# Patient Record
Sex: FEMALE | Race: WHITE | Employment: OTHER | URBAN - METROPOLITAN AREA
[De-identification: names, ages, dates, MRNs, and addresses within clinical notes are randomized per-mention and may not be internally consistent; named-entity substitution may affect disease eponyms.]

---

## 2019-07-14 ENCOUNTER — APPOINTMENT (OUTPATIENT)
Dept: GENERAL RADIOLOGY | Age: 84
DRG: 247 | End: 2019-07-14
Attending: EMERGENCY MEDICINE
Payer: MEDICARE

## 2019-07-14 ENCOUNTER — HOSPITAL ENCOUNTER (INPATIENT)
Age: 84
LOS: 1 days | Discharge: HOME HEALTH | DRG: 247 | End: 2019-07-16
Attending: EMERGENCY MEDICINE | Admitting: INTERNAL MEDICINE
Payer: MEDICARE

## 2019-07-14 DIAGNOSIS — I21.4 NSTEMI (NON-ST ELEVATED MYOCARDIAL INFARCTION) (HCC): ICD-10-CM

## 2019-07-14 DIAGNOSIS — R06.02 SOB (SHORTNESS OF BREATH): ICD-10-CM

## 2019-07-14 DIAGNOSIS — R07.9 CHEST PAIN, UNSPECIFIED TYPE: Primary | ICD-10-CM

## 2019-07-14 LAB
BASOPHILS # BLD: 0 K/UL (ref 0–0.1)
BASOPHILS NFR BLD: 0 % (ref 0–1)
COMMENT, HOLDF: NORMAL
DIFFERENTIAL METHOD BLD: ABNORMAL
EOSINOPHIL # BLD: 0.3 K/UL (ref 0–0.4)
EOSINOPHIL NFR BLD: 3 % (ref 0–7)
ERYTHROCYTE [DISTWIDTH] IN BLOOD BY AUTOMATED COUNT: 15.9 % (ref 11.5–14.5)
HCT VFR BLD AUTO: 33.8 % (ref 35–47)
HGB BLD-MCNC: 10.6 G/DL (ref 11.5–16)
IMM GRANULOCYTES # BLD AUTO: 0.1 K/UL (ref 0–0.04)
IMM GRANULOCYTES NFR BLD AUTO: 1 % (ref 0–0.5)
LYMPHOCYTES # BLD: 1.5 K/UL (ref 0.8–3.5)
LYMPHOCYTES NFR BLD: 16 % (ref 12–49)
MCH RBC QN AUTO: 24.3 PG (ref 26–34)
MCHC RBC AUTO-ENTMCNC: 31.4 G/DL (ref 30–36.5)
MCV RBC AUTO: 77.3 FL (ref 80–99)
MONOCYTES # BLD: 0.9 K/UL (ref 0–1)
MONOCYTES NFR BLD: 9 % (ref 5–13)
NEUTS SEG # BLD: 6.9 K/UL (ref 1.8–8)
NEUTS SEG NFR BLD: 71 % (ref 32–75)
NRBC # BLD: 0 K/UL (ref 0–0.01)
NRBC BLD-RTO: 0 PER 100 WBC
PLATELET # BLD AUTO: 256 K/UL (ref 150–400)
PMV BLD AUTO: 11.5 FL (ref 8.9–12.9)
RBC # BLD AUTO: 4.37 M/UL (ref 3.8–5.2)
SAMPLES BEING HELD,HOLD: NORMAL
TROPONIN I BLD-MCNC: 0.17 NG/ML (ref 0–0.08)
WBC # BLD AUTO: 9.6 K/UL (ref 3.6–11)

## 2019-07-14 PROCEDURE — 36415 COLL VENOUS BLD VENIPUNCTURE: CPT

## 2019-07-14 PROCEDURE — 94762 N-INVAS EAR/PLS OXIMTRY CONT: CPT

## 2019-07-14 PROCEDURE — 85025 COMPLETE CBC W/AUTO DIFF WBC: CPT

## 2019-07-14 PROCEDURE — 84484 ASSAY OF TROPONIN QUANT: CPT

## 2019-07-14 PROCEDURE — 85379 FIBRIN DEGRADATION QUANT: CPT

## 2019-07-14 PROCEDURE — 99285 EMERGENCY DEPT VISIT HI MDM: CPT

## 2019-07-14 PROCEDURE — 83690 ASSAY OF LIPASE: CPT

## 2019-07-14 PROCEDURE — 82550 ASSAY OF CK (CPK): CPT

## 2019-07-14 PROCEDURE — 83880 ASSAY OF NATRIURETIC PEPTIDE: CPT

## 2019-07-14 PROCEDURE — 80053 COMPREHEN METABOLIC PANEL: CPT

## 2019-07-14 PROCEDURE — 74011250637 HC RX REV CODE- 250/637: Performed by: EMERGENCY MEDICINE

## 2019-07-14 PROCEDURE — 85730 THROMBOPLASTIN TIME PARTIAL: CPT

## 2019-07-14 PROCEDURE — 71045 X-RAY EXAM CHEST 1 VIEW: CPT

## 2019-07-14 PROCEDURE — 84443 ASSAY THYROID STIM HORMONE: CPT

## 2019-07-14 PROCEDURE — 93005 ELECTROCARDIOGRAM TRACING: CPT

## 2019-07-14 RX ORDER — CHROMIUM PICOLINATE 200 MCG
1 TABLET ORAL DAILY
COMMUNITY

## 2019-07-14 RX ORDER — ALBUTEROL SULFATE 90 UG/1
2 AEROSOL, METERED RESPIRATORY (INHALATION)
COMMUNITY

## 2019-07-14 RX ORDER — ALENDRONATE SODIUM 70 MG/1
70 TABLET ORAL
COMMUNITY

## 2019-07-14 RX ORDER — SODIUM CHLORIDE 0.9 % (FLUSH) 0.9 %
5-40 SYRINGE (ML) INJECTION EVERY 8 HOURS
Status: DISCONTINUED | OUTPATIENT
Start: 2019-07-14 | End: 2019-07-16 | Stop reason: HOSPADM

## 2019-07-14 RX ORDER — METOPROLOL TARTRATE 50 MG/1
50 TABLET ORAL DAILY
COMMUNITY
End: 2019-07-15

## 2019-07-14 RX ORDER — NITROGLYCERIN 0.4 MG/1
0.4 TABLET SUBLINGUAL
Status: DISPENSED | OUTPATIENT
Start: 2019-07-14 | End: 2019-07-14

## 2019-07-14 RX ORDER — SODIUM CHLORIDE 0.9 % (FLUSH) 0.9 %
5-40 SYRINGE (ML) INJECTION AS NEEDED
Status: DISCONTINUED | OUTPATIENT
Start: 2019-07-14 | End: 2019-07-16 | Stop reason: HOSPADM

## 2019-07-14 RX ORDER — GUAIFENESIN 100 MG/5ML
162 LIQUID (ML) ORAL
Status: COMPLETED | OUTPATIENT
Start: 2019-07-14 | End: 2019-07-14

## 2019-07-14 RX ADMIN — ASPIRIN 81 MG 162 MG: 81 TABLET ORAL at 23:27

## 2019-07-14 RX ADMIN — NITROGLYCERIN 0.4 MG: 0.4 TABLET, ORALLY DISINTEGRATING SUBLINGUAL at 23:27

## 2019-07-14 RX ADMIN — NITROGLYCERIN 0.5 INCH: 20 OINTMENT TOPICAL at 23:42

## 2019-07-14 NOTE — Clinical Note
Difficulty engaging left coronaries. Attempted using several catheters.   Aborting radial approach and switching to femoral.

## 2019-07-14 NOTE — Clinical Note
Lesion located in the Proximal RCA. Balloon inflated using single inflation technique. Pressure = 14 merary; Duration = 30 sec.

## 2019-07-14 NOTE — Clinical Note
TRANSFER - OUT REPORT:  
 
Verbal report given to: Paresh Schimtz   . Report consisted of patient's Situation, Background, Assessment and  
Recommendations(SBAR). Opportunity for questions and clarification was provided. Patient transported with a Registered Nurse. Patient [facility] to: [facility]. [facility] Chi Lee.  Bedside report to be given

## 2019-07-14 NOTE — Clinical Note
Lesion: Located in the Proximal RCA. Stent deployed. First inflation pressure = 18 merary; inflation time: 30 sec.

## 2019-07-14 NOTE — Clinical Note
Sheath #2: Sheath: inserted. Sheath inserted/placed in the right femoral  artery. Hemostasis achieved.  [employer] 6FR sheath

## 2019-07-14 NOTE — Clinical Note
Balloon inflated using multiple inflations inflation technique. Pressure = 14 merary; Duration = 23 sec. Inflation 2: Pressure: 10 merary; Duration: 22 sec.

## 2019-07-15 ENCOUNTER — APPOINTMENT (OUTPATIENT)
Dept: NON INVASIVE DIAGNOSTICS | Age: 84
DRG: 247 | End: 2019-07-15
Attending: INTERNAL MEDICINE
Payer: MEDICARE

## 2019-07-15 ENCOUNTER — APPOINTMENT (OUTPATIENT)
Dept: CT IMAGING | Age: 84
DRG: 247 | End: 2019-07-15
Attending: INTERNAL MEDICINE
Payer: MEDICARE

## 2019-07-15 PROBLEM — M32.9 LUPUS (HCC): Chronic | Status: ACTIVE | Noted: 2019-07-15

## 2019-07-15 PROBLEM — J45.909 ASTHMA: Chronic | Status: ACTIVE | Noted: 2019-07-15

## 2019-07-15 PROBLEM — R07.9 CHEST PAIN AT REST: Status: ACTIVE | Noted: 2019-07-15

## 2019-07-15 PROBLEM — I21.4 NSTEMI (NON-ST ELEVATED MYOCARDIAL INFARCTION) (HCC): Status: ACTIVE | Noted: 2019-07-15

## 2019-07-15 PROBLEM — E03.9 HYPOTHYROID: Chronic | Status: ACTIVE | Noted: 2019-07-15

## 2019-07-15 PROBLEM — R07.9 ACUTE CHEST PAIN: Status: ACTIVE | Noted: 2019-07-15

## 2019-07-15 LAB
ACT BLD: 169 SECS (ref 79–138)
ACT BLD: 202 SECS (ref 79–138)
ACT BLD: 307 SECS (ref 79–138)
ALBUMIN SERPL-MCNC: 2.9 G/DL (ref 3.5–5)
ALBUMIN/GLOB SERPL: 0.7 {RATIO} (ref 1.1–2.2)
ALP SERPL-CCNC: 83 U/L (ref 45–117)
ALT SERPL-CCNC: 21 U/L (ref 12–78)
ANION GAP SERPL CALC-SCNC: 6 MMOL/L (ref 5–15)
APTT PPP: 25.7 SEC (ref 22.1–32)
APTT PPP: 93.1 SEC (ref 22.1–32)
AST SERPL-CCNC: 19 U/L (ref 15–37)
ATRIAL RATE: 76 BPM
ATRIAL RATE: 86 BPM
BASOPHILS # BLD: 0 K/UL (ref 0–0.1)
BASOPHILS NFR BLD: 0 % (ref 0–1)
BILIRUB SERPL-MCNC: 0.4 MG/DL (ref 0.2–1)
BNP SERPL-MCNC: 2723 PG/ML
BUN SERPL-MCNC: 14 MG/DL (ref 6–20)
BUN/CREAT SERPL: 15 (ref 12–20)
CALCIUM SERPL-MCNC: 8.5 MG/DL (ref 8.5–10.1)
CALCULATED P AXIS, ECG09: 60 DEGREES
CALCULATED P AXIS, ECG09: 87 DEGREES
CALCULATED R AXIS, ECG10: -22 DEGREES
CALCULATED R AXIS, ECG10: -29 DEGREES
CALCULATED T AXIS, ECG11: 106 DEGREES
CALCULATED T AXIS, ECG11: 125 DEGREES
CHLORIDE SERPL-SCNC: 107 MMOL/L (ref 97–108)
CK MB CFR SERPL CALC: 4.7 % (ref 0–2.5)
CK MB SERPL-MCNC: 2.4 NG/ML (ref 5–25)
CK SERPL-CCNC: 51 U/L (ref 26–192)
CK SERPL-CCNC: 75 U/L (ref 26–192)
CO2 SERPL-SCNC: 26 MMOL/L (ref 21–32)
COMMENT, HOLDF: NORMAL
CREAT SERPL-MCNC: 0.91 MG/DL (ref 0.55–1.02)
D DIMER PPP FEU-MCNC: 1.02 MG/L FEU (ref 0–0.65)
DIAGNOSIS, 93000: NORMAL
DIAGNOSIS, 93000: NORMAL
DIFFERENTIAL METHOD BLD: ABNORMAL
EOSINOPHIL # BLD: 0.3 K/UL (ref 0–0.4)
EOSINOPHIL NFR BLD: 4 % (ref 0–7)
ERYTHROCYTE [DISTWIDTH] IN BLOOD BY AUTOMATED COUNT: 16.1 % (ref 11.5–14.5)
GLOBULIN SER CALC-MCNC: 3.9 G/DL (ref 2–4)
GLUCOSE SERPL-MCNC: 108 MG/DL (ref 65–100)
HCT VFR BLD AUTO: 32 % (ref 35–47)
HGB BLD-MCNC: 10.1 G/DL (ref 11.5–16)
IMM GRANULOCYTES # BLD AUTO: 0 K/UL (ref 0–0.04)
IMM GRANULOCYTES NFR BLD AUTO: 1 % (ref 0–0.5)
LIPASE SERPL-CCNC: 72 U/L (ref 73–393)
LYMPHOCYTES # BLD: 1.8 K/UL (ref 0.8–3.5)
LYMPHOCYTES NFR BLD: 24 % (ref 12–49)
MCH RBC QN AUTO: 24.3 PG (ref 26–34)
MCHC RBC AUTO-ENTMCNC: 31.6 G/DL (ref 30–36.5)
MCV RBC AUTO: 77.1 FL (ref 80–99)
MONOCYTES # BLD: 0.8 K/UL (ref 0–1)
MONOCYTES NFR BLD: 10 % (ref 5–13)
NEUTS SEG # BLD: 4.8 K/UL (ref 1.8–8)
NEUTS SEG NFR BLD: 61 % (ref 32–75)
NRBC # BLD: 0 K/UL (ref 0–0.01)
NRBC BLD-RTO: 0 PER 100 WBC
P-R INTERVAL, ECG05: 140 MS
P-R INTERVAL, ECG05: 144 MS
PLATELET # BLD AUTO: 249 K/UL (ref 150–400)
PMV BLD AUTO: 11.1 FL (ref 8.9–12.9)
POTASSIUM SERPL-SCNC: 3.6 MMOL/L (ref 3.5–5.1)
PROT SERPL-MCNC: 6.8 G/DL (ref 6.4–8.2)
Q-T INTERVAL, ECG07: 368 MS
Q-T INTERVAL, ECG07: 410 MS
QRS DURATION, ECG06: 106 MS
QRS DURATION, ECG06: 106 MS
QTC CALCULATION (BEZET), ECG08: 440 MS
QTC CALCULATION (BEZET), ECG08: 461 MS
RBC # BLD AUTO: 4.15 M/UL (ref 3.8–5.2)
SAMPLES BEING HELD,HOLD: NORMAL
SODIUM SERPL-SCNC: 139 MMOL/L (ref 136–145)
THERAPEUTIC RANGE,PTTT: ABNORMAL SECS (ref 58–77)
THERAPEUTIC RANGE,PTTT: NORMAL SECS (ref 58–77)
TROPONIN I SERPL-MCNC: 0.23 NG/ML
TROPONIN I SERPL-MCNC: 0.33 NG/ML
TSH SERPL DL<=0.05 MIU/L-ACNC: 0.39 UIU/ML (ref 0.36–3.74)
VENTRICULAR RATE, ECG03: 76 BPM
VENTRICULAR RATE, ECG03: 86 BPM
WBC # BLD AUTO: 7.8 K/UL (ref 3.6–11)

## 2019-07-15 PROCEDURE — 77030008543 HC TBNG MON PRSS MRTM -A: Performed by: INTERNAL MEDICINE

## 2019-07-15 PROCEDURE — 94640 AIRWAY INHALATION TREATMENT: CPT

## 2019-07-15 PROCEDURE — 84484 ASSAY OF TROPONIN QUANT: CPT

## 2019-07-15 PROCEDURE — 77030029065 HC DRSG HEMO QCLOT ZMED -B

## 2019-07-15 PROCEDURE — 85025 COMPLETE CBC W/AUTO DIFF WBC: CPT

## 2019-07-15 PROCEDURE — 74011250636 HC RX REV CODE- 250/636: Performed by: INTERNAL MEDICINE

## 2019-07-15 PROCEDURE — 77030013715 HC INFL SYS MRTM -B: Performed by: INTERNAL MEDICINE

## 2019-07-15 PROCEDURE — 93306 TTE W/DOPPLER COMPLETE: CPT

## 2019-07-15 PROCEDURE — 93005 ELECTROCARDIOGRAM TRACING: CPT

## 2019-07-15 PROCEDURE — 74011250637 HC RX REV CODE- 250/637: Performed by: INTERNAL MEDICINE

## 2019-07-15 PROCEDURE — 74011000250 HC RX REV CODE- 250: Performed by: INTERNAL MEDICINE

## 2019-07-15 PROCEDURE — 36415 COLL VENOUS BLD VENIPUNCTURE: CPT

## 2019-07-15 PROCEDURE — 85730 THROMBOPLASTIN TIME PARTIAL: CPT

## 2019-07-15 PROCEDURE — C1760 CLOSURE DEV, VASC: HCPCS | Performed by: INTERNAL MEDICINE

## 2019-07-15 PROCEDURE — 77030003560 HC NDL HUBR BARD -A

## 2019-07-15 PROCEDURE — 99153 MOD SED SAME PHYS/QHP EA: CPT | Performed by: INTERNAL MEDICINE

## 2019-07-15 PROCEDURE — C1769 GUIDE WIRE: HCPCS | Performed by: INTERNAL MEDICINE

## 2019-07-15 PROCEDURE — 75710 ARTERY X-RAYS ARM/LEG: CPT | Performed by: INTERNAL MEDICINE

## 2019-07-15 PROCEDURE — 027034Z DILATION OF CORONARY ARTERY, ONE ARTERY WITH DRUG-ELUTING INTRALUMINAL DEVICE, PERCUTANEOUS APPROACH: ICD-10-PCS | Performed by: INTERNAL MEDICINE

## 2019-07-15 PROCEDURE — 74011636320 HC RX REV CODE- 636/320: Performed by: RADIOLOGY

## 2019-07-15 PROCEDURE — 85347 COAGULATION TIME ACTIVATED: CPT

## 2019-07-15 PROCEDURE — 71275 CT ANGIOGRAPHY CHEST: CPT

## 2019-07-15 PROCEDURE — C1725 CATH, TRANSLUMIN NON-LASER: HCPCS | Performed by: INTERNAL MEDICINE

## 2019-07-15 PROCEDURE — C1887 CATHETER, GUIDING: HCPCS | Performed by: INTERNAL MEDICINE

## 2019-07-15 PROCEDURE — C9113 INJ PANTOPRAZOLE SODIUM, VIA: HCPCS | Performed by: INTERNAL MEDICINE

## 2019-07-15 PROCEDURE — 92928 PRQ TCAT PLMT NTRAC ST 1 LES: CPT | Performed by: INTERNAL MEDICINE

## 2019-07-15 PROCEDURE — 82550 ASSAY OF CK (CPK): CPT

## 2019-07-15 PROCEDURE — C1874 STENT, COATED/COV W/DEL SYS: HCPCS | Performed by: INTERNAL MEDICINE

## 2019-07-15 PROCEDURE — 65660000000 HC RM CCU STEPDOWN

## 2019-07-15 PROCEDURE — 74011250636 HC RX REV CODE- 250/636

## 2019-07-15 PROCEDURE — B2111ZZ FLUOROSCOPY OF MULTIPLE CORONARY ARTERIES USING LOW OSMOLAR CONTRAST: ICD-10-PCS | Performed by: INTERNAL MEDICINE

## 2019-07-15 PROCEDURE — 77030004532 HC CATH ANGI DX IMP BSC -A: Performed by: INTERNAL MEDICINE

## 2019-07-15 PROCEDURE — 4A023N7 MEASUREMENT OF CARDIAC SAMPLING AND PRESSURE, LEFT HEART, PERCUTANEOUS APPROACH: ICD-10-PCS | Performed by: INTERNAL MEDICINE

## 2019-07-15 PROCEDURE — C1894 INTRO/SHEATH, NON-LASER: HCPCS | Performed by: INTERNAL MEDICINE

## 2019-07-15 PROCEDURE — 93458 L HRT ARTERY/VENTRICLE ANGIO: CPT | Performed by: INTERNAL MEDICINE

## 2019-07-15 PROCEDURE — B2151ZZ FLUOROSCOPY OF LEFT HEART USING LOW OSMOLAR CONTRAST: ICD-10-PCS | Performed by: INTERNAL MEDICINE

## 2019-07-15 PROCEDURE — 77030019569 HC BND COMPR RAD TERU -B: Performed by: INTERNAL MEDICINE

## 2019-07-15 PROCEDURE — 99152 MOD SED SAME PHYS/QHP 5/>YRS: CPT | Performed by: INTERNAL MEDICINE

## 2019-07-15 PROCEDURE — 71270 CT THORAX DX C-/C+: CPT

## 2019-07-15 DEVICE — IMPLANTABLE DEVICE: Type: IMPLANTABLE DEVICE | Status: FUNCTIONAL

## 2019-07-15 RX ORDER — IPRATROPIUM BROMIDE AND ALBUTEROL SULFATE 2.5; .5 MG/3ML; MG/3ML
3 SOLUTION RESPIRATORY (INHALATION)
Status: DISCONTINUED | OUTPATIENT
Start: 2019-07-15 | End: 2019-07-16 | Stop reason: HOSPADM

## 2019-07-15 RX ORDER — DIPHENHYDRAMINE HYDROCHLORIDE 50 MG/ML
12.5 INJECTION, SOLUTION INTRAMUSCULAR; INTRAVENOUS
Status: DISCONTINUED | OUTPATIENT
Start: 2019-07-15 | End: 2019-07-16 | Stop reason: HOSPADM

## 2019-07-15 RX ORDER — HEPARIN SODIUM 5000 [USP'U]/ML
80 INJECTION, SOLUTION INTRAVENOUS; SUBCUTANEOUS ONCE
Status: COMPLETED | OUTPATIENT
Start: 2019-07-15 | End: 2019-07-15

## 2019-07-15 RX ORDER — MELOXICAM 7.5 MG/1
TABLET ORAL DAILY
COMMUNITY
End: 2019-07-15

## 2019-07-15 RX ORDER — BUDESONIDE AND FORMOTEROL FUMARATE DIHYDRATE 160; 4.5 UG/1; UG/1
2 AEROSOL RESPIRATORY (INHALATION) 2 TIMES DAILY
COMMUNITY
End: 2021-04-23 | Stop reason: ALTCHOICE

## 2019-07-15 RX ORDER — LEVOTHYROXINE SODIUM 125 UG/1
125 TABLET ORAL
Status: DISCONTINUED | OUTPATIENT
Start: 2019-07-16 | End: 2019-07-16 | Stop reason: HOSPADM

## 2019-07-15 RX ORDER — ACETAMINOPHEN 325 MG/1
650 TABLET ORAL
Status: DISCONTINUED | OUTPATIENT
Start: 2019-07-15 | End: 2019-07-16 | Stop reason: HOSPADM

## 2019-07-15 RX ORDER — LEVOTHYROXINE SODIUM 150 UG/1
150 TABLET ORAL
COMMUNITY
End: 2019-07-15

## 2019-07-15 RX ORDER — MONTELUKAST SODIUM 10 MG/1
10 TABLET ORAL DAILY
Status: DISCONTINUED | OUTPATIENT
Start: 2019-07-15 | End: 2019-07-16 | Stop reason: HOSPADM

## 2019-07-15 RX ORDER — SODIUM CHLORIDE 0.9 % (FLUSH) 0.9 %
5-40 SYRINGE (ML) INJECTION AS NEEDED
Status: DISCONTINUED | OUTPATIENT
Start: 2019-07-15 | End: 2019-07-16 | Stop reason: HOSPADM

## 2019-07-15 RX ORDER — LIDOCAINE HYDROCHLORIDE 10 MG/ML
INJECTION INFILTRATION; PERINEURAL AS NEEDED
Status: DISCONTINUED | OUTPATIENT
Start: 2019-07-15 | End: 2019-07-15 | Stop reason: HOSPADM

## 2019-07-15 RX ORDER — CLOPIDOGREL BISULFATE 75 MG/1
75 TABLET ORAL DAILY
Status: DISCONTINUED | OUTPATIENT
Start: 2019-07-16 | End: 2019-07-16 | Stop reason: HOSPADM

## 2019-07-15 RX ORDER — HEPARIN SODIUM 1000 [USP'U]/ML
INJECTION, SOLUTION INTRAVENOUS; SUBCUTANEOUS AS NEEDED
Status: DISCONTINUED | OUTPATIENT
Start: 2019-07-15 | End: 2019-07-15 | Stop reason: HOSPADM

## 2019-07-15 RX ORDER — LEVOTHYROXINE SODIUM 75 UG/1
150 TABLET ORAL
Status: DISCONTINUED | OUTPATIENT
Start: 2019-07-15 | End: 2019-07-15

## 2019-07-15 RX ORDER — HYDROXYCHLOROQUINE SULFATE 200 MG/1
200 TABLET, FILM COATED ORAL DAILY
Status: ON HOLD | COMMUNITY
End: 2021-09-20

## 2019-07-15 RX ORDER — LISINOPRIL 40 MG/1
40 TABLET ORAL DAILY
COMMUNITY
End: 2020-10-13

## 2019-07-15 RX ORDER — BUDESONIDE 0.5 MG/2ML
500 INHALANT ORAL
Status: DISCONTINUED | OUTPATIENT
Start: 2019-07-15 | End: 2019-07-16 | Stop reason: HOSPADM

## 2019-07-15 RX ORDER — GUAIFENESIN 100 MG/5ML
LIQUID (ML) ORAL AS NEEDED
Status: DISCONTINUED | OUTPATIENT
Start: 2019-07-15 | End: 2019-07-15 | Stop reason: HOSPADM

## 2019-07-15 RX ORDER — MELOXICAM 15 MG/1
15 TABLET ORAL DAILY
Status: ON HOLD | COMMUNITY
End: 2019-07-15

## 2019-07-15 RX ORDER — MONTELUKAST SODIUM 10 MG/1
10 TABLET ORAL DAILY
COMMUNITY

## 2019-07-15 RX ORDER — HYDROCODONE BITARTRATE AND ACETAMINOPHEN 5; 325 MG/1; MG/1
1 TABLET ORAL
Status: DISCONTINUED | OUTPATIENT
Start: 2019-07-15 | End: 2019-07-16 | Stop reason: HOSPADM

## 2019-07-15 RX ORDER — GUAIFENESIN 100 MG/5ML
81 LIQUID (ML) ORAL DAILY
Status: DISCONTINUED | OUTPATIENT
Start: 2019-07-15 | End: 2019-07-16 | Stop reason: HOSPADM

## 2019-07-15 RX ORDER — OMEPRAZOLE 20 MG/1
20 CAPSULE, DELAYED RELEASE ORAL DAILY
COMMUNITY

## 2019-07-15 RX ORDER — HEPARIN SODIUM 10000 [USP'U]/100ML
18-36 INJECTION, SOLUTION INTRAVENOUS
Status: DISCONTINUED | OUTPATIENT
Start: 2019-07-15 | End: 2019-07-15

## 2019-07-15 RX ORDER — PANTOPRAZOLE SODIUM 40 MG/1
40 TABLET, DELAYED RELEASE ORAL
Status: DISCONTINUED | OUTPATIENT
Start: 2019-07-15 | End: 2019-07-15

## 2019-07-15 RX ORDER — FOLIC ACID 1 MG/1
1 TABLET ORAL DAILY
COMMUNITY

## 2019-07-15 RX ORDER — MORPHINE SULFATE 4 MG/ML
1 INJECTION INTRAVENOUS
Status: DISCONTINUED | OUTPATIENT
Start: 2019-07-15 | End: 2019-07-16 | Stop reason: HOSPADM

## 2019-07-15 RX ORDER — VERAPAMIL HYDROCHLORIDE 2.5 MG/ML
INJECTION, SOLUTION INTRAVENOUS AS NEEDED
Status: DISCONTINUED | OUTPATIENT
Start: 2019-07-15 | End: 2019-07-15 | Stop reason: HOSPADM

## 2019-07-15 RX ORDER — METOPROLOL TARTRATE 50 MG/1
50 TABLET ORAL DAILY
Status: DISCONTINUED | OUTPATIENT
Start: 2019-07-15 | End: 2019-07-15

## 2019-07-15 RX ORDER — SODIUM CHLORIDE 0.9 % (FLUSH) 0.9 %
5-40 SYRINGE (ML) INJECTION EVERY 8 HOURS
Status: DISCONTINUED | OUTPATIENT
Start: 2019-07-15 | End: 2019-07-16 | Stop reason: HOSPADM

## 2019-07-15 RX ORDER — LEVOTHYROXINE SODIUM 125 UG/1
125 TABLET ORAL
COMMUNITY

## 2019-07-15 RX ORDER — DEXTROSE, SODIUM CHLORIDE, AND POTASSIUM CHLORIDE 5; .45; .15 G/100ML; G/100ML; G/100ML
50 INJECTION INTRAVENOUS CONTINUOUS
Status: DISCONTINUED | OUTPATIENT
Start: 2019-07-15 | End: 2019-07-16

## 2019-07-15 RX ORDER — GUAIFENESIN 100 MG/5ML
81 LIQUID (ML) ORAL
COMMUNITY

## 2019-07-15 RX ORDER — MIDAZOLAM HYDROCHLORIDE 1 MG/ML
INJECTION, SOLUTION INTRAMUSCULAR; INTRAVENOUS AS NEEDED
Status: DISCONTINUED | OUTPATIENT
Start: 2019-07-15 | End: 2019-07-15 | Stop reason: HOSPADM

## 2019-07-15 RX ORDER — ARFORMOTEROL TARTRATE 15 UG/2ML
15 SOLUTION RESPIRATORY (INHALATION)
Status: DISCONTINUED | OUTPATIENT
Start: 2019-07-15 | End: 2019-07-16 | Stop reason: HOSPADM

## 2019-07-15 RX ORDER — LISINOPRIL 20 MG/1
40 TABLET ORAL DAILY
Status: DISCONTINUED | OUTPATIENT
Start: 2019-07-15 | End: 2019-07-16 | Stop reason: HOSPADM

## 2019-07-15 RX ORDER — HYDROXYCHLOROQUINE SULFATE 200 MG/1
200 TABLET, FILM COATED ORAL DAILY
Status: DISCONTINUED | OUTPATIENT
Start: 2019-07-15 | End: 2019-07-16 | Stop reason: HOSPADM

## 2019-07-15 RX ORDER — DOCUSATE SODIUM 100 MG/1
100 CAPSULE, LIQUID FILLED ORAL 2 TIMES DAILY
Status: DISCONTINUED | OUTPATIENT
Start: 2019-07-15 | End: 2019-07-16 | Stop reason: HOSPADM

## 2019-07-15 RX ORDER — BISMUTH SUBSALICYLATE 262 MG
1 TABLET,CHEWABLE ORAL DAILY
COMMUNITY
End: 2021-04-23 | Stop reason: ALTCHOICE

## 2019-07-15 RX ORDER — SODIUM CHLORIDE 9 MG/ML
75 INJECTION, SOLUTION INTRAVENOUS CONTINUOUS
Status: DISCONTINUED | OUTPATIENT
Start: 2019-07-15 | End: 2019-07-15

## 2019-07-15 RX ORDER — ONDANSETRON 2 MG/ML
4 INJECTION INTRAMUSCULAR; INTRAVENOUS
Status: DISCONTINUED | OUTPATIENT
Start: 2019-07-15 | End: 2019-07-16 | Stop reason: HOSPADM

## 2019-07-15 RX ORDER — FENTANYL CITRATE 50 UG/ML
INJECTION, SOLUTION INTRAMUSCULAR; INTRAVENOUS AS NEEDED
Status: DISCONTINUED | OUTPATIENT
Start: 2019-07-15 | End: 2019-07-15 | Stop reason: HOSPADM

## 2019-07-15 RX ORDER — NALOXONE HYDROCHLORIDE 0.4 MG/ML
0.4 INJECTION, SOLUTION INTRAMUSCULAR; INTRAVENOUS; SUBCUTANEOUS AS NEEDED
Status: DISCONTINUED | OUTPATIENT
Start: 2019-07-15 | End: 2019-07-16 | Stop reason: HOSPADM

## 2019-07-15 RX ORDER — CLOPIDOGREL 300 MG/1
TABLET, FILM COATED ORAL AS NEEDED
Status: DISCONTINUED | OUTPATIENT
Start: 2019-07-15 | End: 2019-07-15 | Stop reason: HOSPADM

## 2019-07-15 RX ORDER — METOPROLOL SUCCINATE 50 MG/1
50 TABLET, EXTENDED RELEASE ORAL DAILY
Status: DISCONTINUED | OUTPATIENT
Start: 2019-07-16 | End: 2019-07-16 | Stop reason: HOSPADM

## 2019-07-15 RX ORDER — ATORVASTATIN CALCIUM 20 MG/1
20 TABLET, FILM COATED ORAL DAILY
COMMUNITY

## 2019-07-15 RX ORDER — METOPROLOL SUCCINATE 50 MG/1
75 TABLET, EXTENDED RELEASE ORAL DAILY
COMMUNITY
End: 2020-10-13

## 2019-07-15 RX ADMIN — ASPIRIN 81 MG 81 MG: 81 TABLET ORAL at 08:23

## 2019-07-15 RX ADMIN — SODIUM CHLORIDE 75 ML/HR: 9 INJECTION, SOLUTION INTRAVENOUS at 17:00

## 2019-07-15 RX ADMIN — IOPAMIDOL 82 ML: 755 INJECTION, SOLUTION INTRAVENOUS at 01:43

## 2019-07-15 RX ADMIN — HEPARIN SODIUM 5300 UNITS: 5000 INJECTION INTRAVENOUS; SUBCUTANEOUS at 02:55

## 2019-07-15 RX ADMIN — LISINOPRIL 40 MG: 20 TABLET ORAL at 08:23

## 2019-07-15 RX ADMIN — METOPROLOL TARTRATE 50 MG: 50 TABLET ORAL at 08:23

## 2019-07-15 RX ADMIN — Medication 10 ML: at 21:04

## 2019-07-15 RX ADMIN — Medication 10 ML: at 08:10

## 2019-07-15 RX ADMIN — MORPHINE SULFATE 1 MG: 4 INJECTION, SOLUTION INTRAMUSCULAR; INTRAVENOUS at 01:57

## 2019-07-15 RX ADMIN — ARFORMOTEROL TARTRATE 15 MCG: 15 SOLUTION RESPIRATORY (INHALATION) at 09:50

## 2019-07-15 RX ADMIN — ACETAMINOPHEN 650 MG: 325 TABLET ORAL at 21:04

## 2019-07-15 RX ADMIN — MONTELUKAST SODIUM 10 MG: 10 TABLET, FILM COATED ORAL at 08:23

## 2019-07-15 RX ADMIN — Medication 10 ML: at 22:00

## 2019-07-15 RX ADMIN — DEXTROSE MONOHYDRATE, SODIUM CHLORIDE, AND POTASSIUM CHLORIDE 50 ML/HR: 50; 4.5; 1.49 INJECTION, SOLUTION INTRAVENOUS at 03:20

## 2019-07-15 RX ADMIN — DOCUSATE SODIUM 100 MG: 100 CAPSULE, LIQUID FILLED ORAL at 08:23

## 2019-07-15 RX ADMIN — PANTOPRAZOLE SODIUM 40 MG: 40 INJECTION, POWDER, FOR SOLUTION INTRAVENOUS at 21:04

## 2019-07-15 RX ADMIN — DOCUSATE SODIUM 100 MG: 100 CAPSULE, LIQUID FILLED ORAL at 17:23

## 2019-07-15 RX ADMIN — BUDESONIDE 500 MCG: 0.5 INHALANT RESPIRATORY (INHALATION) at 19:36

## 2019-07-15 RX ADMIN — BUDESONIDE 500 MCG: 0.5 INHALANT RESPIRATORY (INHALATION) at 09:50

## 2019-07-15 RX ADMIN — HEPARIN SODIUM AND DEXTROSE 18 UNITS/KG/HR: 10000; 5 INJECTION INTRAVENOUS at 02:52

## 2019-07-15 RX ADMIN — Medication 10 ML: at 01:54

## 2019-07-15 RX ADMIN — LEVOTHYROXINE SODIUM 150 MCG: 75 TABLET ORAL at 08:23

## 2019-07-15 RX ADMIN — HYDROXYCHLOROQUINE SULFATE 200 MG: 200 TABLET, FILM COATED ORAL at 08:23

## 2019-07-15 RX ADMIN — ARFORMOTEROL TARTRATE 15 MCG: 15 SOLUTION RESPIRATORY (INHALATION) at 19:36

## 2019-07-15 RX ADMIN — PANTOPRAZOLE SODIUM 40 MG: 40 INJECTION, POWDER, FOR SOLUTION INTRAVENOUS at 08:24

## 2019-07-15 NOTE — PROGRESS NOTES
13:50,report received,assumed care of pt. EKG completed. 15:30, Air release completed. TR Band removed from rt wrist. No bleeding or  Hematoma. Dressing applied. Wrist immobilizer in place. Radial and ulnar pulse remain palpable on affected extremity. Pt tolerated well. Instructions given to pt regarding movement and activity restrictions. Pt voiced understanding. 14:55. TRANSFER - OUT REPORT:    Verbal report given to SHELDON Merino(name) on [place]  being transferred to [facility](unit) for routine progression of care       Report consisted of patients Situation, Background, Assessment and   Recommendations(SBAR). Information from the following report(s) Procedure Summary was reviewed with the receiving nurse. Lines:   Venous Access Device 07/15/19 Upper chest (subclavicular area, right (Active)   Site Assessment Clean, dry, & intact 7/15/2019  1:35 PM   Dressing Status Clean, dry, & intact 7/15/2019  1:35 PM       Peripheral IV 07/14/19 Left Antecubital (Active)   Site Assessment Clean, dry, & intact 7/15/2019  1:35 PM   Phlebitis Assessment 0 7/15/2019  1:35 PM   Infiltration Assessment 0 7/15/2019  1:35 PM   Dressing Status Clean, dry, & intact 7/15/2019  1:35 PM   Dressing Type Transparent 7/15/2019  1:35 PM   Hub Color/Line Status Pink 7/15/2019  1:35 PM        Opportunity for questions and clarification was provided.       Patient transported with:   Monitor  Registered Nurse

## 2019-07-15 NOTE — PROGRESS NOTES
GI note    Consult received chart reviewed. If it is felt that her paraesophageal hernia is the cause of her chest pain then a surgical consultation would be in order. Full consult to follow. Clark King MD       NSTEMI is the diagnosis, now status post cath with stent placement. Will provide consult tomorrow to see if chest pain resolves with coronary intervention.   Clark King MD

## 2019-07-15 NOTE — PROCEDURES
BRIEF PROCEDURE NOTE    Date of Procedure: 7/15/2019   Preoperative Diagnosis: NSTEMI  Postoperative Diagnosis: PCI to RCA with VINOD  Procedure: Left heart cath, LV angiography, coronary angiography  Interventional Cardiologist: Alphonso Ace MD  Anesthesia: local + IV sedation  Estimated Blood Loss: Minimal  Findings:     R Radial access  RCA - JR4  LCA - difficulty to access with TIG4;EBU 3.5    R CFA - micropuncture/ultrasound/fluorscopy guided access    L Main: Ca++; Ostial LM 10% ( good reflux/no dampening)    LAD: Med; MLI; D1 - MLI    LCflex: Med; MLI    RCA: Dominant  Prox 95%; Mid 50%; PDA and PLB - small; PLB branch diffuse severe dz      LVEDP:  15    LVEF: Not assessed; Nml by echo    No significant gradient across aortic valve. PCI: JR4 guide ( with SH)  BMW wire  Pre dil with 2 by 12  VINOD 2.5 by 26  Post dil with 2.75 by 12 ( at high merary - approx 3mm)  GERALD 3 before and after        Specimens Removed : None    Closure Device: TR Band - R radial    R CFA - mynx        See full cath note.     Complications: none    Alphonso Ace MD

## 2019-07-15 NOTE — H&P
[facility]  [address] [facility] [place], [address] [place] [address]  [phone]    Admission History and Physical      NAME:  Kenya Cali   :   1935   MRN:  [id number]     PCP:  Bernardo Frost MD     Date/Time:  7/15/2019         Subjective:     CHIEF COMPLAINT: chest pain, SOB     HISTORY OF PRESENT ILLNESS:     The patient is a 82 yo hx of HTN, asthma, SLE, hypothyroid, non-Hodgkin's lymphoma in remission, presented w/ chest pain, SOB, possible NSTEMI. The patient c/o intermittent midsternal chest pain for 1 month, associated with severe dyspnea on exertion. She was admitted at [facility] last month and received a negative stress test.  However, her chest pain and dyspnea remained the same. She denied cough, fevers, chills, nausea, vomiting, diarrhea, diaphoresis. In the ED, EKG showed a LBBB (no old EKG to compare). Trop was 0.23, pro BNP 2,723. Allergies   Allergen Reactions    Latex Rash       Prior to Admission medications    Medication Sig Start Date End Date Taking? Authorizing Provider   hydroxychloroquine (PLAQUENIL) 200 mg tablet Take 200 mg by mouth daily. Yes Provider, Historical   levothyroxine (SYNTHROID) 150 mcg tablet Take 150 mcg by mouth Daily (before breakfast). Yes Provider, Historical   lisinopril (PRINIVIL, ZESTRIL) 40 mg tablet Take 40 mg by mouth daily. Yes Provider, Historical   montelukast (SINGULAIR) 10 mg tablet Take 10 mg by mouth daily. Yes Provider, Historical   omeprazole (PRILOSEC) 20 mg capsule Take 20 mg by mouth daily. Yes Provider, Historical   budesonide-formoterol (SYMBICORT) 160-4.5 mcg/actuation HFAA Take 2 Puffs by inhalation two (2) times a day. Yes Provider, Historical   albuterol (PROVENTIL HFA, VENTOLIN HFA, PROAIR HFA) 90 mcg/actuation inhaler Take 2 Puffs by inhalation every six (6) hours as needed for Wheezing.    Yes Other, MD Poppy   alendronate (FOSAMAX) 70 mg tablet Take 70 mg by mouth every seven (7) days. Yes Other, MD Poppy   metoprolol tartrate (LOPRESSOR) 50 mg tablet Take 50 mg by mouth daily. Yes Other, MD Poppy   Calcium-Cholecalciferol, D3, (CALCIUM 600 WITH VITAMIN D3) 600 mg(1,500mg) -400 unit cap Take  by mouth. Yes Other, MD Poppy       Past Medical History:   Diagnosis Date    Asthma     Hypertension     Hypothyroid     [facility]     [facility]         No past surgical history on file. Social History     Tobacco Use    Smoking status: Not on file   Substance Use Topics    Alcohol use: Not on file        Family History   Problem Relation Age of Onset    Heart Disease Mother         Review of Systems:  (bold if positive, if negative)    Gen:  Eyes:  ENT:  CVS:   chest pain,Pulm:   dyspnea, GI:    :    MS:  Skin:  Psych:  Endo:    Hem:  Renal:    Neuro:          Objective:      VITALS:    Vital signs reviewed; most recent are:    Visit Vitals  BP (!) 142/101   Pulse 81   Temp 98.4 °F (36.9 °C)   Resp 25   Ht 5' 2\" (1.575 m)   Wt 66.2 kg (146 lb)   SpO2 99%   BMI 26.70 kg/m²     SpO2 Readings from Last 6 Encounters:   07/14/19 99%        No intake or output data in the 24 hours ending 07/15/19 0127     Exam:     Physical Exam:    Gen:  Elderly, frail, mild distress  HEENT:  Pink conjunctivae, PERRL, hearing intact to voice, moist mucous membranes  Neck:  Supple, without masses, thyroid non-tender  Resp:  No accessory muscle use, bilateral rales at bases  Card:  2/6 murmurs, normal S1, S2 without thrills, bruits or peripheral edema  Abd:  Soft, non-tender, non-distended, normoactive bowel sounds are present  Lymph:  No cervical adenopathy  Musc:  No cyanosis or clubbing  Skin:  No rashes  Neuro:  Cranial nerves 3-12 are grossly intact, follows commands appropriately  Psych:  Alert with good insight.   Oriented to person, place, and time    Labs:    Recent Labs     07/14/19  2308   WBC 9.6   HGB 10.6*   HCT 33.8*        Recent Labs 07/14/19  2308      K 3.6      CO2 26   *   BUN 14   CREA 0.91   CA 8.5   ALB 2.9*   TBILI 0.4   SGOT 19   ALT 21     No results found for: GLUCPOC  No results for input(s): PH, PCO2, PO2, HCO3, FIO2 in the last 72 hours. No results for input(s): INR in the last 72 hours. No lab exists for component: INREXT    Radiology and EKG reviewed:   CXR unremarkable    **Old Records reviewed in [facility]**       Assessment/Plan:       Principal Problem:    82 yo hx of HTN, asthma, SLE, hypothyroid, non-Hodgkin's lymphoma in remission, presented w/ chest pain, SOB, possible NSTEMI    1) Acute chest pain/dyspnea: concerning for ACS given elevated trop and LBBB, however, given hx of prior lymphoma and pleuritic chest pain, PE also in DDx. Will monitor on Tele. Check d-dimer, chest CTA. Empirically start on heparin gtt, O2    2) NSTEMI (non-ST elevated myocardial infarction): initial trop 0.23. Patient reported a \"normal stress test\" last month at OSH. Will check serial enzymes, lipids, TSH. Start O2, ASA, nitro prn, IV morphine prn, heparin gtt. Consult Cards    3) Asthma: stable. Cont nebs prn, LABA/ICS    4) SLE: cont hydroxychloroquine    5) Hypothyroid: check TSH, cont synthroid    6) HTN: cont metoprolol, lisinopril.   Consult pharm for med rec    Risk of deterioration: high      Total time spent with patient: [address] [place] [address] discussed with: Patient, family, ED, nursing    Discussed:  Care Plan    Prophylaxis:  heparin gtt    Probable Disposition:  Home w/Family           ___________________________________________________    Attending Physician: Lloyd Edgar MD

## 2019-07-15 NOTE — PROGRESS NOTES
Bedside and Verbal shift change report given to Kristen Ulloa RN (oncoming nurse) by Jaja Maldonado RN  (Cath Lab Recovery Nurse). Report included the following information SBAR, Kardex, MAR and Cardiac Rhythm NSR.     1600: Patient brought up from Cath Lab recovery. No complaints of pain or discomfort. Right radial cath site D/I and without hematoma. Reminded patient not to use her right hand. Brace to right hand. Right groin cath site D/I. No hematoma. Encouraged patient to call for assistance. Call light in reach. Monitor with frequent PVCs. 1630: Family at bedside. 1730: Patient resting in bed. No complaints. 1930: Bedside and Verbal shift change report given to Kurt Mondragon RN (oncoming nurse) by Kristen Ulloa RN (offgoing nurse). Report included the following information SBAR, Kardex, MAR and Cardiac Rhythm NSR with PVCs.

## 2019-07-15 NOTE — PROGRESS NOTES
[facility]: MED RECONCILIATION    Comments/Recommendations:   Patient was alert, oriented and was a fair historian. Patient's granddaughter was at the bed side. Patient had a medication list, however strengths of Meloxicam, Levothyroxine, and Metoprolol did not match with Rx Query Fill history. Confirmed strengths with fill history from [facility]. Patient reports that Atorvastatin is a recently new prescription, added approx. . Patient denies additional OTC medication use. Medications added:     Levothyroxine 125 mc every day before breakfast  Meloxicam 15 m every day   Metoprolol succinate 50 m every day      Medications removed:    Levothyroxine 150 mcg  Meloxicam 7.5 mg  Metoprolol tartrate 50 mg     Information obtained from: Rx Query, Patient med list, Patient's granddaughter,     Allergies: Latex Allergy information confirmed with patient. Prior to Admission Medications:     Medication Documentation Review Audit       Reviewed by Venkata Loyola (Pharmacy Student) on 07/15/19 at 9109      Medication Sig Documenting Provider Last Dose Status Taking? albuterol (PROVENTIL HFA, VENTOLIN HFA, PROAIR HFA) 90 mcg/actuation inhaler Take 2 Puffs by inhalation every six (6) hours as needed for Wheezing. Poppy Shaffer MD 2019 Active Yes   alendronate (FOSAMAX) 70 mg tablet Take 70 mg by mouth every seven (7) days. Poppy Shaffer MD 2019 Active Yes   aspirin 81 mg chewable tablet Take 81 mg by mouth daily. Poppy Shaffer MD 2019 Active Yes   atorvastatin (LIPITOR) 20 mg tablet Take 20 mg by mouth daily. Poppy Shaffer MD 2019 Active Yes   budesonide-formoterol (SYMBICORT) 160-4.5 mcg/actuation HFAA Take 2 Puffs by inhalation two (2) times a day. Provider, Lilly 2019 Active Yes   Calcium-Cholecalciferol, D3, (CALCIUM 600 WITH VITAMIN D3) 600 mg(1,500mg) -400 unit cap Take 1 Tab by mouth two (2) times a day.  Poppy Shaffer MD 3/53/9049 Active Yes   folic acid (FOLVITE) 1 mg tablet Take 1 mg by mouth daily. Poppy Shaffer MD 7/14/2019 Active Yes   hydroxychloroquine (PLAQUENIL) 200 mg tablet Take 200 mg by mouth daily. Provider, Historical 7/14/2019 Active Yes   levothyroxine (SYNTHROID) 125 mcg tablet Take 125 mcg by mouth Daily (before breakfast). Provider, Historical 7/14/2019 Active Yes   lisinopril (PRINIVIL, ZESTRIL) 40 mg tablet Take 40 mg by mouth daily. Provider, Historical 7/14/2019 Active Yes   meloxicam (MOBIC) 15 mg tablet Take 15 mg by mouth daily. Provider, Historical 7/14/2019 Active Yes   metoprolol succinate (TOPROL-XL) 50 mg XL tablet Take 50 mg by mouth daily. Provider, Historical 7/14/2019 Active Yes   montelukast (SINGULAIR) 10 mg tablet Take 10 mg by mouth daily. Provider, Historical 7/14/2019 Unknown time Active Yes   multivitamin (ONE A DAY) tablet Take 1 Tab by mouth daily. Poppy Shaffer MD 7/14/2019 Unknown time Active Yes   omeprazole (PRILOSEC) 20 mg capsule Take 20 mg by mouth daily.  Provider, Historical 7/14/2019 Unknown time Active Yes                    Thank you,     Laura Stern   PharmD Candidate [id number]

## 2019-07-15 NOTE — PROGRESS NOTES
Problem: Falls - Risk of  Goal: *Absence of Falls  Description  Document Belia Santana Fall Risk and appropriate interventions in the flowsheet.   Outcome: Progressing Towards Goal     Problem: Patient Education: Go to Patient Education Activity  Goal: Patient/Family Education  Outcome: Progressing Towards Goal

## 2019-07-15 NOTE — ED PROVIDER NOTES
81 y.o. female with past medical history significant for Lupus, RA, asthma, hypothyroidism, HTN, and h/o of lymphoma (treated with radiation and chemotherapy; managed at [facility]) presents in wheelchair and accompanied by family with chief complaint of shortness of breath and a \"dull\" midsternal chest pain, currently a 3/10 in severity, onset this evening without any noted improvement. Family explains that the pt was recently admitted for a lung infection that was managed by her PCP, adding that she was treated with doxycycline at the time. Family further explains that the pt then exhibited a decreased appetite and generalized malaise a few days later, prompting them to take her to the hospital where she was again admitted. At the time, family reports that the pt had an elevated troponin and an EKG that was significant for ST elevation, noting that she was admitted soon thereafter. Per family, the pt was discharged on 06/19/19 after having a normal echocardiogram and stress test. Family reports that the pt did not follow up with cardiology, and did not have a cardiac catheterization. They also report that she was started on a cholesterol medication at discharge. Upon arrival, the pt explains that she started with a dull chest pain on 07/12/19 that resolved after taking a shower. Today, however, the pt indicates that the dull pain returned with associated shortness of breath. Family continues to explain that they found the patient \"completely collapsed over and unable to get out any words. \" They further report that \"she was clutching her chest and was unable to breathe,\" all of which prompted the EMS call. Pt's relative includes that the pt's vitals were normal at home, with reported HR of 99, BP of 160/72, and O2 saturation of 98%. The pt also includes that she took an 81 mg ASA and a nebulizer treatment, adding that the nebulizer treatment exacerbated her pain.  Pt denies any nausea, vomiting, back pain, diaphoresis, or any other symptoms at this time. There are no other acute medical concerns at this time. Social hx: No reported Tobacco use. No reported EtOH use. No reported illicit drug abuse. PCP: No primary care provider on file. Note written by Lashawn Patel, as dictated by [facility] Koko, DO 11:06 PM      The history is provided by the patient and a relative. No  was used. Past Medical History:   Diagnosis Date    Asthma     Hypertension     Hypothyroid     Lupus ([facility] [address] [facility]     Non Hodgkin's lymphoma ([facility] [address] [facility]        No past surgical history on file. No family history on file.     Social History     Socioeconomic History    Marital status:      Spouse name: Not on file    Number of children: Not on file    Years of education: Not on file    Highest education level: Not on file   Occupational History    Not on file   Social Needs    Financial resource strain: Not on file    Food insecurity:     Worry: Not on file     Inability: Not on file    Transportation needs:     Medical: Not on file     Non-medical: Not on file   Tobacco Use    Smoking status: Not on file   Substance and Sexual Activity    Alcohol use: Not on file    Drug use: Not on file    Sexual activity: Not on file   Lifestyle    Physical activity:     Days per week: Not on file     Minutes per session: Not on file    Stress: Not on file   Relationships    Social connections:     Talks on phone: Not on file     Gets together: Not on file     Attends Zoroastrian service: Not on file     Active member of club or organization: Not on file     Attends meetings of clubs or organizations: Not on file     Relationship status: Not on file    Intimate partner violence:     Fear of current or ex partner: Not on file     Emotionally abused: Not on file     Physically abused: Not on file     Forced sexual activity: Not on file   Other Topics Concern    Not on file   Social History Narrative    Not on file ALLERGIES: Latex    Review of Systems   Constitutional: Negative for appetite change, diaphoresis, fever and unexpected weight change. HENT: Negative for ear pain, hearing loss, rhinorrhea and trouble swallowing. Eyes: Negative for pain and visual disturbance. Respiratory: Positive for shortness of breath. Negative for cough and chest tightness. Cardiovascular: Positive for chest pain. Negative for palpitations. Gastrointestinal: Negative for abdominal distention, abdominal pain, blood in stool, nausea and vomiting. Genitourinary: Negative for dysuria, hematuria and urgency. Musculoskeletal: Negative for back pain and myalgias. Skin: Negative for rash. Neurological: Negative for dizziness, syncope, weakness and numbness. Psychiatric/Behavioral: Negative for confusion and suicidal ideas. All other systems reviewed and are negative. Vitals:    07/14/19 2302 07/14/19 2342   BP: 153/66 (!) 142/101   Pulse: 88 81   Resp: 25    Temp: 98.4 °F (36.9 °C)    SpO2: 99%    Weight: 66.2 kg (146 lb)    Height: 5' 2\" (1.575 m)             Physical Exam   Constitutional: She is oriented to person, place, and time. She appears well-developed and well-nourished. No distress. HENT:   Head: Normocephalic and atraumatic. Right Ear: External ear normal.   Left Ear: External ear normal.   Nose: Nose normal.   Mouth/Throat: Oropharynx is clear and moist. No oropharyngeal exudate. Eyes: Pupils are equal, round, and reactive to light. Conjunctivae and EOM are normal. Right eye exhibits no discharge. Left eye exhibits no discharge. No scleral icterus. Neck: Normal range of motion. Neck supple. No JVD present. No tracheal deviation present. Cardiovascular: Normal rate, regular rhythm, normal heart sounds and intact distal pulses. Exam reveals no gallop and no friction rub. No murmur heard. Pulmonary/Chest: Effort normal. No stridor. No respiratory distress.  She has decreased breath sounds in the right lower field and the left lower field. She has no wheezes. She has no rhonchi. She has no rales. She exhibits no tenderness. Port present over right anterior chest.   Abdominal: Soft. Bowel sounds are normal. She exhibits no distension. There is no tenderness. There is no rebound and no guarding. Musculoskeletal: Normal range of motion. She exhibits no edema or tenderness. Neurological: She is alert and oriented to person, place, and time. She has normal strength and normal reflexes. She displays normal reflexes. No cranial nerve deficit or sensory deficit. She exhibits normal muscle tone. Coordination normal. GCS eye subscore is 4. GCS verbal subscore is 5. GCS motor subscore is 6. Skin: Skin is warm and dry. No rash noted. She is not diaphoretic. No erythema. No pallor. Psychiatric: She has a normal mood and affect. Her behavior is normal. Judgment and thought content normal.   Nursing note and vitals reviewed.     Note written by Ifrah Wolfe, as dictated by Leona Glez, DO 11:06 PM     MDM  Number of Diagnoses or Management Options  Chest pain, unspecified type:   NSTEMI (non-ST elevated myocardial infarction) [facility]):   SOB (shortness of breath):      Amount and/or Complexity of Data Reviewed  Clinical lab tests: ordered and reviewed  Tests in the radiology section of CPT®: ordered and reviewed  Tests in the medicine section of CPT®: ordered and reviewed  Review and summarize past medical records: yes  Discuss the patient with other providers: yes (Hospitalist)  Independent visualization of images, tracings, or specimens: yes (ekg)    Risk of Complications, Morbidity, and/or Mortality  Presenting problems: moderate  Diagnostic procedures: low  Management options: moderate    Critical Care  Total time providing critical care: 30-74 minutes (Total critical care time spent exclusive of procedures:  35 minutes)    Patient Progress  Patient progress: stable Procedures    Chief Complaint   Patient presents with    Chest Pain    Shortness of Breath       The patient's presenting problems have been discussed, and they are in agreement with the care plan formulated and outlined with them. I have encouraged them to ask questions as they arise throughout their visit. MEDICATIONS GIVEN:  Medications   sodium chloride (NS) flush 5-40 mL ( IntraVENous Canceled Entry 7/14/19 7675)   sodium chloride (NS) flush 5-40 mL (has no administration in time range)   nitroglycerin (NITROSTAT) tablet 0.4 mg (0.4 mg SubLINGual Given 7/14/19 2327)   albuterol-ipratropium (DUO-NEB) 2.5 MG-0.5 MG/3 ML (has no administration in time range)   heparin (porcine) injection 5,300 Units (has no administration in time range)   heparin 25,000 units in D5W 250 ml infusion (has no administration in time range)   aspirin chewable tablet 162 mg (162 mg Oral Given 7/14/19 2327)   nitroglycerin (NITROBID) 2 % ointment 0.5 Inch (0.5 Inches Topical Given 7/14/19 2342)       LABS REVIEWED:  Recent Results (from the past 24 hour(s))   EKG, 12 LEAD, INITIAL    Collection Time: 07/14/19 10:58 PM   Result Value Ref Range    Ventricular Rate 86 BPM    Atrial Rate 86 BPM    P-R Interval 140 ms    QRS Duration 106 ms    Q-T Interval 368 ms    QTC Calculation (Bezet) 440 ms    Calculated P Axis 87 degrees    Calculated R Axis -22 degrees    Calculated T Axis 125 degrees    Diagnosis       Normal sinus rhythm  Incomplete left bundle branch block  Left ventricular hypertrophy with repolarization abnormality  Abnormal ECG  No previous ECGs available     SAMPLES BEING HELD    Collection Time: 07/14/19 11:08 PM   Result Value Ref Range    SAMPLES BEING HELD 1RED,1BLU,1SST     COMMENT        Add-on orders for these samples will be processed based on acceptable specimen integrity and analyte stability, which may vary by analyte.    CBC WITH AUTOMATED DIFF    Collection Time: 07/14/19 11:08 PM   Result Value Ref Range WBC 9.6 3.6 - 11.0 K/uL    RBC 4.37 3.80 - 5.20 M/uL    HGB 10.6 (L) 11.5 - 16.0 g/dL    HCT 33.8 (L) 35.0 - 47.0 %    MCV 77.3 (L) 80.0 - 99.0 FL    MCH 24.3 (L) 26.0 - 34.0 PG    MCHC 31.4 30.0 - 36.5 g/dL    RDW 15.9 (H) 11.5 - 14.5 %    PLATELET 822 283 - 497 K/uL    MPV 11.5 8.9 - 12.9 FL    NRBC 0.0 0  WBC    ABSOLUTE NRBC 0.00 0.00 - 0.01 K/uL    NEUTROPHILS 71 32 - 75 %    LYMPHOCYTES 16 12 - 49 %    MONOCYTES 9 5 - 13 %    EOSINOPHILS 3 0 - 7 %    BASOPHILS 0 0 - 1 %    IMMATURE GRANULOCYTES 1 (H) 0.0 - 0.5 %    ABS. NEUTROPHILS 6.9 1.8 - 8.0 K/UL    ABS. LYMPHOCYTES 1.5 0.8 - 3.5 K/UL    ABS. MONOCYTES 0.9 0.0 - 1.0 K/UL    ABS. EOSINOPHILS 0.3 0.0 - 0.4 K/UL    ABS. BASOPHILS 0.0 0.0 - 0.1 K/UL    ABS. IMM. GRANS. 0.1 (H) 0.00 - 0.04 K/UL    DF AUTOMATED     METABOLIC PANEL, COMPREHENSIVE    Collection Time: 07/14/19 11:08 PM   Result Value Ref Range    Sodium 139 136 - 145 mmol/L    Potassium 3.6 3.5 - 5.1 mmol/L    Chloride 107 97 - 108 mmol/L    CO2 26 21 - 32 mmol/L    Anion gap 6 5 - 15 mmol/L    Glucose 108 (H) 65 - 100 mg/dL    BUN 14 6 - 20 MG/DL    Creatinine 0.91 0.55 - 1.02 MG/DL    BUN/Creatinine ratio 15 12 - 20      GFR est AA >60 >60 ml/min/1.73m2    GFR est non-AA 59 (L) >60 ml/min/1.73m2    Calcium 8.5 8.5 - 10.1 MG/DL    Bilirubin, total 0.4 0.2 - 1.0 MG/DL    ALT (SGPT) 21 12 - 78 U/L    AST (SGOT) 19 15 - 37 U/L    Alk.  phosphatase 83 45 - 117 U/L    Protein, total 6.8 6.4 - 8.2 g/dL    Albumin 2.9 (L) 3.5 - 5.0 g/dL    Globulin 3.9 2.0 - 4.0 g/dL    A-G Ratio 0.7 (L) 1.1 - 2.2     LIPASE    Collection Time: 07/14/19 11:08 PM   Result Value Ref Range    Lipase 72 (L) 73 - 393 U/L   NT-PRO BNP    Collection Time: 07/14/19 11:08 PM   Result Value Ref Range    NT pro-BNP 2,723 (H) <450 PG/ML   CK W/ REFLX CKMB    Collection Time: 07/14/19 11:08 PM   Result Value Ref Range    CK 75 26 - 192 U/L   POC TROPONIN-I    Collection Time: 07/14/19 11:08 PM   Result Value Ref Range Troponin-I (POC) 0.17 (H) 0.00 - 0.08 ng/mL   TROPONIN I    Collection Time: 07/14/19 11:08 PM   Result Value Ref Range    Troponin-I, Qt. 0.23 (H) <0.05 ng/mL       VITAL SIGNS:  Patient Vitals for the past 24 hrs:   Temp Pulse Resp BP SpO2   07/14/19 2342  81  (!) 142/101    07/14/19 2302 98.4 °F (36.9 °C) 88 25 153/66 99 %       RADIOLOGY RESULTS:  The following have been ordered and reviewed:  Xr Chest Port    Result Date: 7/15/2019  EXAM: XR CHEST PORT INDICATION: Chest pain and shortness of breath this evening. COMPARISON: None TECHNIQUE: Upright portable chest AP view FINDINGS: Right jugular central line terminates in the superior vena cava. Cardiac monitoring wires overlie the thorax. Cardiomegaly is accentuated by portable technique. Aortic arch is not enlarged. The pulmonary vasculature is within normal limits. The lungs and pleural spaces are clear. Bones are osteopenic. IMPRESSION: Cardiomegaly. No acute pulmonary process on portable chest view. Consider PA and lateral chest views when the patient can better tolerate. ED EKG interpretation:  Rhythm: normal sinus rhythm and incomplete LBBB; and regular . Rate (approx.): 86; Axis: normal; P wave: normal; QRS interval: prolonged; ST/T wave: normal; Other findings: left ventricular hypertrophy, abnormal ekg. This EKG was interpreted by Darrell Rubio DO, ED Provider. CONSULTATIONS:   CONSULT NOTE:  1:08 AM Bindu Marin DO spoke with Dr. Smita Crenshaw, Consult for Hospitalist.  Discussed available diagnostic tests and clinical findings. Dr. Smita Crenshaw will evaluate and admit the pt to the hospital.    PROGRESS NOTES:  1:08 AM  Patient is being admitted to the hospital.  The results of their tests and reasons for their admission have been discussed with them and/or available family. They convey agreement and understanding for the need to be admitted and for their admission diagnosis. DIAGNOSIS:    1. Chest pain, unspecified type    2. NSTEMI (non-ST elevated myocardial infarction) ([facility] [address]    3. SOB (shortness of breath)        PLAN:    Admit/obs    ED COURSE: The patient's hospital course has been uncomplicated.

## 2019-07-15 NOTE — PROGRESS NOTES
Physical Therapy  Orders received and chart reviewed. Spoke with MD, he request hold for today for medical work up due to increased troponin. We will continue to follow and re-attempt tomorrow. Thank you.   Mian Tavares PT,DPT,NCS

## 2019-07-15 NOTE — PROGRESS NOTES
Qasim Goss [address]an [address]  [address] [facility], [address] [place] [address]  [phone]      Medical Progress Note      NAME:         Herlinda Starr   :        1935  MRM:        [id number]    Date:          7/15/2019      Subjective: Patient has been seen and examined as a follow up for multiple medical issues. Chart, labs, diagnostics reviewed. She says she feels much letter with less chest discomfort and less SOB. No cough or fever. Has GERD symptoms when laying down    Objective:    Vital Signs:    Visit Vitals  /71   Pulse 65   Temp 98.4 °F (36.9 °C)   Resp 18   Ht 5' 2\" (1.575 m)   Wt 66.2 kg (146 lb)   SpO2 96%   BMI 26.70 kg/m²      No intake or output data in the 24 hours ending 07/15/19 1106     Physical Examination:    General:   Weak looking elderly female patient, not in any acute distress   Eyes:   pink conjunctivae, PERRLA with no discharge. ENT:   no ottorrhea or rhinorrhea with dry mucous membranes  Neck: no masses, thyroid non-tender and trachea central.  Pulm:  no accessory muscle use, decreased but clear breath sounds without crackles or wheezes  Card:  no JVD or murmurs, has regular and normal S1, S2 without thrills, bruits or peripheral edema  Abd:  Soft, non-tender, non-distended, normoactive bowel sounds with no palpable organomegaly  Musc:  No cyanosis, clubbing, atrophy or deformities. Skin:  No rashes, bruising or ulcers. Neuro: Awake and alert.  Generally a non focal exam. Follows commands appropriately  Psych:  Has a good insight and is oriented x 3    Current Facility-Administered Medications   Medication Dose Route Frequency    albuterol-ipratropium (DUO-NEB) 2.5 MG-0.5 MG/3 ML  3 mL Nebulization Q4H PRN    heparin 25,000 units in D5W 250 ml infusion  18-36 Units/kg/hr IntraVENous TITRATE    hydroxychloroquine (PLAQUENIL) tablet 200 mg  200 mg Oral DAILY    lisinopril (PRINIVIL, ZESTRIL) tablet 40 mg  40 mg Oral DAILY    montelukast (SINGULAIR) tablet 10 mg  10 mg Oral DAILY    dextrose 5% - 0.45% NaCl with KCl 20 mEq/L infusion  50 mL/hr IntraVENous CONTINUOUS    sodium chloride (NS) flush 5-40 mL  5-40 mL IntraVENous Q8H    sodium chloride (NS) flush 5-40 mL  5-40 mL IntraVENous PRN    acetaminophen (TYLENOL) tablet 650 mg  650 mg Oral Q4H PRN    HYDROcodone-acetaminophen (NORCO) 5-325 mg per tablet 1 Tab  1 Tab Oral Q6H PRN    naloxone (NARCAN) injection 0.4 mg  0.4 mg IntraVENous PRN    diphenhydrAMINE (BENADRYL) injection 12.5 mg  12.5 mg IntraVENous Q4H PRN    ondansetron (ZOFRAN) injection 4 mg  4 mg IntraVENous Q6H PRN    docusate sodium (COLACE) capsule 100 mg  100 mg Oral BID    morphine injection 1 mg  1 mg IntraVENous Q4H PRN    nitroglycerin (NITROBID) 2 % ointment 0.5 Inch  0.5 Inch Topical BID PRN    budesonide (PULMICORT) 500 mcg/2 ml nebulizer suspension  500 mcg Nebulization BID RT    arformoterol (BROVANA) neb solution 15 mcg  15 mcg Nebulization BID RT    aspirin chewable tablet 81 mg  81 mg Oral DAILY    pantoprazole (PROTONIX) 40 mg in sodium chloride 0.9% 10 mL injection  40 mg IntraVENous Q12H    [START ON 7/16/2019] levothyroxine (SYNTHROID) tablet 125 mcg  125 mcg Oral ACB    [START ON 7/16/2019] metoprolol succinate (TOPROL-XL) XL tablet 50 mg  50 mg Oral DAILY    sodium chloride (NS) flush 5-40 mL  5-40 mL IntraVENous Q8H    sodium chloride (NS) flush 5-40 mL  5-40 mL IntraVENous PRN     Current Outpatient Medications   Medication Sig    hydroxychloroquine (PLAQUENIL) 200 mg tablet Take 200 mg by mouth daily.  lisinopril (PRINIVIL, ZESTRIL) 40 mg tablet Take 40 mg by mouth daily.  montelukast (SINGULAIR) 10 mg tablet Take 10 mg by mouth daily.  omeprazole (PRILOSEC) 20 mg capsule Take 20 mg by mouth daily.  budesonide-formoterol (SYMBICORT) 160-4.5 mcg/actuation HFAA Take 2 Puffs by inhalation two (2) times a day.     folic acid (FOLVITE) 1 mg tablet Take 1 mg by mouth daily.  multivitamin (ONE A DAY) tablet Take 1 Tab by mouth daily.  aspirin 81 mg chewable tablet Take 81 mg by mouth daily.  atorvastatin (LIPITOR) 20 mg tablet Take 20 mg by mouth daily.  levothyroxine (SYNTHROID) 125 mcg tablet Take 125 mcg by mouth Daily (before breakfast).  meloxicam (MOBIC) 15 mg tablet Take 15 mg by mouth daily.  metoprolol succinate (TOPROL-XL) 50 mg XL tablet Take 50 mg by mouth daily.  albuterol (PROVENTIL HFA, VENTOLIN HFA, PROAIR HFA) 90 mcg/actuation inhaler Take 2 Puffs by inhalation every six (6) hours as needed for Wheezing.  alendronate (FOSAMAX) 70 mg tablet Take 70 mg by mouth every seven (7) days.  Calcium-Cholecalciferol, D3, (CALCIUM 600 WITH VITAMIN D3) 600 mg(1,500mg) -400 unit cap Take 1 Tab by mouth two (2) times a day. Laboratory data and review:    Recent Labs     07/15/19  0807 07/14/19  2308   WBC 7.8 9.6   HGB 10.1* 10.6*   HCT 32.0* 33.8*    256     Recent Labs     07/14/19  2308      K 3.6      CO2 26   *   BUN 14   CREA 0.91   CA 8.5   ALB 2.9*   SGOT 19   ALT 21     No components found for: GLPOC    Other Diagnostics:    Telemetry reviewed by me:   normal sinus rhythm    Assessment and Plan:    80 yo hx of HTN, asthma, SLE, hypothyroid, non-Hodgkin's lymphoma in remission, presented w/ chest pain, SOB, possible NSTEMI     Acute chest pain + dyspnea POA: concerning for ACS given elevated trop and LBBB. CTA neg for PE. Troponin still trending up now at 0.33. Continue heparin gtt, Asprin, Metoprolol and follow Echo. Await cardiology consult for likely cath. NSTEMI (non-ST elevated myocardial infarction): initial trop 0.23. Patient reported a \"normal stress test\" last month at OSH. Troponin as above. Continue  O2, ASA, nitro prn, IV morphine prn, heparin gtt. Monitor     Asthma: stable. Not much wheezing.  Cont nebs prn, LABA/ICS     SLE: cont hydroxychloroquine     Hypothyroidism: TSH normal. Continue synthroid     HTN: BP stable. Continue metoprolol, lisinopril.       Total time spent for the patient's care: [address] discussed with: Patient, Family and Nursing Staff    Discussed:  Care Plan    Prophylaxis:  heparin gtt    Anticipated Disposition:  Home w/Family           ___________________________________________________    Attending Physician:   Jude Samson MD

## 2019-07-15 NOTE — PROGRESS NOTES
7/15/2019  8:59 AM  Reason for Admission:   Chest Pain w/SOB                  RRAT Score:     14             Do you (patient/family) have any concerns for transition/discharge? Pt has no concerns has supportive family who lives nearby that can assist pt               Plan for utilizing home health:  PT/OT evals pending; Pt is open to [facility], [place] for PT/SN Fax [phone]    Current Advanced Directive/Advance Care Plan:  Pt does not have ACP, Dtr Berto Frey (REINA) [phone] is surrogate decision maker            Transition of Care Plan:        1. Hospital admission for medical management, Cariology and GI consults, PT/OT evjoselo  2. Floor CM to follow  3. D/C to home when stable w/ resumption of HH services through [facility]  4. ACP Planning  5. Outpatient f/u cardiology, PCP  6.  Dtr Tea Pruitt will transport at d/c    Care Management Interventions  PCP Verified by CM: Lobo Snyder MD, no NN,  last visit 6/28/19)  Last Visit to PCP: 06/28/19  Palliative Care Criteria Met (RRAT>21 & CHF Dx)?: No  Mode of Transport at Discharge: Self(Dtr Berto Frey (C) [phone] will transport)  Physical Therapy Consult: Yes  Occupational Therapy Consult: Yes  Speech Therapy Consult: No  Current Support Network: Own Home, Lives Alone, Family Lives Nearby(pt lives alone in pvt residence, reportts to be ambulatory, indepndent ADLs, and drives,, familylives nearby can assist pt)  Confirm Follow Up Transport: Family  Plan discussed with Pt/Family/Caregiver: Yes  Discharge Location  Discharge Placement: Home with home health([facility])     CM met w/ pt and granddaughter to begin d/c planning, charted demographics verified, pt lives alone in 1 story home w/ 1 entry step through the back, PTA reports to be ambulatory, independent ADLs and drives, has Dtr Berto Frey (C) [phone] who lives in [place] who can assist  PCP: Jere Hernandez MD, no NN  [facility]: pt open to [facility] for PT/SN  DME: nebulizer, standard walker.   Rx: pr has [employer] Medicare A&B, Medicaid fills at [address] [place] [address]

## 2019-07-15 NOTE — PROGRESS NOTES
[id number] TRANSFER - IN REPORT:    Verbal report received from Francie(name) on [place]  being received from cath(unit) for routine progression of care      Report consisted of patients Situation, Background, Assessment and   Recommendations(SBAR). Information from the following report(s) Procedure Summary was reviewed with the receiving nurse. Opportunity for questions and clarification was provided. Assessment completed upon patients arrival to unit and care assumed. Pt voices understanding of post procedure bedrest instructions. 1:49 PM  yunior Frederick at bedside.  Care assumed by Riky Olmedo RN

## 2019-07-15 NOTE — ED NOTES
Bedside and Verbal shift change report given to Miladis CHUNG (oncoming nurse) by Ruthine Apley RN (offgoing nurse). Report included the following information SBAR, Kardex, MAR, Recent Results and Cardiac Rhythm NSR.

## 2019-07-15 NOTE — CONSULTS
Alphonso Ace MD    [address] [place], [address] [facility]    Office [phone],OHL [phone]  Pager [phone]    Date of  Admission: 7/14/2019 10:53 PM  PCP- Sung Brush MD    Torie Hodgson is a 81 y.o. female admitted for Chest pain at rest [R07.9]. Consult requested by Karla Subramanian MD    Assessment/Plan    Chest Pain - ACS  HTN  HLD  Hx of Hypothyroid/SLE/Non Hodgkin's Lymphoma in remission ( last treatment 2013)      Plan: On Nitro paste/Heparin gtt  On Prinivil PTA . HR in 60's - will not start B Blockers  Cont Lipitor  Serial trop  Records from recent w/u at [facility]  WIll need cardiac cath           I appreciate the opportunity to be involved in [facility]. See below note for details. Please do not hesitate to contact us with questions or concerns. Alphonso Ace MD    Cardiac Testing/ Procedures: A. Cardiac Cath/PCI:    B.ECHO/AUNDREA:    C.StressNuclear/Stress ECHO/Stress test:    D.Vascular:    E. EP:    F. Miscellaneous:    Care Plan discussed with: Patient and Nursing Staff    Subjective:    82 yo hx of HTN, asthma, SLE, hypothyroid, non-Hodgkin's lymphoma in remission, presented to ED with chest pain, SOB. Hx of intermittent retrosternal chest pain asoociated with dyspnea for the past few weeks. Was admitted to [facility] and had a stress test which was reported to her as being normal. Trop 0.23, 0.33. NTproBNP 2723. Currently CP free. PCP - Dr Lubna Zavala      Past Medical History:   Diagnosis Date    Asthma     Hypertension     Hypothyroid     [facility]     Non Hodgkin's lymphoma ([facility]       No past surgical history on file.   Allergies   Allergen Reactions    Latex Rash     Family History   Problem Relation Age of Onset    Heart Disease Mother       Social History     Tobacco Use    Smoking status: Not on file   Substance Use Topics    Alcohol use: Not on file    Drug use: Not on file          Medications:    (Not in a hospital admission)  Current Facility-Administered Medications   Medication Dose Route Frequency    albuterol-ipratropium (DUO-NEB) 2.5 MG-0.5 MG/3 ML  3 mL Nebulization Q4H PRN    heparin 25,000 units in D5W 250 ml infusion  18-36 Units/kg/hr IntraVENous TITRATE    hydroxychloroquine (PLAQUENIL) tablet 200 mg  200 mg Oral DAILY    lisinopril (PRINIVIL, ZESTRIL) tablet 40 mg  40 mg Oral DAILY    montelukast (SINGULAIR) tablet 10 mg  10 mg Oral DAILY    dextrose 5% - 0.45% NaCl with KCl 20 mEq/L infusion  50 mL/hr IntraVENous CONTINUOUS    sodium chloride (NS) flush 5-40 mL  5-40 mL IntraVENous Q8H    sodium chloride (NS) flush 5-40 mL  5-40 mL IntraVENous PRN    acetaminophen (TYLENOL) tablet 650 mg  650 mg Oral Q4H PRN    HYDROcodone-acetaminophen (NORCO) 5-325 mg per tablet 1 Tab  1 Tab Oral Q6H PRN    naloxone (NARCAN) injection 0.4 mg  0.4 mg IntraVENous PRN    diphenhydrAMINE (BENADRYL) injection 12.5 mg  12.5 mg IntraVENous Q4H PRN    ondansetron (ZOFRAN) injection 4 mg  4 mg IntraVENous Q6H PRN    docusate sodium (COLACE) capsule 100 mg  100 mg Oral BID    morphine injection 1 mg  1 mg IntraVENous Q4H PRN    nitroglycerin (NITROBID) 2 % ointment 0.5 Inch  0.5 Inch Topical BID PRN    budesonide (PULMICORT) 500 mcg/2 ml nebulizer suspension  500 mcg Nebulization BID RT    arformoterol (BROVANA) neb solution 15 mcg  15 mcg Nebulization BID RT    aspirin chewable tablet 81 mg  81 mg Oral DAILY    pantoprazole (PROTONIX) 40 mg in sodium chloride 0.9% 10 mL injection  40 mg IntraVENous Q12H    [START ON 7/16/2019] levothyroxine (SYNTHROID) tablet 125 mcg  125 mcg Oral ACB    [START ON 7/16/2019] metoprolol succinate (TOPROL-XL) XL tablet 50 mg  50 mg Oral DAILY    sodium chloride (NS) flush 5-40 mL  5-40 mL IntraVENous Q8H    sodium chloride (NS) flush 5-40 mL  5-40 mL IntraVENous PRN     Current Outpatient Medications   Medication Sig    hydroxychloroquine (PLAQUENIL) 200 mg tablet Take 200 mg by mouth daily.  lisinopril (PRINIVIL, ZESTRIL) 40 mg tablet Take 40 mg by mouth daily.  montelukast (SINGULAIR) 10 mg tablet Take 10 mg by mouth daily.  omeprazole (PRILOSEC) 20 mg capsule Take 20 mg by mouth daily.  budesonide-formoterol (SYMBICORT) 160-4.5 mcg/actuation HFAA Take 2 Puffs by inhalation two (2) times a day.  folic acid (FOLVITE) 1 mg tablet Take 1 mg by mouth daily.  multivitamin (ONE A DAY) tablet Take 1 Tab by mouth daily.  aspirin 81 mg chewable tablet Take 81 mg by mouth daily.  atorvastatin (LIPITOR) 20 mg tablet Take 20 mg by mouth daily.  levothyroxine (SYNTHROID) 125 mcg tablet Take 125 mcg by mouth Daily (before breakfast).  meloxicam (MOBIC) 15 mg tablet Take 15 mg by mouth daily.  metoprolol succinate (TOPROL-XL) 50 mg XL tablet Take 50 mg by mouth daily.  albuterol (PROVENTIL HFA, VENTOLIN HFA, PROAIR HFA) 90 mcg/actuation inhaler Take 2 Puffs by inhalation every six (6) hours as needed for Wheezing.  alendronate (FOSAMAX) 70 mg tablet Take 70 mg by mouth every seven (7) days.  Calcium-Cholecalciferol, D3, (CALCIUM 600 WITH VITAMIN D3) 600 mg(1,500mg) -400 unit cap Take 1 Tab by mouth two (2) times a day. Review of Systems:  (bold if positive, if negative)    As in HPI - rest of ROS noncontributory        Physical Exam:  Visit Vitals  /71   Pulse 65   Temp 98.4 °F (36.9 °C)   Resp 18   Ht 5' 2\" (1.575 m)   Wt 146 lb (66.2 kg)   SpO2 96%   BMI 26.70 kg/m²         Telemetry: normal sinus rhythm    Gen: Well-developed, well-nourished, in no acute distress, elderly  HEENT:  Pink conjunctivae, hearing intact to voice, moist mucous membranes  Neck: Supple,No JVD, No Carotid Bruit, Thyroid- non tender  Resp: No accessory muscle use, Clear breath sounds, No rales or rhonchi  Card: Regular Rate,Rythm,Normal S1, S2, 2/6 sys murmurs+ No rubs or gallop. No thrills.   R Chest - port present  Abd:  Soft, non-tender, non-distended, normoactive bowel sounds are present,   MSK: No cyanosis   Skin: No rashes   Neuro:   moving all four extremities, no focal deficit, follows commands appropriately  Psych:  Good insight, oriented to person, place and time, alert, Nml Affect  LE: No edema  Vascular:Radial Pulses 2+ and symmetric        EKG:  SR/LVH with STT chenges/IVCD. Cxray: Cardiomegaly. No acute pulmonary process on portable chest view. Consider PA and  lateral chest views when the patient can better tolerate.     LABS:        Lab Results   Component Value Date/Time    WBC 7.8 07/15/2019 08:07 AM    HGB 10.1 (L) 07/15/2019 08:07 AM    HCT 32.0 (L) 07/15/2019 08:07 AM    PLATELET 656 14/27/4093 08:07 AM    MCV 77.1 (L) 07/15/2019 08:07 AM     Lab Results   Component Value Date/Time    Sodium 139 07/14/2019 11:08 PM    Potassium 3.6 07/14/2019 11:08 PM    Chloride 107 07/14/2019 11:08 PM    CO2 26 07/14/2019 11:08 PM    Anion gap 6 07/14/2019 11:08 PM    Glucose 108 (H) 07/14/2019 11:08 PM    BUN 14 07/14/2019 11:08 PM    Creatinine 0.91 07/14/2019 11:08 PM    BUN/Creatinine ratio 15 07/14/2019 11:08 PM    GFR est AA >60 07/14/2019 11:08 PM    GFR est non-AA 59 (L) 07/14/2019 11:08 PM    Calcium 8.5 07/14/2019 11:08 PM     Lab Results   Component Value Date/Time    CK 51 07/15/2019 08:07 AM    CK-MB Index 4.7 (H) 07/15/2019 08:07 AM    Troponin-I, Qt. 0.33 (H) 07/15/2019 08:07 AM     Lab Results   Component Value Date/Time    aPTT 93.1 (HH) 07/15/2019 08:07 AM     No results found for: INR, PTMR, PTP, PT1, PT2  No results found for: BNP, BNPP, XBNPT      Ronna Stone MD

## 2019-07-15 NOTE — ED TRIAGE NOTES
Pt w/co chest pain and shortness of breath tonight.   Per family, she was bent over, clutching her chest.  Patient with recent hospitalization for a chest infection, as well as an admit for elevated troponin with a chemical stress test and follow up with primary

## 2019-07-15 NOTE — PROGRESS NOTES
Occupational therapy note:  Orders received, chart reviewed. Spoke with MD who requesting hold therapy today for medical work up due to increased troponin and pending cardiology consult. Will follow up with patient for attempt for OT evaluation tomorrow. Elin Cuevas MS OTR/L

## 2019-07-15 NOTE — PROGRESS NOTES
[facility]: MED RECONCILIATION    Comments/Recommendations:   Patient was alert, oriented and was a fair historian. Patient's granddaughter was at the bed side. Patient had a medication list, however strengths of Meloxicam, Levothyroxine, and Metoprolol did not match with Rx Query Fill history. Confirmed strengths with fill history from [facility]. Patient reports that Atorvastatin is a recently new prescription, added approx. . Patient denies additional OTC medication use. Medications added:     Levothyroxine 125 mc every day before breakfast  Meloxicam 15 m every day   Metoprolol succinate 50 m every day      Medications removed:    Levothyroxine 150 mcg  Meloxicam 7.5 mg  Metoprolol tartrate 50 mg     Information obtained from: Rx Query, Patient med list, Patient's granddaughter,     Allergies: Latex Allergy information confirmed with patient. Prior to Admission Medications:     Medication Documentation Review Audit       Reviewed by Portia Hicks (Pharmacy Student) on 07/15/19 at 2386      Medication Sig Documenting Provider Last Dose Status Taking? albuterol (PROVENTIL HFA, VENTOLIN HFA, PROAIR HFA) 90 mcg/actuation inhaler Take 2 Puffs by inhalation every six (6) hours as needed for Wheezing. Poppy Shaffer MD 2019 Active Yes   alendronate (FOSAMAX) 70 mg tablet Take 70 mg by mouth every seven (7) days. Poppy Shaffer MD 2019 Active Yes   aspirin 81 mg chewable tablet Take 81 mg by mouth daily. Poppy Shaffer MD 2019 Active Yes   atorvastatin (LIPITOR) 20 mg tablet Take 20 mg by mouth daily. Poppy Shaffer MD 2019 Active Yes   budesonide-formoterol (SYMBICORT) 160-4.5 mcg/actuation HFAA Take 2 Puffs by inhalation two (2) times a day. Provider, Lilly 2019 Active Yes   Calcium-Cholecalciferol, D3, (CALCIUM 600 WITH VITAMIN D3) 600 mg(1,500mg) -400 unit cap Take 1 Tab by mouth two (2) times a day.  Poppy Shaffer MD 6/70/1217 Active Yes   folic acid (FOLVITE) 1 mg tablet Take 1 mg by mouth daily. Poppy Shaffer MD 7/14/2019 Active Yes   hydroxychloroquine (PLAQUENIL) 200 mg tablet Take 200 mg by mouth daily. Provider, Historical 7/14/2019 Active Yes   levothyroxine (SYNTHROID) 125 mcg tablet Take 125 mcg by mouth Daily (before breakfast). Provider, Historical 7/14/2019 Active Yes   lisinopril (PRINIVIL, ZESTRIL) 40 mg tablet Take 40 mg by mouth daily. Provider, Historical 7/14/2019 Active Yes   meloxicam (MOBIC) 15 mg tablet Take 15 mg by mouth daily. Provider, Historical 7/14/2019 Active Yes   metoprolol succinate (TOPROL-XL) 50 mg XL tablet Take 50 mg by mouth daily. Provider, Historical 7/14/2019 Active Yes   montelukast (SINGULAIR) 10 mg tablet Take 10 mg by mouth daily. Provider, Historical 7/14/2019 Unknown time Active Yes   multivitamin (ONE A DAY) tablet Take 1 Tab by mouth daily. Deena, MD Poppy 7/14/2019 Unknown time Active Yes   omeprazole (PRILOSEC) 20 mg capsule Take 20 mg by mouth daily.  Provider, Historical 7/14/2019 Unknown time Active Yes                    Thank you,     Farideh Ignacio   PharmD Candidate [id number]

## 2019-07-16 ENCOUNTER — APPOINTMENT (OUTPATIENT)
Dept: GENERAL RADIOLOGY | Age: 84
DRG: 247 | End: 2019-07-16
Attending: NURSE PRACTITIONER
Payer: MEDICARE

## 2019-07-16 VITALS
DIASTOLIC BLOOD PRESSURE: 98 MMHG | HEART RATE: 81 BPM | WEIGHT: 143.7 LBS | BODY MASS INDEX: 26.44 KG/M2 | RESPIRATION RATE: 20 BRPM | TEMPERATURE: 98.6 F | OXYGEN SATURATION: 97 % | HEIGHT: 62 IN | SYSTOLIC BLOOD PRESSURE: 128 MMHG

## 2019-07-16 PROBLEM — R07.9 CHEST PAIN AT REST: Status: RESOLVED | Noted: 2019-07-15 | Resolved: 2019-07-16

## 2019-07-16 PROBLEM — I10 HTN (HYPERTENSION), BENIGN: Status: ACTIVE | Noted: 2019-07-16

## 2019-07-16 PROBLEM — I25.118 CORONARY ARTERY DISEASE OF NATIVE ARTERY OF NATIVE HEART WITH STABLE ANGINA PECTORIS (HCC): Status: ACTIVE | Noted: 2019-07-16

## 2019-07-16 PROBLEM — R07.9 ACUTE CHEST PAIN: Status: RESOLVED | Noted: 2019-07-15 | Resolved: 2019-07-16

## 2019-07-16 LAB
ALBUMIN SERPL-MCNC: 2.4 G/DL (ref 3.5–5)
ALBUMIN/GLOB SERPL: 0.8 {RATIO} (ref 1.1–2.2)
ALP SERPL-CCNC: 60 U/L (ref 45–117)
ALT SERPL-CCNC: 15 U/L (ref 12–78)
ANION GAP SERPL CALC-SCNC: 7 MMOL/L (ref 5–15)
AST SERPL-CCNC: 13 U/L (ref 15–37)
BILIRUB DIRECT SERPL-MCNC: 0.1 MG/DL (ref 0–0.2)
BILIRUB SERPL-MCNC: 0.3 MG/DL (ref 0.2–1)
BUN SERPL-MCNC: 12 MG/DL (ref 6–20)
BUN/CREAT SERPL: 14 (ref 12–20)
CALCIUM SERPL-MCNC: 8 MG/DL (ref 8.5–10.1)
CHLORIDE SERPL-SCNC: 111 MMOL/L (ref 97–108)
CHOLEST SERPL-MCNC: 91 MG/DL
CO2 SERPL-SCNC: 24 MMOL/L (ref 21–32)
CREAT SERPL-MCNC: 0.87 MG/DL (ref 0.55–1.02)
ECHO AO ROOT DIAM: 2.71 CM
ECHO AV AREA PEAK VELOCITY: 1.3 CM2
ECHO AV PEAK GRADIENT: 9.3 MMHG
ECHO AV PEAK VELOCITY: 152.77 CM/S
ECHO AV REGURGITANT PHT: 559.2 CM
ECHO EST RA PRESSURE: 3 MMHG
ECHO LA AREA 4C: 22.7 CM2
ECHO LA MAJOR AXIS: 4.91 CM
ECHO LA TO AORTIC ROOT RATIO: 1.81
ECHO LA VOL 2C: 97.67 ML (ref 22–52)
ECHO LA VOL 4C: 73.03 ML (ref 22–52)
ECHO LA VOL BP: 85.2 ML (ref 22–52)
ECHO LA VOL/BSA BIPLANE: 50.95 ML/M2 (ref 16–28)
ECHO LA VOLUME INDEX A2C: 58.41 ML/M2 (ref 16–28)
ECHO LA VOLUME INDEX A4C: 43.67 ML/M2 (ref 16–28)
ECHO LV E' LATERAL VELOCITY: 8.8 CENTIMETER/SECOND
ECHO LV E' SEPTAL VELOCITY: 7.49 CENTIMETER/SECOND
ECHO LV INTERNAL DIMENSION DIASTOLIC: 5.26 CM (ref 3.9–5.3)
ECHO LV INTERNAL DIMENSION SYSTOLIC: 3.78 CM
ECHO LV IVSD: 0.77 CM (ref 0.6–0.9)
ECHO LV MASS 2D: 168.1 G (ref 67–162)
ECHO LV MASS INDEX 2D: 100.5 G/M2 (ref 43–95)
ECHO LV POSTERIOR WALL DIASTOLIC: 0.82 CM (ref 0.6–0.9)
ECHO LVOT DIAM: 1.86 CM
ECHO LVOT PEAK GRADIENT: 2 MMHG
ECHO LVOT PEAK VELOCITY: 70.98 CM/S
ECHO MV A VELOCITY: 63.48 CM/S
ECHO MV AREA PHT: 4.9 CM2
ECHO MV E DECELERATION TIME (DT): 153.5 MS
ECHO MV E VELOCITY: 96.6 CM/S
ECHO MV E/A RATIO: 1.52
ECHO MV PRESSURE HALF TIME (PHT): 44.5 MS
ECHO MV REGURGITANT PEAK GRADIENT: 98.6 MMHG
ECHO MV REGURGITANT PEAK VELOCITY: 496.41 CM/S
ECHO PULMONARY ARTERY SYSTOLIC PRESSURE (PASP): 49.7 MMHG
ECHO PV MAX VELOCITY: 64.89 CM/S
ECHO PV PEAK GRADIENT: 1.7 MMHG
ECHO RIGHT VENTRICULAR SYSTOLIC PRESSURE (RVSP): 49.7 MMHG
ECHO RV INTERNAL DIMENSION: 3.31 CM
ECHO RV TAPSE: 1.77 CM (ref 1.5–2)
ECHO TV REGURGITANT MAX VELOCITY: 341.69 CM/S
ECHO TV REGURGITANT PEAK GRADIENT: 46.7 MMHG
ERYTHROCYTE [DISTWIDTH] IN BLOOD BY AUTOMATED COUNT: 16.2 % (ref 11.5–14.5)
EST. AVERAGE GLUCOSE BLD GHB EST-MCNC: 134 MG/DL
GLOBULIN SER CALC-MCNC: 3 G/DL (ref 2–4)
GLUCOSE SERPL-MCNC: 81 MG/DL (ref 65–100)
HBA1C MFR BLD: 6.3 % (ref 4.2–6.3)
HCT VFR BLD AUTO: 27.9 % (ref 35–47)
HDLC SERPL-MCNC: 31 MG/DL
HDLC SERPL: 2.9 {RATIO} (ref 0–5)
HGB BLD-MCNC: 8.7 G/DL (ref 11.5–16)
LDLC SERPL CALC-MCNC: 41 MG/DL (ref 0–100)
LIPID PROFILE,FLP: NORMAL
MAGNESIUM SERPL-MCNC: 2 MG/DL (ref 1.6–2.4)
MCH RBC QN AUTO: 23.8 PG (ref 26–34)
MCHC RBC AUTO-ENTMCNC: 31.2 G/DL (ref 30–36.5)
MCV RBC AUTO: 76.2 FL (ref 80–99)
NRBC # BLD: 0 K/UL (ref 0–0.01)
NRBC BLD-RTO: 0 PER 100 WBC
PHOSPHATE SERPL-MCNC: 3.4 MG/DL (ref 2.6–4.7)
PISA AR MAX VEL: 475.85 CM/S
PLATELET # BLD AUTO: 216 K/UL (ref 150–400)
PMV BLD AUTO: 11.3 FL (ref 8.9–12.9)
POTASSIUM SERPL-SCNC: 3.9 MMOL/L (ref 3.5–5.1)
PROT SERPL-MCNC: 5.4 G/DL (ref 6.4–8.2)
RBC # BLD AUTO: 3.66 M/UL (ref 3.8–5.2)
SODIUM SERPL-SCNC: 142 MMOL/L (ref 136–145)
TRIGL SERPL-MCNC: 95 MG/DL (ref ?–150)
VLDLC SERPL CALC-MCNC: 19 MG/DL
WBC # BLD AUTO: 6.5 K/UL (ref 3.6–11)

## 2019-07-16 PROCEDURE — C9113 INJ PANTOPRAZOLE SODIUM, VIA: HCPCS | Performed by: INTERNAL MEDICINE

## 2019-07-16 PROCEDURE — 97116 GAIT TRAINING THERAPY: CPT

## 2019-07-16 PROCEDURE — 80048 BASIC METABOLIC PNL TOTAL CA: CPT

## 2019-07-16 PROCEDURE — 74011250636 HC RX REV CODE- 250/636: Performed by: INTERNAL MEDICINE

## 2019-07-16 PROCEDURE — 80061 LIPID PANEL: CPT

## 2019-07-16 PROCEDURE — 85027 COMPLETE CBC AUTOMATED: CPT

## 2019-07-16 PROCEDURE — 74011250637 HC RX REV CODE- 250/637: Performed by: INTERNAL MEDICINE

## 2019-07-16 PROCEDURE — 97535 SELF CARE MNGMENT TRAINING: CPT

## 2019-07-16 PROCEDURE — 94640 AIRWAY INHALATION TREATMENT: CPT

## 2019-07-16 PROCEDURE — 84100 ASSAY OF PHOSPHORUS: CPT

## 2019-07-16 PROCEDURE — 71045 X-RAY EXAM CHEST 1 VIEW: CPT

## 2019-07-16 PROCEDURE — 74011000250 HC RX REV CODE- 250: Performed by: INTERNAL MEDICINE

## 2019-07-16 PROCEDURE — 83036 HEMOGLOBIN GLYCOSYLATED A1C: CPT

## 2019-07-16 PROCEDURE — 80076 HEPATIC FUNCTION PANEL: CPT

## 2019-07-16 PROCEDURE — 97165 OT EVAL LOW COMPLEX 30 MIN: CPT

## 2019-07-16 PROCEDURE — 36415 COLL VENOUS BLD VENIPUNCTURE: CPT

## 2019-07-16 PROCEDURE — 97161 PT EVAL LOW COMPLEX 20 MIN: CPT

## 2019-07-16 PROCEDURE — 83735 ASSAY OF MAGNESIUM: CPT

## 2019-07-16 RX ORDER — HEPARIN 100 UNIT/ML
500 SYRINGE INTRAVENOUS
Status: COMPLETED | OUTPATIENT
Start: 2019-07-16 | End: 2019-07-16

## 2019-07-16 RX ORDER — CLOPIDOGREL BISULFATE 75 MG/1
75 TABLET ORAL DAILY
Qty: 30 TAB | Refills: 11 | Status: SHIPPED | OUTPATIENT
Start: 2019-07-17 | End: 2021-03-23

## 2019-07-16 RX ORDER — BUMETANIDE 0.5 MG/1
0.5 TABLET ORAL DAILY
Qty: 30 TAB | Refills: 0 | Status: SHIPPED | OUTPATIENT
Start: 2019-07-16 | End: 2020-10-13

## 2019-07-16 RX ORDER — PANTOPRAZOLE SODIUM 40 MG/1
40 TABLET, DELAYED RELEASE ORAL
Status: DISCONTINUED | OUTPATIENT
Start: 2019-07-16 | End: 2019-07-16 | Stop reason: HOSPADM

## 2019-07-16 RX ORDER — BUMETANIDE 0.25 MG/ML
1 INJECTION INTRAMUSCULAR; INTRAVENOUS ONCE
Status: COMPLETED | OUTPATIENT
Start: 2019-07-16 | End: 2019-07-16

## 2019-07-16 RX ADMIN — ARFORMOTEROL TARTRATE 15 MCG: 15 SOLUTION RESPIRATORY (INHALATION) at 07:21

## 2019-07-16 RX ADMIN — Medication 10 ML: at 06:00

## 2019-07-16 RX ADMIN — LEVOTHYROXINE SODIUM 125 MCG: 125 TABLET ORAL at 05:37

## 2019-07-16 RX ADMIN — BUMETANIDE 1 MG: 0.25 INJECTION INTRAMUSCULAR; INTRAVENOUS at 11:41

## 2019-07-16 RX ADMIN — Medication 10 ML: at 05:38

## 2019-07-16 RX ADMIN — MONTELUKAST SODIUM 10 MG: 10 TABLET, FILM COATED ORAL at 08:09

## 2019-07-16 RX ADMIN — DEXTROSE MONOHYDRATE, SODIUM CHLORIDE, AND POTASSIUM CHLORIDE 50 ML/HR: 50; 4.5; 1.49 INJECTION, SOLUTION INTRAVENOUS at 04:34

## 2019-07-16 RX ADMIN — PANTOPRAZOLE SODIUM 40 MG: 40 INJECTION, POWDER, FOR SOLUTION INTRAVENOUS at 08:08

## 2019-07-16 RX ADMIN — HYDROXYCHLOROQUINE SULFATE 200 MG: 200 TABLET, FILM COATED ORAL at 08:08

## 2019-07-16 RX ADMIN — LISINOPRIL 40 MG: 20 TABLET ORAL at 08:09

## 2019-07-16 RX ADMIN — Medication 500 UNITS: at 13:42

## 2019-07-16 RX ADMIN — BUDESONIDE 500 MCG: 0.5 INHALANT RESPIRATORY (INHALATION) at 07:21

## 2019-07-16 RX ADMIN — DOCUSATE SODIUM 100 MG: 100 CAPSULE, LIQUID FILLED ORAL at 08:09

## 2019-07-16 RX ADMIN — METOPROLOL SUCCINATE 50 MG: 50 TABLET, FILM COATED, EXTENDED RELEASE ORAL at 08:08

## 2019-07-16 RX ADMIN — CLOPIDOGREL BISULFATE 75 MG: 75 TABLET ORAL at 08:09

## 2019-07-16 RX ADMIN — ASPIRIN 81 MG 81 MG: 81 TABLET ORAL at 08:09

## 2019-07-16 NOTE — DISCHARGE SUMMARY
[facility]  [address] [facility], [address] [place] [address] [place]  Tel: [phone]    Physician Discharge Summary    Patient ID:    Charls Boeck  Age:              81 y.o.    : 1935  MRN:             [id number]     PCP: Roverto Hernandez MD     Admit date: 2019    Discharge date: 2019    Principal admission Diagnosis:   Chest pain at rest [R07.9]    Discharge Diagnoses:  Principal Problem:    NSTEMI (non-ST elevated myocardial infarction) ([facility] (7/15/2019)    Coronary artery disease of native artery of native heart with stable angina pectoris ([facility] (2019)    HTN (hypertension), benign (2019)    Lupus ([facility] (7/15/2019)    Asthma (7/15/2019)    Hypothyroid (7/15/2019)    Consults: Cardiology    Hospital Course:     80 yo hx of HTN, asthma, SLE, hypothyroid, non-Hodgkin's lymphoma in remission, presented w/ chest pain, SOB, NSTEMI     NSTEMI (non-ST elevated myocardial infarction) /Coronary artery disease of native artery of native heart with stable angina pectoris ([facility] (2019): admitted with chest pain and SOB. Troponin 0.33 and  CTA neg for PE. Seen by cardiology and had a cardiac cath and she is sp  PCI to RCA with VINOD. Echo showed preserved LV function. She had some basal crackles and CXR done  showed a mild interstitial edema. She received IV bumex and will be discharged home on Asprin, metoprolol, Plavix, Lipitor and low dose bumex. Close follow up with cardiology. Asthma: stable. Not much wheezing. Cont nebs prn, LABA/ICS. She has an appointment with Pulmonology     SLE: cont hydroxychloroquine     Hypothyroidism: TSH normal. Continue synthroid     HTN: BP stable.  Continue metoprolol, lisinopril.      Discharge Exam:    Visit Vitals  BP (P) 135/80 (BP 1 Location: Left arm)   Pulse (P) 85   Temp 99.1 °F (37.3 °C)   Resp 24   Ht 5' 2\" (1.575 m)   Wt 65.2 kg (143 lb 11.2 oz)   SpO2 94%   BMI 26.28 kg/m²      General: well looking and stable patient in no acute distress  Pulm: clear breath sounds without wheezes  Card: no murmurs, normal S1, S2 without thrills, bruits   Abd:    soft, non-tender, normoactive bowel sounds  Skin: no rashes or ulcers, skin turgor is good  Neuro: awake, alert and has a non focal     Activity: Activity as tolerated    Diet: Cardiac Diet and Diabetic Diet    Current Discharge Medication List      START taking these medications    Details   clopidogrel (PLAVIX) 75 mg tab Take 1 Tab by mouth daily. Qty: 30 Tab, Refills: 11      bumetanide (BUMEX) 0.5 mg tablet Take 1 Tab by mouth daily. Qty: 30 Tab, Refills: 0         CONTINUE these medications which have NOT CHANGED    Details   hydroxychloroquine (PLAQUENIL) 200 mg tablet Take 200 mg by mouth daily. lisinopril (PRINIVIL, ZESTRIL) 40 mg tablet Take 40 mg by mouth daily. montelukast (SINGULAIR) 10 mg tablet Take 10 mg by mouth daily. omeprazole (PRILOSEC) 20 mg capsule Take 20 mg by mouth daily. budesonide-formoterol (SYMBICORT) 160-4.5 mcg/actuation HFAA Take 2 Puffs by inhalation two (2) times a day. folic acid (FOLVITE) 1 mg tablet Take 1 mg by mouth daily. multivitamin (ONE A DAY) tablet Take 1 Tab by mouth daily. aspirin 81 mg chewable tablet Take 81 mg by mouth daily. atorvastatin (LIPITOR) 20 mg tablet Take 20 mg by mouth daily. levothyroxine (SYNTHROID) 125 mcg tablet Take 125 mcg by mouth Daily (before breakfast). metoprolol succinate (TOPROL-XL) 50 mg XL tablet Take 50 mg by mouth daily. albuterol (PROVENTIL HFA, VENTOLIN HFA, PROAIR HFA) 90 mcg/actuation inhaler Take 2 Puffs by inhalation every six (6) hours as needed for Wheezing. alendronate (FOSAMAX) 70 mg tablet Take 70 mg by mouth every seven (7) days. Calcium-Cholecalciferol, D3, (CALCIUM 600 WITH VITAMIN D3) 600 mg(1,500mg) -400 unit cap Take 1 Tab by mouth two (2) times a day.          STOP taking these medications       meloxicam (MOBIC) 15 mg tablet Comments:   Reason for Stopping: Follow-up Information     Follow up With Specialties Details Why Contact Info    Peyton Varghese MD Cardiology On 8/1/2019 2 pm  [address] [facility]  [address]  [facility] [address] Maranda Nunes MD Internal Medicine Go on 7/29/2019 For hospital follow up appointment at 10:15AM [address] [facility]   THEY WILL CONTACT YOU TO SCHEDULE A HOME VISIT [phone]    [facility]   THEY SUPPLIED [address] [facility] MACHINE [address]  [phone]    Pj Oh MD Pulmonary Disease, Critical Care Medicine On 7/16/2019  [address] [facility]  1st Floor  [address] [facility]  [phone]      Peyton Varghese MD Cardiology On 7/19/2019 140 pm [address]  [facility]  [phone]            Follow-up tests or labs: None    Discharge Condition: Stable    Disposition: home    Time taken to arrange discharge:  35 minutes.     Signed:  Wilmer Anthony MD     [facility]  7/16/2019   11:30 AM

## 2019-07-16 NOTE — DISCHARGE INSTRUCTIONS
Have lab (BMP) work done with your Primary Care Doctor       Radial Cardiac Catheterization/Angiography Discharge Instructions     It is normal to feel tired the first couple days. Take it easy and follow the physicians instructions. CHECK THE CATHETER INSERTION SITE DAILY:   Remove the wrist dressing 24 hours after the procedure. You may shower 24 hours after the procedure. Wash with soap and water and pat dry. Gentle cleaning of the site with soap and water is sufficient, cover with a dry clean dressing or bandage. Do not apply creams or powders to the area. No soaking the wrist for 3 days. Leave the puncture site open to air after 24 hours post-procedure. CALL THE PHYSICIANS:   If the site becomes red, swollen or feels warm to the touch   If there is bleeding or drainage or if there is unusual pain at the radial site. If there is any minor oozing, you may apply a band-aid and remove after 12 hours. If the bleeding continues, hold pressure with the middle finger against the puncture site and the thumb against the back of the wrist,call 911 to be transported to the hospital.   DO NOT DRIVE YOURSELF, [address]. ACTIVITY:   For the first 24 hours do not manipulate the wrist.   No lifting, pushing or pulling over 3-5 pounds with the affected wrist for 7 daysand no straining the insertion site. Do not life grocery bags or the garbage can, do not run the vacuum  or  for 7 days. Start with short walks as in the hospital and gradually increase as tolerated each day. It is recommended to walk 30 minutes 5-7 days per week. Follow your physicians instructions on activity. Avoid walking outside in extremes of heat or cold. Walk inside when it is cold and windy or hot and humid. Things to keep in mind:   No driving for at least 24 hours, or as designated by your physician.    Limit the number of times you go up and down the stairs   Take rests and pace yourself with activity. Be careful and do not strain with bowel movements. MEDICATIONS:   Take all medications as prescribed   Call your physician if you have any questions   Keep an updated list of your medications with you at all times and give a list to your physician and pharmacist   SIGNS AND SYMPTOMS:   Be cautious of symptoms of angina or recurrent symptoms such as chest discomfort, unusual shortness of breath or fatigue. These could be symptoms of restenosis, a new blockage or a heart attack. If your symptoms are relieved with rest it is still recommended that you notify your physician of recurrent chest pain or discomfort. For CHEST PAIN or symptoms of angina not relieved with rest: If the discomfort is not relieved with rest, and you have been prescribed Nitroglycerin, take as directed (taken under the tongue, one at a time 5 minutes apart for a total of 3 doses). If the discomfort is not relieved after the 3rd nitroglycerin, call 911. If you have not been prescribed Nitroglycerin and your chest discomfort is not relieved with rest, call 911. AFTER CARE:   Follow up with your physician as instructed. Follow a heart healthy diet with proper portion control, daily stress management, daily exercise, blood pressure and cholesterol control , and smoking cessation. Patient Education        Heart Attack: Care Instructions  Your Care Instructions    A heart attack (myocardial infarction, or MI) occurs when one or more of the coronary arteries, which supply the heart with oxygen-rich blood, is blocked. A blockage usually occurs when plaque inside the artery breaks open and a blood clot forms in the artery. After a heart attack, you may be worried about your future. Over the next several weeks, your heart will start to heal. Though it can be hard to break old habits, you can prevent another heart attack by making some lifestyle changes and by taking medicines.  You may use this information for ideas about what to do at home to speed your recovery. Follow-up care is a key part of your treatment and safety. Be sure to make and go to all appointments, and call your doctor if you are having problems. It's also a good idea to know your test results and keep a list of the medicines you take. How can you care for yourself at home? Activity    · Until your doctor says it is okay, do not do strenuous exercise. And do not lift, pull, or push anything heavy. Ask your doctor what types of activities are safe for you.     · If your doctor has not set you up with a cardiac rehabilitation (rehab) program, talk to him or her about whether that is right for you. Cardiac rehab includes supervised exercise. It also includes help with diet and lifestyle changes and emotional support. It may reduce your risk of future heart problems.     · Increase your activities slowly. Take short rest breaks when you get tired.     · If your doctor recommends it, get more exercise. Walking is a good choice. Bit by bit, increase the amount you walk every day. Try for at least 30 minutes on most days of the week. You also may want to swim, bike, or do other activities. Talk with your doctor before you start an exercise program to make sure it is safe for you.     · Ask your doctor when you can drive, go back to work, and do other daily activities again.     · You can have sex as soon as you feel ready for it. Often this means when you can easily walk around or climb stairs. Talk with your doctor if you have any concerns. If you are taking nitroglycerin, do not take erection-enhancing medicine such as sildenafil (Viagra), tadalafil (Cialis), or vardenafil (Levitra) .    Lifestyle changes    · Do not smoke. Smoking increases your risk of another heart attack. If you need help quitting, talk to your doctor about stop-smoking programs and medicines.  These can increase your chances of quitting for good.     · Eat a heart-healthy diet that is low in saturated fat and salt, and is full of fruits, vegetables and whole-grains. Eat at least two servings of fish each week. You may get more details about how to eat healthy. But these tips can help you get started.     · Stay at a healthy weight, or lose weight if you need to. Medicines    · Be safe with medicines. Take your medicines exactly as prescribed. Call your doctor if you think you are having a problem with your medicine. You will get more details on the specific medicines your doctor prescribes. Do not stop taking your medicine unless your doctor tells you to. Not taking your medicine might raise your risk of having another heart attack.     · You may need several medicines to help lower your risk of another heart attack. These include:  ? Blood pressure medicines such as angiotensin-converting enzyme (ACE) inhibitors, ARBs (angiotensin II receptor blockers), and beta-blockers. ? Cholesterol medicine called statins. ? Aspirin and other blood thinners. These prevent blood clots that can cause a heart attack.     · If your doctor has given you nitroglycerin, keep it with you at all times. If you have angina symptoms, such as chest pain or pressure, sit down and rest. Take the first dose of nitroglycerin as directed. If symptoms get worse or are not getting better within 5 minutes, call 911 right away. Stay on the phone. The emergency  will tell you what to do.     · Do not take any over-the-counter medicines, vitamins, or herbal products without talking to your doctor first.    Staying healthy    · Manage other health conditions such as high blood pressure and diabetes.     · Avoid colds and flu. Get a pneumococcal vaccine shot. If you have had one before, ask your doctor whether you need another dose. Get the flu vaccine every year.  If you must be around people with colds or flu, wash your hands often.     · Be sure to tell your doctor about any angina symptoms you have had, even if they went away. Pay attention to your angina symptoms. Know what is typical for you and learn how to control it. Know when to call for help.     · Talk to your family, friends, or a counselor about your feelings. It is normal to feel frightened, angry, hopeless, helpless, and even guilty. Talking openly about bad feelings can help you cope. If you have symptoms of depression, talk to your doctor. When should you call for help? Call 911 anytime you think you may need emergency care. For example, call if:    · You have symptoms of a heart attack. These may include:  ? Chest pain or pressure, or a strange feeling in the chest.  ? Sweating. ? Shortness of breath. ? Nausea or vomiting. ? Pain, pressure, or a strange feeling in the back, neck, jaw, or upper belly or in one or both shoulders or arms. ? Lightheadedness or sudden weakness. ? A fast or irregular heartbeat. After you call 911, the  may tell you to chew 1 adult-strength or 2 to 4 low-dose aspirin. Wait for an ambulance. Do not try to drive yourself.     · You have angina symptoms (such as chest pain or pressure) that do not go away with rest or are not getting better within 5 minutes after you take a dose of nitroglycerin.     · You passed out (lost consciousness).     · You feel like you are having another heart attack.    Call your doctor now or seek immediate medical care if:    · You are having angina symptoms, such as chest pain or pressure, more often than usual, or the symptoms are different or worse than usual.     · You have new or increased shortness of breath.     · You are dizzy or lightheaded, or you feel like you may faint.    Watch closely for changes in your health, and be sure to contact your doctor if you have any problems. Where can you learn more? Go to http://janki-tasia.info/. Enter [id number] in the search box to learn more about \"Heart Attack: Care Instructions. \"  Current as of: July 22, 2018  Content Version: 11.9  © 8549-4493 [employer]. Care instructions adapted under license by [employer] (which disclaims liability or warranty for this information). If you have questions about a medical condition or this instruction, always ask your healthcare professional. [address] any warranty or liability for your use of this information. HOSPITALIST DISCHARGE INSTRUCTIONS    NAME: [place]   :  1935   MRN:  [id number]     Date:     2019    ADMIT DATE: 2019     DISCHARGE DATE: 2019     PRINCIPAL ADMITTING DIAGNOSIS:  Chest pain at rest [R07.9]    DISCHARGE DIAGNOSES:  Principal Problem:    NSTEMI (non-ST elevated myocardial infarction) ([facility] (7/15/2019)    Coronary artery disease of native artery of native heart with stable angina pectoris ([facility] (2019)    Lupus ([facility] (7/15/2019)    Asthma (7/15/2019)    Hypothyroid (7/15/2019)    HTN (hypertension), benign (2019)    MEDICATIONS:    · It is important that medications are taken exactly as they are prescribed on the discharge medication instructions and keep them your  in the bottles provided by the pharmacist.   · Keep a list of the medication names, dosages, and times to be taken at all times. · Do not take other medications without consulting your doctor.      Recommended diet:  Cardiac Diet    Recommended activity: Activity as tolerated    Post discharge care:    Notify follow up health care provider or return to the emergency department if you cannot get hold of your doctor if you feel worse or experience symptoms similar to those that brought you to hospital    Follow-up Information     Follow up With Specialties Details Why Contact Info    Esdras León MD Cardiology On 2019 2 pm   [facility] [address] Dominick Boxer, MD Internal Medicine In 1 week  [place] [phone]            Information obtained by :  I understand that if any problems occur once I am at home I am to contact my physician and I understand and acknowledge receipt of the instructions indicated above.                                                                                                                                            Physician's or R.N.'s Signature                                                                  Date/Time                                                                                                                                              Patient or Representative Signature                                                          Date/Time                                   **** SEE MEDICATION & SIDE EFFECT GUIDE ****

## 2019-07-16 NOTE — PROGRESS NOTES
Cardiology Progress Note                             [address], [address] [facility]                                 Phone [phone]; Fax [phone]        2019 9:38 AM     Admit Date:           2019  Admit Diagnosis:  Chest pain at rest [R07.9]  :          1935   MRN:          [id number]   ASSESSMENT/RECOMMENDATION:   ACS/NSTEMI: s/p VINOD to RCA  - cont ASA/BB/statin  -start plavix 75 mg daily - Rx on chart  -echo shows pEF w mild TR/AR  -Cardiac rehab following  -reviewed post opt wrist restrictions  -crackles on exam LLL- rpt chest Xray  - follow up with Dr Boone Escobar- appt on AVS    Hypertension: controlled- cont ACE/BB  HLD  Hypothyroid / Non-Hodgkin's lymphoma   Anemia: drop in Hgb 10 >8.7- GI consulted- on DAPT now    Addendum :  Chest Xray with effusion and edema  D/c with bumex 0.5 mg daily  She will follow up in the office w Dr Boone Escobar on Friday          Pt personally seen and examined. Chart reviewed. Agree with advanced NP's history, exam and  A/P with changes/additons. R wrist/R Groin - no swelling    Doing well post PCI    DC home today    Discussed with patient/nursing/family    Nel Brunner MD, [facility]        No intake/output data recorded. Last 3 Recorded Weights in this Encounter    07/15/19 1108 07/15/19 1111 19 0450   Weight: 146 lb (66.2 kg) 146 lb (66.2 kg) 143 lb 11.2 oz (65.2 kg)          1901 -  0700  In: 1243.3 [P.O.:680; I.V.:563.3]  Out: -     [address] [facility] denies palpitations, irregular heart beat, SOB, chest pain or LE edema.    No lightheadedness or dizziness          Current Facility-Administered Medications   Medication Dose Route Frequency    pantoprazole (PROTONIX) tablet 40 mg  40 mg Oral ACB&D    albuterol-ipratropium (DUO-NEB) 2.5 MG-0.5 MG/3 ML  3 mL Nebulization Q4H PRN    hydroxychloroquine (PLAQUENIL) tablet 200 mg  200 mg Oral DAILY    lisinopril (PRINIVIL, ZESTRIL) tablet 40 mg  40 mg Oral DAILY    montelukast (SINGULAIR) tablet 10 mg  10 mg Oral DAILY    dextrose 5% - 0.45% NaCl with KCl 20 mEq/L infusion  50 mL/hr IntraVENous CONTINUOUS    sodium chloride (NS) flush 5-40 mL  5-40 mL IntraVENous Q8H    sodium chloride (NS) flush 5-40 mL  5-40 mL IntraVENous PRN    acetaminophen (TYLENOL) tablet 650 mg  650 mg Oral Q4H PRN    HYDROcodone-acetaminophen (NORCO) 5-325 mg per tablet 1 Tab  1 Tab Oral Q6H PRN    naloxone (NARCAN) injection 0.4 mg  0.4 mg IntraVENous PRN    diphenhydrAMINE (BENADRYL) injection 12.5 mg  12.5 mg IntraVENous Q4H PRN    ondansetron (ZOFRAN) injection 4 mg  4 mg IntraVENous Q6H PRN    docusate sodium (COLACE) capsule 100 mg  100 mg Oral BID    morphine injection 1 mg  1 mg IntraVENous Q4H PRN    nitroglycerin (NITROBID) 2 % ointment 0.5 Inch  0.5 Inch Topical BID PRN    budesonide (PULMICORT) 500 mcg/2 ml nebulizer suspension  500 mcg Nebulization BID RT    arformoterol (BROVANA) neb solution 15 mcg  15 mcg Nebulization BID RT    aspirin chewable tablet 81 mg  81 mg Oral DAILY    levothyroxine (SYNTHROID) tablet 125 mcg  125 mcg Oral ACB    metoprolol succinate (TOPROL-XL) XL tablet 50 mg  50 mg Oral DAILY    sodium chloride (NS) flush 5-40 mL  5-40 mL IntraVENous Q8H    sodium chloride (NS) flush 5-40 mL  5-40 mL IntraVENous PRN    clopidogrel (PLAVIX) tablet 75 mg  75 mg Oral DAILY    sodium chloride (NS) flush 5-40 mL  5-40 mL IntraVENous Q8H    sodium chloride (NS) flush 5-40 mL  5-40 mL IntraVENous PRN      OBJECTIVE               Intake/Output Summary (Last 24 hours) at 7/16/2019 0938  Last data filed at 7/16/2019 0626  Gross per 24 hour   Intake 1243.33 ml   Output    Net 1243.33 ml       Review of Systems - History obtained from the patient AS PER  HPI    Telemetry NSR w PACs    PHYSICAL EXAM        Visit Vitals  BP (!) 141/106 (BP 1 Location: Left arm, BP Patient Position: At rest)   Pulse 84   Temp 99.1 °F (37.3 °C)   Resp 24   Ht 5' 2\" (1.575 m)   Wt 143 lb 11.2 oz (65.2 kg)   SpO2 94%   BMI 26.28 kg/m²       Gen: Well-developed, elderly, in no acute distress  alert and oriented x 3  HEENT:  Pink conjunctivae, Hearing grossly normal.No scleral icterus or conjunctival, moist mucous membranes  Neck: Supple,No JVD  Resp: No accessory muscle use, Crackles RLL  Card: Regular Rate,Rythm, No murmurs, rubs or gallop. No thrills. GI:          soft, non-tender   MSK: No cyanosis or clubbing, good capillary refill  Skin: No rashes or ulcers, no bruising.  Right radial site covered w CDI bandage - no redness or drainage /no hematoma  Neuro:  Cranial nerves are grossly intact, moving all four extremities, no focal deficit, follows commands appropriately  Psych:  Good insight, oriented to person, place and time, alert, Nml Affect  LE: No edema       DATA REVIEW            Laboratory and Imaging have been reviewed by me and are notable for  Recent Labs     07/15/19  0807 07/14/19  2308   CPK 51  --    CKMB 2.4  --    TROIQ 0.33* 0.23*     Recent Labs     07/16/19  0435 07/15/19  0807 07/14/19  2308     --  139   K 3.9  --  3.6   CO2 24  --  26   BUN 12  --  14   CREA 0.87  --  0.91   GLU 81  --  108*   PHOS 3.4  --   --    MG 2.0  --   --    WBC 6.5 7.8 9.6   HGB 8.7* 10.1* 10.6*   HCT 27.9* 32.0* 33.8*    249 256             Sonja Head NP

## 2019-07-16 NOTE — PROGRESS NOTES
In preparation for discharge, I have completed AVS med-updates, Care Plans and Education. PCP and Cardiology appointments have been scheduled and added to AVS.as well as post cath care. Patient has been seen by Cardiac Rehab. Case Management has resumed home health. Patient will be discharged home today after echo has been read and chest x-ray completed. Will continue to follow. 1400   Discharge instructions, including information on new medications were reviewed with patient and her daughter. Information on post cath with femoral entry was reviewed (instructions for radial entry were marked out on AVS). All questions were answered. PIV site and monitor were removed. Port was de-accessed per protocol and band aid applied. Home health was set up and patient was discharged home with her family.

## 2019-07-16 NOTE — PROGRESS NOTES
Bedside and Verbal shift change report given to Andrew (oncoming nurse) by Lauren Terry (offgoing nurse). Report included the following information SBAR, Kardex, Intake/Output, MAR, Recent Results and Med Rec Status.

## 2019-07-16 NOTE — PROGRESS NOTES
physical Therapy EVALUATION/DISCHARGE  Patient: Harleen Horowitz (81 y.o. female)  Date: 7/16/2019  Primary Diagnosis: Chest pain at rest [R07.9]  Procedure(s) (LRB):  LEFT HEART CATH / CORONARY ANGIOGRAPHY (N/A)  Angiography Upper Ext Right (N/A)  Percutaneous Coronary Intervention (N/A)  Angioplasty Coronary (N/A)  Insert Stent Jimmy Coronary (N/A) 1 Day Post-Op   Precautions: fall     ASSESSMENT :  Based on the objective data described below, the patient presents with generalized weakness and debility. Patient lives alone single level house daughter lives 8 miles away and comes and check on her many times a day. Patient being discharge back to home today was getting HHPT prior to this admission. Rolled on the edge of bed SBA, supine to sit SBA, sit to stand supervision, ambulate with rolling walker to and from the bathroom supervision. Recommend rollator walker when discharge back to home for safety in and out on the community. Needs frequent rest break during ambulation. Assisted back to bed supine for her x ray. Notified nurse who agreed to continue to monitor patient. patient cleared per Physical Therapy perspective to go home once medically stable. Thank you. Further skilled acute physical therapy is not indicated at this time. PLAN :  Discharge Recommendations: None  Further Equipment Recommendations for Discharge: rollator     SUBJECTIVE:   Patient stated Relda Orn to go home.     OBJECTIVE DATA SUMMARY:   HISTORY:    Past Medical History:   Diagnosis Date    Asthma     Hypertension     Hypothyroid     [facility]     Non Hodgkin's lymphoma ([facility]    No past surgical history on file.   Prior Level of Function/Home Situation: see above notes  Personal factors and/or comorbidities impacting plan of care:     Home Situation  # Steps to Enter: 2  Rails to Enter: Yes  Hand Rails : Bilateral  One/Two Story Residence: One story  Living Alone: Yes  Support Systems: Family member(s)  Patient Expects to be Discharged to[de-identified] Private residence  Current DME Used/Available at Home: [employer] transfer bench  Tub or Shower Type: Tub/Shower combination    EXAMINATION/PRESENTATION/DECISION MAKING:   Critical Behavior:  Neurologic State: Alert  Orientation Level: Oriented X4  Cognition: Follows commands     Hearing: Auditory  Auditory Impairment: None    Range Of Motion:  AROM: Within functional limits           PROM: Within functional limits           Strength:    Strength: Within functional limits                    Tone & Sensation:                                  Coordination:  Coordination: Generally decreased, functional  Vision:      Functional Mobility:  Bed Mobility:  Rolling: Stand-by assistance  Supine to Sit: Stand-by assistance  Sit to Supine: Stand-by assistance  Scooting: Stand-by assistance  Transfers:  Sit to Stand: Supervision  Stand to Sit: Supervision  Stand Pivot Transfers: Supervision     Bed to Chair: Supervision              Balance:   Sitting: Intact  Standing: Intact; With support  Ambulation/Gait Training:  Distance (ft): 50 Feet (ft)  Assistive Device: Walker, rolling;Gait belt  Ambulation - Level of Assistance: Supervision     Gait Description (WDL): Exceptions to WDL  Gait Abnormalities: Path deviations              Speed/Demetria: Pace decreased (<100 feet/min)                           Therapeutic Exercises:    Instructed patient to continue active range of motion exercise on both legs while up on chair or on bed. Functional Measure:  Barthel Index:    Bathin  Bladder: 10  Bowels: 10  Groomin  Dressing: 10  Feeding: 10  Mobility: 15  Stairs: 0  Toilet Use: 10  Transfer (Bed to Chair and Back): 15  Total: 90/100       Percentage of impairment   0%   1-19%   20-39%   40-59%   60-79%   80-99%   100%   Barthel Score 0-100 100 99-80 79-60 59-40 20-39 1-19   0     The Barthel ADL Index: Guidelines  1.  The index should be used as a record of what a patient does, not as a record of what a patient could do. 2. The main aim is to establish degree of independence from any help, physical or verbal, however minor and for whatever reason. 3. The need for supervision renders the patient not independent. 4. A patient's performance should be established using the best available evidence. Asking the patient, friends/relatives and nurses are the usual sources, but direct observation and common sense are also important. However direct testing is not needed. 5. Usually the patient's performance over the preceding 24-48 hours is important, but occasionally longer periods will be relevant. 6. Middle categories imply that the patient supplies over 50 per cent of the effort. 7. Use of aids to be independent is allowed. Sandra Louis., Barthel, DCiraW. (8380). Functional evaluation: the Barthel Index. [facility] (14)2. [facility] FATEMEH Doyle, Governor Luz Marinading., Keerthi Nate., [facility], [address] [place] [address] (1999). Measuring the change indisability after inpatient rehabilitation; comparison of the responsiveness of the Barthel Index and Functional [place] Measure. Journal of Neurology, Neurosurgery, and Psychiatry, 66(4), 462-214. Aditi Neves, N.J.A, SRINI Nichole.ANTHONY, & Nino Denny, M.A. (2004.) Assessment of post-stroke quality of life in cost-effectiveness studies: The usefulness of the Barthel Index and the EuroQoL-5D.  Quality of Life Research, 15, 300-24          Physical Therapy Evaluation Charge Determination   History Examination Presentation Decision-Making   LOW Complexity : Zero comorbidities / personal factors that will impact the outcome / POC LOW Complexity : 1-2 Standardized tests and measures addressing body structure, function, activity limitation and / or participation in recreation  LOW Complexity : Stable, uncomplicated  Other outcome measures barthel  LOW       Based on the above components, the patient evaluation is determined to be of the following complexity level: LOW     Pain:  Pain Scale 1: Numeric (0 - 10)  Pain Intensity 1: 0  Pain Location 1: Wrist  Activity Tolerance:   Good. Please refer to the flowsheet for vital signs taken during this treatment. After treatment:   [x]   Patient left in no apparent distress sitting up in chair  [x]   Patient left in no apparent distress in bed  [x]   Call bell left within reach  [x]   Nursing notified  [x]   Caregiver present  []   Bed alarm activated    COMMUNICATION/EDUCATION:   Communication/Collaboration:  [x]   Fall prevention education was provided and the patient/caregiver indicated understanding. [x]   Patient/family have participated as able and agree with findings and recommendations. []   Patient is unable to participate in plan of care at this time. Findings and recommendations were discussed with: Occupational Therapist, Registered Nurse, Physician,  and patient    Thank you for this referral.  Logan Terrazas PT,WCC.    Time Calculation: 27 mins

## 2019-07-16 NOTE — PROGRESS NOTES
Transitional Care Team: Initial HUG Note    Date of Assessment: 07/16/19  Time of Assessment:  11:32 AM    Samantha Parr is a 81 y.o. female inpatient at [facility]. Assessment & Plan   -patient lives alone. Has family nearby for assistance as needed.    -will resume [facility]. Offered information by cardiac rehab nurse, closest site to her home is [place] (45 minutes away). Will consider after completing HH. New medication- Plavix and bumex       Primary Diagnosis: NSTEMI s/p PTCA with stent to RCA on 7/15. Met with patient and daughter just prior to discharge. Patient is waiting for CXR result prior to living. Patient denies any concerns related to discharging home today. Daughter will contact [facility] to discuss resumption of care visit, she understands that  has sent them orders and updates. Advance Directive:  not on file. Current Code Status:  Full Code    Discharge Needs: (to include safety issues) patient lives alone with family nearby. She will resume  with [facility]. She lives alone, but has resources to hire assistance at home if needed. Barriers Identified: lives alone. Daughter is present in room and will help with transportation to follow up appointments. Medication Review:  was performed. Can patient afford medications:  yes. Who manages medications at home: patient    HUG (Healthy Understanding of Goals) program introduced to patient/family. The Transitional Care Team bridges the gaps in care and education surrounding discharge from the acute care facility. The objective is to empower the patient and family in taking a proactive role in preventing readmission within the first thirty days after discharge. The team is also involved in the efforts to reduce readmission to the acute care setting after stabilization and discharge from the acute care environment either to skilled nursing facilities or community.       Follow-Ups 1 Follow up with Hector Mahmood MD (Cardiology) on 8/1/2019; 2 pm   2 Go to Bernardo Frost MD (Internal Medicine) on 7/29/2019; For hospital follow up appointment at 10:15AM   3 Follow up with [facility]; THEY WILL CONTACT YOU TO SCHEDULE A HOME VISIT   4 Follow up with [facility]; THEY SUPPLIED YOUR NEBULIZER MACHINE   5 Follow up with Dereje Dias MD (Pulmonary Disease) on 7/23/2019; 230pm   6 Follow up with Hector Mahmood MD (Cardiology) on 7/19/2019; 140 pm    [facility]: patient is outside of coverage area. Patient education focused on readmission zones as described as: The Red Zone: High risk for readmission, days 1-21  The Yellow Zone:  Moderate  risk for readmission, days 22-29   The Green Zone: Lower risk for readmission, days                30 and after    Past Medical History:   Diagnosis Date    Asthma     Hypertension     Hypothyroid     [facility]     Non Hodgkin's lymphoma ([facility]

## 2019-07-16 NOTE — CARDIO/PULMONARY
Cardiac Rehab: 80 yo female admitted with Chest Pain & SOB (7/14). Per Dr Mer Dye, dx includes: NSTEMI. S/P PTCA with VINOD to RCA (7/15). LVEF pending. Core Measures: ACE/ARB - lisinopril BB - metoprolol succinate Statin - atorvastatin ASA - 81 mg New PO Cardiac Medications: Plavix. Smoking History:  Never smoked. 7/16/2019 Met with Charls Boeck on [facility]. Daughter Rashawn Lopez was also present. Dgt reported pt resides alone in [address] [place] [address] ([facility]). Pt has been receiving home PT for strengthening since June admission at [facility]. She had been driving up until June. Discussed pt's understanding of her cardiac condition and PCI results. Pt was unclear on whether she had a heart attack. Aware she needed a stent. Provided MI education folder with stent card. CAD risk factors include: HTN, HLD, family hx, and post-menopausal. Reviewed purpose/potential side effects of Plavix. Emphasized importance of med compliance, especially with Plavix to reduce risk of occluding stent. Discussed eligibility for outpatient cardiac rehab after home health services are completed. Closest site is in [place], about 45 min from pt's home. Encouraged pt to discuss cardiac rehab further with her cardiologist, if she decides to participate in the program.  
 
Pt verbalized basic understanding of info provided. Dgt verbalized more thorough understanding. All questions were answered.

## 2019-07-16 NOTE — PROGRESS NOTES
[facility] Dosing Services: IV to PO Conversion    The pharmacist has determined that this patient meets P & T approved criteria for conversion from IV to oral therapy for the following medication:Pantoprazole    The pharmacist has written the following order for the patient: pantoprazole 40mg PO BID   The pharmacist will continue to monitor the patient's status and advise the physician if conversion back to IV therapy is recommended.     Signed Tara Black Contact information:  [phone]

## 2019-07-16 NOTE — PROGRESS NOTES
7- CASE MANAGEMENT NOTE:  Per the consult and with the patient's and daughter's consent, I sent a referral for a rollator thru [employer] to [facility]. This was approved and the patient's son-in-law will pick it up at [facility] today between 2:00-3:00 PM today. The patient and family understand there is a $50 co-pay and that is must be paid when picked up. No further discharge needs anticipated.  JONA Scott, CM

## 2019-07-16 NOTE — CONSULTS
Gastroenterology Consultation Note      Admit Date: 7/14/2019  Consult Date: 7/16/2019   I greatly appreciate your asking me to see Torie Hodgson, thank you very much for the opportunity to participate in her care. Narrative Assessment and Plan   · NSTEMI - s/p VINOD placement and on DAPT  · Hiatal hernia  · Chest pain - resolved  · Anemia - Hgb 8.7 this AM, no overt signs of GI blood loss    Seems that patient chest pain was secondary to NSTEMI as it has resolved with cardiac intervention. Continue with reflux precautions and daily PPI - patient reports that symptoms have been well controlled with this regimen  No plans for endoscopic intervention  ---------------  Shahab Yadkin. Stanley Loen MD  [phone] office  [phone] voicemail   I have personally reviewed the history, reviewed the chart and agree with the documentation recorded by the Mid Level Provider, including the assessment, treatment plan, and disposition; with the addition of:    Her hiatal hernia is a longstanding problem that is not contributing to her coronary / cardiac diagnoses. I have no recommendations for endoscopic or surgical intervention for the hiatal hernia as it is not indicated and she just had MI and VINOD placement. GI is signing off. Elliott Pompa MD        Subjective:     Chief Complaint: none at this time  Reason for consult: hiatal hernia    History of Present Illness: Torie Hodgson is a 81 y.o. female with PMH of asthma, HTN, lupus, Non Hodgkin's lymphoma and known hiatal hernia presented to ED with complaints of chest pain and SOB. Per patient it has been intermittent over several weeks but then progressively worsened which prompted visit to ED. Workup revealed NSTEM and she had cardiac cath yesterday. She is now s/p VINOD placement and on DAPT. This AM she does not have any complaints. Denies CP, SOB, abdominal pain, nausea or vomiting. Tolerating diet. CT scan revealed hiatal hernia.  Per patient she has known about her hiatal hernia for years and never any plans for repair. She takes omeprazole once daily and states that reflux symptoms are well controlled with this regimen. PCP:  Griselda Dove MD    Past Medical History:   Diagnosis Date    Asthma     Hypertension     Hypothyroid     [facility]     Non Hodgkin's lymphoma ([facility]         No past surgical history on file. Social History     Tobacco Use    Smoking status: Not on file   Substance Use Topics    Alcohol use: Not on file        Family History   Problem Relation Age of Onset    Heart Disease Mother         Allergies   Allergen Reactions    Latex Rash            Home Medications:  Prior to Admission Medications   Prescriptions Last Dose Informant Patient Reported? Taking? Calcium-Cholecalciferol, D3, (CALCIUM 600 WITH VITAMIN D3) 600 mg(1,500mg) -400 unit cap 7/14/2019 Self Yes Yes   Sig: Take 1 Tab by mouth two (2) times a day. albuterol (PROVENTIL HFA, VENTOLIN HFA, PROAIR HFA) 90 mcg/actuation inhaler 7/14/2019 Self Yes Yes   Sig: Take 2 Puffs by inhalation every six (6) hours as needed for Wheezing. alendronate (FOSAMAX) 70 mg tablet 7/14/2019 Self Yes Yes   Sig: Take 70 mg by mouth every seven (7) days. aspirin 81 mg chewable tablet 7/14/2019 Self Yes Yes   Sig: Take 81 mg by mouth daily. atorvastatin (LIPITOR) 20 mg tablet 7/14/2019 Self Yes Yes   Sig: Take 20 mg by mouth daily. budesonide-formoterol (SYMBICORT) 160-4.5 mcg/actuation HFAA 7/14/2019 Self Yes Yes   Sig: Take 2 Puffs by inhalation two (2) times a day. folic acid (FOLVITE) 1 mg tablet 7/14/2019 Self Yes Yes   Sig: Take 1 mg by mouth daily. hydroxychloroquine (PLAQUENIL) 200 mg tablet 7/14/2019 Self Yes Yes   Sig: Take 200 mg by mouth daily. levothyroxine (SYNTHROID) 125 mcg tablet 7/14/2019 Self Yes Yes   Sig: Take 125 mcg by mouth Daily (before breakfast).    lisinopril (PRINIVIL, ZESTRIL) 40 mg tablet 7/14/2019 Self Yes Yes   Sig: Take 40 mg by mouth daily. metoprolol succinate (TOPROL-XL) 50 mg XL tablet 7/14/2019 Self Yes Yes   Sig: Take 50 mg by mouth daily. montelukast (SINGULAIR) 10 mg tablet 7/14/2019 Self Yes Yes   Sig: Take 10 mg by mouth daily. multivitamin (ONE A DAY) tablet 7/14/2019 Self Yes Yes   Sig: Take 1 Tab by mouth daily. omeprazole (PRILOSEC) 20 mg capsule 7/14/2019 Self Yes Yes   Sig: Take 20 mg by mouth daily.       Facility-Administered Medications: None       Hospital Medications:  Current Facility-Administered Medications   Medication Dose Route Frequency    pantoprazole (PROTONIX) tablet 40 mg  40 mg Oral ACB&D    albuterol-ipratropium (DUO-NEB) 2.5 MG-0.5 MG/3 ML  3 mL Nebulization Q4H PRN    hydroxychloroquine (PLAQUENIL) tablet 200 mg  200 mg Oral DAILY    lisinopril (PRINIVIL, ZESTRIL) tablet 40 mg  40 mg Oral DAILY    montelukast (SINGULAIR) tablet 10 mg  10 mg Oral DAILY    dextrose 5% - 0.45% NaCl with KCl 20 mEq/L infusion  50 mL/hr IntraVENous CONTINUOUS    sodium chloride (NS) flush 5-40 mL  5-40 mL IntraVENous Q8H    sodium chloride (NS) flush 5-40 mL  5-40 mL IntraVENous PRN    acetaminophen (TYLENOL) tablet 650 mg  650 mg Oral Q4H PRN    HYDROcodone-acetaminophen (NORCO) 5-325 mg per tablet 1 Tab  1 Tab Oral Q6H PRN    naloxone (NARCAN) injection 0.4 mg  0.4 mg IntraVENous PRN    diphenhydrAMINE (BENADRYL) injection 12.5 mg  12.5 mg IntraVENous Q4H PRN    ondansetron (ZOFRAN) injection 4 mg  4 mg IntraVENous Q6H PRN    docusate sodium (COLACE) capsule 100 mg  100 mg Oral BID    morphine injection 1 mg  1 mg IntraVENous Q4H PRN    nitroglycerin (NITROBID) 2 % ointment 0.5 Inch  0.5 Inch Topical BID PRN    budesonide (PULMICORT) 500 mcg/2 ml nebulizer suspension  500 mcg Nebulization BID RT    arformoterol (BROVANA) neb solution 15 mcg  15 mcg Nebulization BID RT    aspirin chewable tablet 81 mg  81 mg Oral DAILY    levothyroxine (SYNTHROID) tablet 125 mcg  125 mcg Oral ACB    metoprolol succinate (TOPROL-XL) XL tablet 50 mg  50 mg Oral DAILY    sodium chloride (NS) flush 5-40 mL  5-40 mL IntraVENous Q8H    sodium chloride (NS) flush 5-40 mL  5-40 mL IntraVENous PRN    clopidogrel (PLAVIX) tablet 75 mg  75 mg Oral DAILY    sodium chloride (NS) flush 5-40 mL  5-40 mL IntraVENous Q8H    sodium chloride (NS) flush 5-40 mL  5-40 mL IntraVENous PRN       Review of Systems: Admission ROS by Vaughn Vo MD from 7/14/2019 were reviewed with the patient and changes (other than per HPI) include: none      Objective:     Physical Exam:  Visit Vitals  BP (!) 141/106 (BP 1 Location: Left arm, BP Patient Position: At rest)   Pulse 84   Temp 99.1 °F (37.3 °C)   Resp 24   Ht 5' 2\" (1.575 m)   Wt 65.2 kg (143 lb 11.2 oz)   SpO2 94%   BMI 26.28 kg/m²     SpO2 Readings from Last 6 Encounters:   07/16/19 94%            Intake/Output Summary (Last 24 hours) at 7/16/2019 0941  Last data filed at 7/16/2019 3939  Gross per 24 hour   Intake 1243.33 ml   Output    Net 1243.33 ml      General: no distress, comfortable  Skin:  No rash or palpable dermatologic mass lesions  HEENT: Pupils equal, sclera anicteric, oropharynx with no gross lesions  Cardiovascular: No abnormal audible heart sounds, well perfused, no edema  Respiratory:  Normal respiratory effort   GI:  Bowel sounds active, soft, nondistended, nontender  Musculoskeletal:  No skeletal deformity nor acute arthritis noted.   Neurological:  CN II-XII grossly intact, no focal deficits  Psychiatric:  Normal affect, memory intact, appears to have insight into current illness    Laboratory:    Recent Results (from the past 24 hour(s))   ECHO ADULT COMPLETE    Collection Time: 07/15/19 11:11 AM   Result Value Ref Range    LA Volume 85.2 22 - 52 mL    LV E' Lateral Velocity 8.80 centimeter/second    LV E' Septal Velocity 7.49 centimeter/second    Tapse 1.77 1.5 - 2.0 cm    Ao Root D 2.71 cm    LA Vol Index 50.95 16 - 28 ml/m2    Aortic Valve Systolic Peak Velocity 718.33 cm/s    Aortic Valve Area by Continuity of Peak Velocity 1.3 cm2    AoV PG 9.3 mmHg    LVIDd 5.26 3.9 - 5.3 cm    LVPWd 0.82 0.6 - 0.9 cm    LVIDs 3.78 cm    IVSd 0.77 0.6 - 0.9 cm    LVOT d 1.86 cm    LVOT Peak Velocity 70.98 cm/s    LVOT Peak Gradient 2.0 mmHg    MVA (PHT) 4.9 cm2    MV A Paul 63.48 cm/s    MV E Paul 96.60 cm/s    MV E/A 1.52     Left Atrium to Aortic Root Ratio 1.81     RVIDd 3.31 cm    LA Vol 4C 73.03 (A) 22 - 52 mL    LA Vol 2C 97.67 (A) 22 - 52 mL    LA Area 4C 22.7 cm2    LV Mass .1 (A) 67 - 162 g    LV Mass AL Index 100.5 43 - 95 g/m2    RVSP 49.7 mmHg    Est. RA Pressure 3.0 mmHg    Mitral Regurgitant Peak Velocity 496.41 cm/s    Mitral Valve E Wave Deceleration Time 153.5 ms    Mitral Valve Pressure Half-time 44.5 ms    Left Atrium Major Axis 4.91 cm    Triscuspid Valve Regurgitation Peak Gradient 46.7 mmHg    Aortic Regurgitant Pressure Half-time 559.2 cm    Pulmonic Valve Max Velocity 64.89 cm/s    TR Max Velocity 341.69 cm/s    PASP 49.7 mmHg    LA Vol Index 58.41 16 - 28 ml/m2    LA Vol Index 43.67 16 - 28 ml/m2    MR Peak Gradient 98.6 mmHg    AR Max Paul 475.85 cm/s    PV peak gradient 1.7 mmHg   POC ACTIVATED CLOTTING TIME    Collection Time: 07/15/19 12:47 PM   Result Value Ref Range    Activated Clotting Time (POC) 169 (H) 79 - 138 SECS   POC ACTIVATED CLOTTING TIME    Collection Time: 07/15/19 12:58 PM   Result Value Ref Range    Activated Clotting Time (POC) 307 (H) 79 - 138 SECS   POC ACTIVATED CLOTTING TIME    Collection Time: 07/15/19  1:08 PM   Result Value Ref Range    Activated Clotting Time (POC) 202 (H) 79 - 138 SECS   EKG, 12 LEAD, INITIAL    Collection Time: 07/15/19  1:36 PM   Result Value Ref Range    Ventricular Rate 76 BPM    Atrial Rate 76 BPM    P-R Interval 144 ms    QRS Duration 106 ms    Q-T Interval 410 ms    QTC Calculation (Bezet) 461 ms    Calculated P Axis 60 degrees    Calculated R Axis -29 degrees    Calculated T Axis 106 degrees    Diagnosis Normal sinus rhythm  Incomplete left bundle branch block  T wave abnormality, consider lateral ischemia  Abnormal ECG  When compared with ECG of 14-JUL-2019 22:58,  No significant change was found  Confirmed by Jem Farmer M.D., Rossana Loya ([id number]) on 7/15/2019 0:82:13 PM     METABOLIC PANEL, BASIC    Collection Time: 07/16/19  4:35 AM   Result Value Ref Range    Sodium 142 136 - 145 mmol/L    Potassium 3.9 3.5 - 5.1 mmol/L    Chloride 111 (H) 97 - 108 mmol/L    CO2 24 21 - 32 mmol/L    Anion gap 7 5 - 15 mmol/L    Glucose 81 65 - 100 mg/dL    BUN 12 6 - 20 MG/DL    Creatinine 0.87 0.55 - 1.02 MG/DL    BUN/Creatinine ratio 14 12 - 20      GFR est AA >60 >60 ml/min/1.73m2    GFR est non-AA >60 >60 ml/min/1.73m2    Calcium 8.0 (L) 8.5 - 10.1 MG/DL   LIPID PANEL    Collection Time: 07/16/19  4:35 AM   Result Value Ref Range    LIPID PROFILE          Cholesterol, total 91 <200 MG/DL    Triglyceride 95 <150 MG/DL    HDL Cholesterol 31 MG/DL    LDL, calculated 41 0 - 100 MG/DL    VLDL, calculated 19 MG/DL    CHOL/HDL Ratio 2.9 0.0 - 5.0     CBC W/O DIFF    Collection Time: 07/16/19  4:35 AM   Result Value Ref Range    WBC 6.5 3.6 - 11.0 K/uL    RBC 3.66 (L) 3.80 - 5.20 M/uL    HGB 8.7 (L) 11.5 - 16.0 g/dL    HCT 27.9 (L) 35.0 - 47.0 %    MCV 76.2 (L) 80.0 - 99.0 FL    MCH 23.8 (L) 26.0 - 34.0 PG    MCHC 31.2 30.0 - 36.5 g/dL    RDW 16.2 (H) 11.5 - 14.5 %    PLATELET 704 750 - 992 K/uL    MPV 11.3 8.9 - 12.9 FL    NRBC 0.0 0  WBC    ABSOLUTE NRBC 0.00 0.00 - 0.01 K/uL   MAGNESIUM    Collection Time: 07/16/19  4:35 AM   Result Value Ref Range    Magnesium 2.0 1.6 - 2.4 mg/dL   PHOSPHORUS    Collection Time: 07/16/19  4:35 AM   Result Value Ref Range    Phosphorus 3.4 2.6 - 4.7 MG/DL   HEPATIC FUNCTION PANEL    Collection Time: 07/16/19  4:35 AM   Result Value Ref Range    Protein, total 5.4 (L) 6.4 - 8.2 g/dL    Albumin 2.4 (L) 3.5 - 5.0 g/dL    Globulin 3.0 2.0 - 4.0 g/dL    A-G Ratio 0.8 (L) 1.1 - 2.2      Bilirubin, total 0.3 0.2 - 1.0 MG/DL    Bilirubin, direct 0.1 0.0 - 0.2 MG/DL    Alk. phosphatase 60 45 - 117 U/L    AST (SGOT) 13 (L) 15 - 37 U/L    ALT (SGPT) 15 12 - 78 U/L   HEMOGLOBIN A1C WITH EAG    Collection Time: 07/16/19  4:35 AM   Result Value Ref Range    Hemoglobin A1c 6.3 4.2 - 6.3 %    Est. average glucose 134 mg/dL         Assessment/Plan:     Principal Problem:    [facility]non[facility] elevated [facility] (7/15/2019)    Active Problems:    Lupus ([facility] (7/15/2019)      Asthma (7/15/2019)      Hypothyroid (7/15/2019)      Coronary artery disease of native artery of native heart with stable angina pectoris ([facility] (7/16/2019)      HTN (hypertension), benign (7/16/2019)         See above narrative for full detail.     Rena De La Garza PA-C  07/16/19

## 2019-07-16 NOTE — PROGRESS NOTES
7- CASE MANAGEMENT NOTE:   I met with the patient and her daughter, Ish Atowod ([phone]), to discuss the consults for a nebulizer and resumption of home health RN/PT services. Choice letters were signed for [facility] and [facility] and placed on her chart. I sent the nebulizer order to [facility] thru [employer] and got the machine from the DME closet. I faxed the home health resumption order to [facility] and they have accepted. The daughter will transport the patient home today.  Shayne Betancourt, JESSIW, CM

## 2019-07-16 NOTE — PROGRESS NOTES
Occupational Therapy EVALUATION/discharge  Patient: Kurt Dutta (81 y.o. female)  Date: 7/16/2019  Primary Diagnosis: Chest pain at rest [R07.9]  Procedure(s) (LRB):  LEFT HEART CATH / CORONARY ANGIOGRAPHY (N/A)  Angiography Upper Ext Right (N/A)  Percutaneous Coronary Intervention (N/A)  Angioplasty Coronary (N/A)  Insert Stent Jimmy Coronary (N/A) 1 Day Post-Op   Precautions:       ASSESSMENT:   Based on the objective data described below, the patient presents near functional independent baseline with SBA-supervision level ADLs, transfers, and functional mobility following admission for chest pain and left heart cath. She demonstrated good safety awareness and no LOB. PTA she was independent in ADLs and IADLs and lives alone. Her daughter lives 8 miles away and visits often. Pt reports fatigue with everyday activities. Pt and daughter educated and verbalized understanding energy conservation techniques. Handout provided. Further skilled acute occupational therapy is not indicated at this time. Discharge Recommendations: None for OT  Further Equipment Recommendations for Discharge: rollator      SUBJECTIVE:   Patient stated I get tired when I take a shower.     OBJECTIVE DATA SUMMARY:   HISTORY:   Past Medical History:   Diagnosis Date    Asthma     Hypertension     Hypothyroid     Lupus ([facility]     Non Hodgkin's lymphoma ([facility]    No past surgical history on file. Prior Level of Function/Environment/Context: independent in ADLs and IADLs.  Lives alone with daughter 8 miles away and checks on her often  Occupations in which the patient is/was successful, what are the barriers preventing that success: gets fatigued easily which makes ADLs difficult, often gets tired when showering  Performance Patterns (routines, roles, habits, and rituals): does \"everything\" around the house  Personal Interests and/or values: Likes walking her dog June and reading [address] [place] [address]: Private residence  # Steps to Enter: 2  Rails to Enter: Yes  Hand Rails : Bilateral  One/Two Story Residence: One story  Living Alone: Yes  Support Systems: Family member(s)  Patient Expects to be Discharged to[de-identified] Private residence  Current DME Used/Available at Home: Clearance Javon transfer bench  Tub or Shower Type: Tub/Shower combination    Hand dominance: Right    EXAMINATION OF PERFORMANCE DEFICITS:  Cognitive/Behavioral Status:  Neurologic State: Alert  Orientation Level: Oriented X4  Cognition: Appropriate decision making; Appropriate for age attention/concentration; Appropriate safety awareness; Follows commands  Perception: Appears intact  Perseveration: No perseveration noted  Safety/Judgement: Awareness of environment; Fall prevention    Hearing: Auditory  Auditory Impairment: None    Vision/Perceptual:    Acuity: Within Defined Limits      Range of Motion:  AROM: Within functional limits  PROM: Within functional limits    Strength:  Strength: Within functional limits    Coordination:  Coordination: Generally decreased, functional  Fine Motor Skills-Upper: Left Intact; Right Intact    Gross Motor Skills-Upper: Left Intact; Right Intact    Tone & Sensation:  Tone: Normal  Sensation: Intact    Balance:  Sitting: Intact  Standing: Intact; With support    Functional Mobility and Transfers for ADLs:  Bed Mobility:  Rolling: Stand-by assistance  Supine to Sit: Stand-by assistance  Sit to Supine: Stand-by assistance  Scooting: Stand-by assistance    Transfers:  Sit to Stand: Supervision  Stand to Sit: Supervision  Bed to Chair: Supervision  Bathroom Mobility: Stand-by assistance  Toilet Transfer : Stand-by assistance  Assistive Device : Walker, rolling    ADL Assessment:  Feeding: Independent    Oral Facial Hygiene/Grooming: Stand-by assistance(standing at sink)    Bathing: Stand-by assistance    Upper Body Dressing: Stand-by assistance    Lower Body Dressing: Stand-by assistance    Toileting: Stand by assistance    ADL Intervention and task modifications:  Educated patient on energy conservation and strategies to maximize her quality of life. 1. Deep breathing  2. Educated on pacing and making sure he/she takes short frequent breaks (e.g. In the shower wash the upper body, rest for 1 minute, then wash the lower body, etc)  5. Educated on re-arranging his/her routine to allow for rest breaks in the morning routine  8. Educated on looking at the consequences of his/her actions before deciding he/she needs to take on a task (e.g not getting down on one's hands and knees to wash floors because it will take all of one's energy for the day and result in exhaustion). 8. Educated on DME used to help conserve energy, such as a shower seat, a stool or chair in the kitchen, and pushing or pulling items instead of carrying them  Cognitive Retraining  Safety/Judgement: Awareness of environment; Fall prevention      Functional Measure:  Barthel Index:    Bathin  Bladder: 10  Bowels: 10  Groomin  Dressing: 10  Feeding: 10  Mobility: 15  Stairs: 0  Toilet Use: 10  Transfer (Bed to Chair and Back): 15  Total: 90/100        Percentage of impairment   0%   1-19%   20-39%   40-59%   60-79%   80-99%   100%   Barthel Score 0-100 100 99-80 79-60 59-40 20-39 1-19   0     The Barthel ADL Index: Guidelines  1. The index should be used as a record of what a patient does, not as a record of what a patient could do. 2. The main aim is to establish degree of independence from any help, physical or verbal, however minor and for whatever reason. 3. The need for supervision renders the patient not independent. 4. A patient's performance should be established using the best available evidence. Asking the patient, friends/relatives and nurses are the usual sources, but direct observation and common sense are also important. However direct testing is not needed.   5. Usually the patient's performance over the preceding 24-48 hours is important, but occasionally longer periods will be relevant. 6. Middle categories imply that the patient supplies over 50 per cent of the effort. 7. Use of aids to be independent is allowed. Rodolfo Angel., Barthel, D.W. ([id number]). Functional evaluation: the Barthel Index. [facility] (14)2. FATEMEH Hutchison, Leila Pérez., Florentino Oden., [place], [address] (1999). Measuring the change indisability after inpatient rehabilitation; comparison of the responsiveness of the Barthel Index and Functional Addison Measure. Journal of Neurology, Neurosurgery, and Psychiatry, 66(4), 187-551. Ethel Hinojosa, N.J.A, BJ Nichole, & Meme Justice MCiraA. (2004.) Assessment of post-stroke quality of life in cost-effectiveness studies: The usefulness of the Barthel Index and the EuroQoL-5D. Quality of Life Research, 15, 493-74       Occupational Therapy Evaluation Charge Determination   History Examination Decision-Making   LOW Complexity : Brief history review  LOW Complexity : 1-3 performance deficits relating to physical, cognitive , or psychosocial skils that result in activity limitations and / or participation restrictions  LOW Complexity : No comorbidities that affect functional and no verbal or physical assistance needed to complete eval tasks       Based on the above components, the patient evaluation is determined to be of the following complexity level: LOW   Pain:  Pain Scale 1: Numeric (0 - 10)  Pain Intensity 1: 0  Pain Location 1: Wrist  Pain Orientation 1: Right  Pain Description 1: Sore  Pain Intervention(s) 1: Emotional support  Activity Tolerance:   Good  Please refer to the flowsheet for vital signs taken during this treatment.   After treatment:   []  Patient left in no apparent distress sitting up in chair  [x]  Patient left in no apparent distress in bed  [x]  Call bell left within reach   [x]  Nursing notified  [x]  Caregiver present- daughter  []  Bed alarm activated    COMMUNICATION/EDUCATION: Communication/Collaboration:  [x]      Home safety education was provided and the patient/caregiver indicated understanding. [x]      Patient/family have participated as able and agree with findings and recommendations. []      Patient is unable to participate in plan of care at this time. Findings and recommendations were discussed with: Physical Therapist and Registered Nurse     Regarding student involvement in patient care:  A student participated in this treatment session. Per CMS Medicare statements and AOTA guidelines I certify that the following was true:  1. I was present and directly observed the entire session. 2. I made all skilled judgments and clinical decisions regarding care. 3. I am the practitioner responsible for assessment, treatment, and documentation.       Walt Tomlinson  Time Calculation: 17 mins

## 2019-07-16 NOTE — PROGRESS NOTES
Bedside and Verbal shift change report given to Rian Vasquez (oncoming nurse) by Seferino Gotti (offgoing nurse). Report included the following information SBAR, Kardex, Accordion and Recent Results. 7/16/19  0730  Bedside and Verbal shift change report given to Andrew (oncoming nurse) by Rian Vasquez (offgoing nurse). Report included the following information SBAR, Kardex, Accordion and Recent Results.

## 2019-07-19 ENCOUNTER — OFFICE VISIT (OUTPATIENT)
Dept: CARDIOLOGY CLINIC | Age: 84
End: 2019-07-19

## 2019-07-19 VITALS
RESPIRATION RATE: 16 BRPM | WEIGHT: 141 LBS | OXYGEN SATURATION: 98 % | BODY MASS INDEX: 25.95 KG/M2 | SYSTOLIC BLOOD PRESSURE: 130 MMHG | HEART RATE: 68 BPM | DIASTOLIC BLOOD PRESSURE: 66 MMHG | HEIGHT: 62 IN

## 2019-07-19 DIAGNOSIS — I25.10 CORONARY ARTERY DISEASE INVOLVING NATIVE CORONARY ARTERY OF NATIVE HEART WITHOUT ANGINA PECTORIS: Primary | ICD-10-CM

## 2019-07-19 RX ORDER — ACETAMINOPHEN 325 MG/1
TABLET ORAL
COMMUNITY

## 2019-07-19 RX ORDER — ALBUTEROL SULFATE 2.5 MG/.5ML
SOLUTION RESPIRATORY (INHALATION) AS NEEDED
COMMUNITY

## 2019-07-19 NOTE — LETTER
7/19/19 Patient: Jay Jay Maynard YOB: 1935 Date of Visit: 7/19/2019 Cody Carl MD 
[address] [address] [address] VIA Facsimile: [phone] Dear Cody Carl MD, Thank you for referring Ms. Jay Jay Maynard to [facility] for evaluation. My notes for this consultation are attached. If you have questions, please do not hesitate to call me. I look forward to following your patient along with you. Sincerely, Dorcas Salazar MD

## 2019-07-19 NOTE — PROGRESS NOTES
Surendra Fortune MD    [address] [place], [address] [facility]    Office [phone],DCA [phone]  Pager [phone]    Betsy Barron is a 81 y.o. female is here for f/u visit . Primary care physician:  Zackary De León MD    Patient Active Problem List   Diagnosis Code    NSTEMI (non-ST elevated myocardial infarction) ([facility] I21.4    Lupus ([facility] [address] [facility] M32.9    Asthma J45.909    Hypothyroid E03.9    Coronary artery disease of native artery of native heart with stable angina pectoris ([facility] [address] [facility] I25.118    HTN (hypertension), benign I10       Dear Dr. Lalo Pina,    I had the pleasure of seeing Ms. Betsy Barron in the office today. Chief complaint:  Chief Complaint   Patient presents with   [facility] Follow Up     Denies chest pain/shortness of breath/dizziness. Occasional swelling. Assessment:    CAD - NSTEMI(7/15/19 )- PCI to RCA with VINOD  HTN  HLD  Hx of Hypothyroid/SLE/Non Hodgkin's Lymphoma in remission ( last treatment 2013)      Plan:     Cont meds  Getting home PT  Cont meds on Bumex 0.5 mg daily. Will check BMP today. She will continue the medication until she comes back for the next visit. We will recheck BMP at that point. F/u 1 month or earlier as needed      Patient understands the plan. All questions were answered to the patient's satisfaction. Medication Side Effects and Warnings were discussed with patient: yes  Patient Labs were reviewed and or requested:  yes  Patient Past Records were reviewed and or requested: yes    I appreciate the opportunity to be involved in [address] [place] [address] Dr. See note below for details. Please do not hesitate to contact us with questions or concerns. Surendra Fortune MD    Cardiac Testing/ Procedures: A. Cardiac Cath/PCI:R Radial access  RCA - JR4  LCA - difficulty to access with TIG4;EBU 3.5     R CFA - micropuncture/ultrasound/fluorscopy guided access     L Main: Ca++; Ostial LM 10% ( good reflux/no dampening)     LAD: Med; MLI; D1 - MLI     LCflex: Med; MLI     RCA: Dominant  Prox 95%; Mid 50%; PDA and PLB - small; PLB branch diffuse severe dz        LVEDP:  15     LVEF: Not assessed; Nml by echo     No significant gradient across aortic valve.     PCI: JR4 guide ( with SH)  BMW wire  Pre dil with 2 by 12  VINOD 2.5 by 26  Post dil with 2.75 by 12 ( at high merary - approx 3mm)  GERALD 3 before and after           Specimens Removed : None     Closure Device: TR Band - R radial     R CFA - mynx          B. ECHO/AUNDREA: 7//15/19 - EF 56-60%; Mild AR, TR, GA, MR. Mod Pulm HTN    C.StressNuclear/Stress ECHO/Stress test:    D.Vascular:    E. EP:    F. Miscellaneous:    Subjective:  Elyssa Holland is a 81 y.o. female who returns for follow up  today . Doing well since DC from hosp. No CP. Compliant with meds. ROS:  (bold if positive, if negative)             Medications before admission:    Current Outpatient Medications   Medication Sig Dispense    albuterol sulfate (PROVENTIL;VENTOLIN) 2.5 mg/0.5 mL nebu nebulizer solution by Nebulization route as needed for Wheezing.  MELOXICAM PO Take  by mouth daily.  acetaminophen (TYLENOL) 325 mg tablet Take  by mouth every four (4) hours as needed for Pain.  clopidogrel (PLAVIX) 75 mg tab Take 1 Tab by mouth daily. 30 Tab    bumetanide (BUMEX) 0.5 mg tablet Take 1 Tab by mouth daily. 30 Tab    hydroxychloroquine (PLAQUENIL) 200 mg tablet Take 200 mg by mouth daily.  lisinopril (PRINIVIL, ZESTRIL) 40 mg tablet Take 40 mg by mouth daily.  montelukast (SINGULAIR) 10 mg tablet Take 10 mg by mouth daily.  omeprazole (PRILOSEC) 20 mg capsule Take 20 mg by mouth daily.  budesonide-formoterol (SYMBICORT) 160-4.5 mcg/actuation HFAA Take 2 Puffs by inhalation two (2) times a day.  folic acid (FOLVITE) 1 mg tablet Take 1 mg by mouth daily.  multivitamin (ONE A DAY) tablet Take 1 Tab by mouth daily.      aspirin 81 mg chewable tablet Take 81 mg by mouth daily.  atorvastatin (LIPITOR) 20 mg tablet Take 20 mg by mouth daily.  levothyroxine (SYNTHROID) 125 mcg tablet Take 125 mcg by mouth Daily (before breakfast).  metoprolol succinate (TOPROL-XL) 50 mg XL tablet Take 50 mg by mouth daily.  albuterol (PROVENTIL HFA, VENTOLIN HFA, PROAIR HFA) 90 mcg/actuation inhaler Take 2 Puffs by inhalation every six (6) hours as needed for Wheezing.  alendronate (FOSAMAX) 70 mg tablet Take 70 mg by mouth every seven (7) days.  Calcium-Cholecalciferol, D3, (CALCIUM 600 WITH VITAMIN D3) 600 mg(1,500mg) -400 unit cap Take 1 Tab by mouth two (2) times a day. No current facility-administered medications for this visit. Family History of CAD:    No    Social History:  Current  Smoker  Yes    Physical Exam:  Visit Vitals  /66 (BP 1 Location: Right arm, BP Patient Position: Sitting)   Pulse 68   Resp 16   Ht 5' 2\" (1.575 m)   Wt 141 lb (64 kg)   SpO2 98%   BMI 25.79 kg/m²          Gen: Well-developed, well-nourished, in no acute distress  Neck: Supple,No JVD, No Carotid Bruit,   Resp: No accessory muscle use, Clear breath sounds, No rales or rhonchi  Card: Regular Rate,Rythm,Normal S1, S2, No murmurs, rubs or gallop. No thrills.    Abd:  Soft, non-tender, non-distended,BS+,   MSK: No cyanosis  Skin: No rashes    Neuro: moving all four extremities , follows commands appropriately  Psych:  Good insight, oriented to person, place , alert, Nml Affect  LE: No edema    EKG:       LABS:        Lab Results   Component Value Date/Time    WBC 6.5 07/16/2019 04:35 AM    HGB 8.7 (L) 07/16/2019 04:35 AM    HCT 27.9 (L) 07/16/2019 04:35 AM    PLATELET 175 95/08/6801 04:35 AM     Lab Results   Component Value Date/Time    Sodium 142 07/16/2019 04:35 AM    Potassium 3.9 07/16/2019 04:35 AM    Chloride 111 (H) 07/16/2019 04:35 AM    CO2 24 07/16/2019 04:35 AM    Anion gap 7 07/16/2019 04:35 AM    Glucose 81 07/16/2019 04:35 AM    BUN 12 07/16/2019 04:35 AM Creatinine 0.87 07/16/2019 04:35 AM    BUN/Creatinine ratio 14 07/16/2019 04:35 AM    GFR est AA >60 07/16/2019 04:35 AM    GFR est non-AA >60 07/16/2019 04:35 AM    Calcium 8.0 (L) 07/16/2019 04:35 AM       Lab Results   Component Value Date/Time    aPTT 93.1 (HH) 07/15/2019 08:07 AM     No results found for: INR, PTMR, PTP, PT1, PT2  No components found for: Ryan Zhu MD

## 2019-07-20 LAB
BUN SERPL-MCNC: 17 MG/DL (ref 8–27)
BUN/CREAT SERPL: 16 (ref 12–28)
CALCIUM SERPL-MCNC: 9.8 MG/DL (ref 8.7–10.3)
CHLORIDE SERPL-SCNC: 95 MMOL/L (ref 96–106)
CO2 SERPL-SCNC: 26 MMOL/L (ref 20–29)
CREAT SERPL-MCNC: 1.08 MG/DL (ref 0.57–1)
GLUCOSE SERPL-MCNC: 90 MG/DL (ref 65–99)
INTERPRETATION: NORMAL
POTASSIUM SERPL-SCNC: 4.6 MMOL/L (ref 3.5–5.2)
SODIUM SERPL-SCNC: 134 MMOL/L (ref 134–144)

## 2019-08-29 ENCOUNTER — OFFICE VISIT (OUTPATIENT)
Dept: CARDIOLOGY CLINIC | Age: 84
End: 2019-08-29

## 2019-08-29 VITALS
SYSTOLIC BLOOD PRESSURE: 128 MMHG | DIASTOLIC BLOOD PRESSURE: 68 MMHG | WEIGHT: 137.4 LBS | OXYGEN SATURATION: 97 % | BODY MASS INDEX: 25.28 KG/M2 | RESPIRATION RATE: 16 BRPM | HEART RATE: 67 BPM | HEIGHT: 62 IN

## 2019-08-29 DIAGNOSIS — I25.10 CORONARY ARTERY DISEASE INVOLVING NATIVE CORONARY ARTERY OF NATIVE HEART WITHOUT ANGINA PECTORIS: Primary | ICD-10-CM

## 2019-08-29 DIAGNOSIS — I10 ESSENTIAL HYPERTENSION: ICD-10-CM

## 2019-08-29 DIAGNOSIS — E78.5 HYPERLIPIDEMIA LDL GOAL <70: ICD-10-CM

## 2019-08-29 NOTE — PROGRESS NOTES
Chief Complaint   Patient presents with    Follow-up     Patient presents today for a follow up visit for CAD & HTN    Coronary Artery Disease    Hypertension     Visit Vitals  /68 (BP 1 Location: Left arm, BP Patient Position: Sitting)   Pulse 67   Resp 16   Ht 5' 2\" (1.575 m)   Wt 137 lb 6.4 oz (62.3 kg)   SpO2 97%   BMI 25.13 kg/m²       Chest pain denied  SOB - denied  Dizziness denied  Swelling/Edema - denied  Recent hospital visit denied    No Cardiac concerns to discuss today

## 2019-08-29 NOTE — PROGRESS NOTES
Laverta Frankel, MD    [address] [place], [address] [facility]    Office [phone],[facility] [phone]  Pager [phone]    Herlinda Starr is a 81 y.o. female is here for f/u visit . Primary care physician:  Jim Collins MD    Patient Active Problem List   Diagnosis Code    NSTEMI (non-ST elevated myocardial infarction) ([facility] [address] I21.4    Lupus ([facility] M32.9    Asthma J45.909    Hypothyroid E03.9    Coronary artery disease of native artery of native heart with stable angina pectoris ([facility] [address] I25.118    HTN (hypertension), benign I10       Dear Dr. Lisbeth Prajapati,    I had the pleasure of seeing Ms. Herlinda Starr in the office today. Chief complaint:  Chief Complaint   Patient presents with    Follow-up     Patient presents today for a follow up visit for CAD & HTN    Coronary Artery Disease    Hypertension       Assessment:    CAD - NSTEMI(7/15/19 )- PCI to RCA with VINOD  HTN  HLD  Hx of Hypothyroid/SLE/Non Hodgkin's Lymphoma in remission ( last treatment 2013)      Plan:     Cont meds  Getting home PT  Cont meds on Bumex 0.5 mg daily. BMP checked by PCP yesterday. She will try to decrease her Bumex to every other day and see if she has any swelling lower extremities. If she does fine she will take Bumex on a as needed basis. F/u 3  month or earlier as needed      Patient understands the plan. All questions were answered to the patient's satisfaction. Medication Side Effects and Warnings were discussed with patient: yes  Patient Labs were reviewed and or requested:  yes  Patient Past Records were reviewed and or requested: yes    I appreciate the opportunity to be involved in [address] [place] [address] . See note below for details. Please do not hesitate to contact us with questions or concerns. Laverta Frankel, MD    Cardiac Testing/ Procedures: A. Cardiac Cath/PCI:R Radial access  RCA - JR4  LCA - difficulty to access with TIG4;EBU 3.5     R CFA - micropuncture/ultrasound/fluorscopy guided access     L Main: Ca++; Ostial LM 10% ( good reflux/no dampening)     LAD: Med; MLI; D1 - MLI     LCflex: Med; MLI     RCA: Dominant  Prox 95%; Mid 50%; PDA and PLB - small; PLB branch diffuse severe dz        LVEDP:  15     LVEF: Not assessed; Nml by echo     No significant gradient across aortic valve.     PCI: JR4 guide ( with SH)  BMW wire  Pre dil with 2 by 12  VINOD 2.5 by 26  Post dil with 2.75 by 12 ( at high merary - approx 3mm)  GERALD 3 before and after           Specimens Removed : None     Closure Device: TR Band - R radial     R CFA - mynx          B. ECHO/AUNDREA: 7//15/19 - EF 56-60%; Mild AR, TR, AR, MR. Mod Pulm HTN    C.StressNuclear/Stress ECHO/Stress test:    D.Vascular:    E. EP:    F. Miscellaneous:    Subjective:  Zhao Rivers is a 81 y.o. female who returns for follow up  today . No CP. Compliant with meds. ROS:  (bold if positive, if negative)             Medications before admission:    Current Outpatient Medications   Medication Sig Dispense    albuterol sulfate (PROVENTIL;VENTOLIN) 2.5 mg/0.5 mL nebu nebulizer solution by Nebulization route as needed for Wheezing.  MELOXICAM PO Take  by mouth daily.  acetaminophen (TYLENOL) 325 mg tablet Take  by mouth every four (4) hours as needed for Pain.  clopidogrel (PLAVIX) 75 mg tab Take 1 Tab by mouth daily. 30 Tab    bumetanide (BUMEX) 0.5 mg tablet Take 1 Tab by mouth daily. 30 Tab    hydroxychloroquine (PLAQUENIL) 200 mg tablet Take 200 mg by mouth daily.  lisinopril (PRINIVIL, ZESTRIL) 40 mg tablet Take 40 mg by mouth daily.  montelukast (SINGULAIR) 10 mg tablet Take 10 mg by mouth daily.  omeprazole (PRILOSEC) 20 mg capsule Take 20 mg by mouth daily.  budesonide-formoterol (SYMBICORT) 160-4.5 mcg/actuation HFAA Take 2 Puffs by inhalation two (2) times a day.  folic acid (FOLVITE) 1 mg tablet Take 1 mg by mouth daily.      multivitamin (ONE A DAY) tablet Take 1 Tab by mouth daily.  aspirin 81 mg chewable tablet Take 81 mg by mouth daily.  atorvastatin (LIPITOR) 20 mg tablet Take 20 mg by mouth daily.  levothyroxine (SYNTHROID) 125 mcg tablet Take 125 mcg by mouth Daily (before breakfast).  metoprolol succinate (TOPROL-XL) 50 mg XL tablet Take 50 mg by mouth daily.  albuterol (PROVENTIL HFA, VENTOLIN HFA, PROAIR HFA) 90 mcg/actuation inhaler Take 2 Puffs by inhalation every six (6) hours as needed for Wheezing.  alendronate (FOSAMAX) 70 mg tablet Take 70 mg by mouth every seven (7) days.  Calcium-Cholecalciferol, D3, (CALCIUM 600 WITH VITAMIN D3) 600 mg(1,500mg) -400 unit cap Take 1 Tab by mouth daily. No current facility-administered medications for this visit. Family History of CAD:    No    Social History:  Current  Smoker  Yes    Physical Exam:  Visit Vitals  /68 (BP 1 Location: Left arm, BP Patient Position: Sitting)   Pulse 67   Resp 16   Ht 5' 2\" (1.575 m)   Wt 137 lb 6.4 oz (62.3 kg)   SpO2 97%   BMI 25.13 kg/m²          Gen: Well-developed, well-nourished, in no acute distress  Neck: Supple,No JVD, No Carotid Bruit,   Resp: No accessory muscle use, Clear breath sounds, No rales or rhonchi  Card: Regular Rate,Rythm,Normal S1, S2, No murmurs, rubs or gallop. No thrills.    Abd:  Soft, non-tender, non-distended,BS+,   MSK: No cyanosis  Skin: No rashes    Neuro: moving all four extremities , follows commands appropriately  Psych:  Good insight, oriented to person, place , alert, Nml Affect  LE: No edema    EKG:       LABS:        Lab Results   Component Value Date/Time    WBC 6.5 07/16/2019 04:35 AM    HGB 8.7 (L) 07/16/2019 04:35 AM    HCT 27.9 (L) 07/16/2019 04:35 AM    PLATELET 856 15/08/8664 04:35 AM     Lab Results   Component Value Date/Time    Sodium 134 07/19/2019 03:24 PM    Potassium 4.6 07/19/2019 03:24 PM    Chloride 95 (L) 07/19/2019 03:24 PM    CO2 26 07/19/2019 03:24 PM    Anion gap 7 07/16/2019 04:35 AM Glucose 90 07/19/2019 03:24 PM    BUN 17 07/19/2019 03:24 PM    Creatinine 1.08 (H) 07/19/2019 03:24 PM    BUN/Creatinine ratio 16 07/19/2019 03:24 PM    GFR est AA 55 (L) 07/19/2019 03:24 PM    GFR est non-AA 48 (L) 07/19/2019 03:24 PM    Calcium 9.8 07/19/2019 03:24 PM       Lab Results   Component Value Date/Time    aPTT 93.1 (HH) 07/15/2019 08:07 AM     No results found for: INR, PTMR, PTP, PT1, PT2  No components found for: Bobby Wu MD

## 2019-11-22 ENCOUNTER — OFFICE VISIT (OUTPATIENT)
Dept: CARDIOLOGY CLINIC | Age: 84
End: 2019-11-22

## 2019-11-22 VITALS
SYSTOLIC BLOOD PRESSURE: 118 MMHG | HEART RATE: 69 BPM | DIASTOLIC BLOOD PRESSURE: 56 MMHG | BODY MASS INDEX: 26.31 KG/M2 | WEIGHT: 143 LBS | OXYGEN SATURATION: 98 % | HEIGHT: 62 IN

## 2019-11-22 DIAGNOSIS — I25.10 CORONARY ARTERY DISEASE INVOLVING NATIVE CORONARY ARTERY OF NATIVE HEART WITHOUT ANGINA PECTORIS: Primary | ICD-10-CM

## 2019-11-22 DIAGNOSIS — I10 ESSENTIAL HYPERTENSION: ICD-10-CM

## 2019-11-22 DIAGNOSIS — E78.5 HYPERLIPIDEMIA LDL GOAL <70: ICD-10-CM

## 2019-11-22 RX ORDER — ONDANSETRON 8 MG/1
8 TABLET, ORALLY DISINTEGRATING ORAL
COMMUNITY

## 2019-11-22 NOTE — LETTER
11/22/19 Patient: Ada Castro YOB: 1935 Date of Visit: 11/22/2019 Pradip Desir MD 
[address] [address] [address] VIA Facsimile: [phone] Dear Pradip Desir MD, Thank you for referring Ms. Ada Castro to [facility] for evaluation. My notes for this consultation are attached. If you have questions, please do not hesitate to call me. I look forward to following your patient along with you. Sincerely, Jeison Nielson MD

## 2019-11-22 NOTE — PROGRESS NOTES
Arya Medina MD    [address] [facility] [place], [address] [facility]    Office [phone],[facility] [phone]  Pager [phone]    Jojo Madrid is a 81 y.o. female is here for f/u visit . Primary care physician:  Alton Thompson MD    Patient Active Problem List   Diagnosis Code    NSTEMI (non-ST elevated myocardial infarction) ([facility] [address] I21.4    Lupus ([facility] [address] M32.9    Asthma J45.909    Hypothyroid E03.9    Coronary artery disease of native artery of native heart with stable angina pectoris ([facility] [address] I25.118    HTN (hypertension), benign I10       Dear Anna Reed,    I had the pleasure of seeing Ms. Jojo Madrid in the office today. Chief complaint:  Chief Complaint   Patient presents with    Coronary Artery Disease       Assessment:    CAD - NSTEMI(7/15/19 )- PCI to RCA with VINOD  HTN  HLD  Hx of Hypothyroid/SLE/Non Hodgkin's Lymphoma in remission ( last treatment 2013)  History of lupus/rheumatoid arthritis  History of CLARISSE  CKD - Her BMP was checked recently and her creatinine was 1.78. K was 4.8.  (8/29/2019). Her Bumex was discontinued. She is also on lisinopril 40 mg daily. Plan:   Continue medications. She has an appointment to see Dr. Siomara He and he will continue to monitor creatinine. May need to get her off lisinopril if the creatinine continues to trend upwards. Continue adapt-aspirin/Plavix till July 2020. Blood pressure controlled. Aggressive cardiovascular risk factor modification. Follow-up in 6 months or earlier as needed. Patient understands the plan. All questions were answered to the patient's satisfaction. Medication Side Effects and Warnings were discussed with patient: yes  Patient Labs were reviewed and or requested:  yes  Patient Past Records were reviewed and or requested: yes    I appreciate the opportunity to be involved in [address] [place] [address]  See note below for details.  Please do not hesitate to contact us with questions or concerns. Bessie Rodrigues MD    Cardiac Testing/ Procedures: A. Cardiac Cath/PCI:R Radial access  RCA - JR4  LCA - difficulty to access with TIG4;EBU 3.5     R CFA - micropuncture/ultrasound/fluorscopy guided access     L Main: Ca++; Ostial LM 10% ( good reflux/no dampening)     LAD: Med; MLI; D1 - MLI     LCflex: Med; MLI     RCA: Dominant  Prox 95%; Mid 50%; PDA and PLB - small; PLB branch diffuse severe dz        LVEDP:  15     LVEF: Not assessed; Nml by echo     No significant gradient across aortic valve.     PCI: JR4 guide ( with SH)  BMW wire  Pre dil with 2 by 12  VINOD 2.5 by 26  Post dil with 2.75 by 12 ( at high merary - approx 3mm)  GERALD 3 before and after           Specimens Removed : None     Closure Device: TR Band - R radial     R CFA - mynx          B. ECHO/AUNDREA: 7//15/19 - EF 56-60%; Mild AR, TR, [place], MR. Mod Pulm HTN    C.StressNuclear/Stress ECHO/Stress test:    D.Vascular:    E. EP:    F. Miscellaneous:    Subjective:  Marisol Charles is a 81 y.o. female who returns for follow up  today . No CP. Compliant with meds. Has had problems with COPD exacerbation. Her BMP was checked recently and her creatinine was 1.78. K was 4.8.  (8/29/2019). Her Bumex was discontinued. She is also on lisinopril 40 mg daily. Her daughter-Cesia is a patient here/former nurse at [employer]    ROS:  (bold if positive, if negative)    SOB/SOUSA         Medications before admission:    Current Outpatient Medications   Medication Sig Dispense    ondansetron (ZOFRAN ODT) 8 mg disintegrating tablet Take 8 mg by mouth every eight (8) hours as needed for Nausea.  aclidinium bromide (TUDORZA PRESSAIR) 400 mcg/actuation inhaler Take 1 Puff by inhalation.  albuterol sulfate (PROVENTIL;VENTOLIN) 2.5 mg/0.5 mL nebu nebulizer solution by Nebulization route as needed for Wheezing.  MELOXICAM PO Take 15 mg by mouth daily.      acetaminophen (TYLENOL) 325 mg tablet Take  by mouth every four (4) hours as needed for Pain.  clopidogrel (PLAVIX) 75 mg tab Take 1 Tab by mouth daily. 30 Tab    bumetanide (BUMEX) 0.5 mg tablet Take 1 Tab by mouth daily. 30 Tab    hydroxychloroquine (PLAQUENIL) 200 mg tablet Take 200 mg by mouth daily.  lisinopril (PRINIVIL, ZESTRIL) 40 mg tablet Take 40 mg by mouth daily.  montelukast (SINGULAIR) 10 mg tablet Take 10 mg by mouth daily.  omeprazole (PRILOSEC) 20 mg capsule Take 20 mg by mouth daily.  budesonide-formoterol (SYMBICORT) 160-4.5 mcg/actuation HFAA Take 2 Puffs by inhalation two (2) times a day.  folic acid (FOLVITE) 1 mg tablet Take 1 mg by mouth daily.  multivitamin (ONE A DAY) tablet Take 1 Tab by mouth daily.  aspirin 81 mg chewable tablet Take 81 mg by mouth daily.  atorvastatin (LIPITOR) 20 mg tablet Take 20 mg by mouth daily.  levothyroxine (SYNTHROID) 125 mcg tablet Take 125 mcg by mouth Daily (before breakfast).  metoprolol succinate (TOPROL-XL) 50 mg XL tablet Take 50 mg by mouth daily.  albuterol (PROVENTIL HFA, VENTOLIN HFA, PROAIR HFA) 90 mcg/actuation inhaler Take 2 Puffs by inhalation every six (6) hours as needed for Wheezing.  alendronate (FOSAMAX) 70 mg tablet Take 70 mg by mouth every seven (7) days.  Calcium-Cholecalciferol, D3, (CALCIUM 600 WITH VITAMIN D3) 600 mg(1,500mg) -400 unit cap Take 1 Tab by mouth daily. No current facility-administered medications for this visit. Family History of CAD:    No    Social History:  Current  Smoker  Yes    Physical Exam:  Visit Vitals  /56 (BP 1 Location: Left arm, BP Patient Position: Sitting)   Pulse 69   Ht 5' 2\" (1.575 m)   Wt 143 lb (64.9 kg)   SpO2 98%   BMI 26.16 kg/m²          Gen: Well-developed, well-nourished, in no acute distress  Neck: Supple,No JVD, No Carotid Bruit,   Resp: No accessory muscle use, Clear breath sounds, No rales or rhonchi  Card: Regular Rate,Rythm,Normal S1, S2, 2/6 sys murmur+,No rubs or gallop. No thrills.    Abd: Soft, BS+,   MSK: No cyanosis  Skin: No rashes    Neuro: moving all four extremities , follows commands appropriately  Psych:  Good insight, oriented to person, place , alert, Nml Affect  LE: No edema    EKG:       LABS:        Lab Results   Component Value Date/Time    WBC 6.5 07/16/2019 04:35 AM    HGB 8.7 (L) 07/16/2019 04:35 AM    HCT 27.9 (L) 07/16/2019 04:35 AM    PLATELET 391 98/09/8270 04:35 AM     Lab Results   Component Value Date/Time    Sodium 134 07/19/2019 03:24 PM    Potassium 4.6 07/19/2019 03:24 PM    Chloride 95 (L) 07/19/2019 03:24 PM    CO2 26 07/19/2019 03:24 PM    Anion gap 7 07/16/2019 04:35 AM    Glucose 90 07/19/2019 03:24 PM    BUN 17 07/19/2019 03:24 PM    Creatinine 1.08 (H) 07/19/2019 03:24 PM    BUN/Creatinine ratio 16 07/19/2019 03:24 PM    GFR est AA 55 (L) 07/19/2019 03:24 PM    GFR est non-AA 48 (L) 07/19/2019 03:24 PM    Calcium 9.8 07/19/2019 03:24 PM       Lab Results   Component Value Date/Time    aPTT 93.1 (HH) 07/15/2019 08:07 AM     No results found for: INR, PTMR, PTP, PT1, PT2, INREXT, INREXT  No components found for: Aisha Morgan MD

## 2019-11-22 NOTE — PROGRESS NOTES
Camilo Velez is a 81 y.o. female    Chief Complaint   Patient presents with    Coronary Artery Disease       Chest pain patient states some discomfort    SOB patient she has COPD    Dizziness No    Swelling No    Refills No     Patient uses [address]    Visit Vitals  /56 (BP 1 Location: Left arm, BP Patient Position: Sitting)   Pulse 69   Ht 5' 2\" (1.575 m)   Wt 143 lb (64.9 kg)   SpO2 98%   BMI 26.16 kg/m²       1. Have you been to the ER, urgent care clinic since your last visit? Hospitalized since your last visit? No    2. Have you seen or consulted any other health care providers outside of the [address] [place] [address] since your last visit? Include any pap smears or colon screening.   No

## 2020-06-22 ENCOUNTER — OFFICE VISIT (OUTPATIENT)
Dept: CARDIOLOGY CLINIC | Age: 85
End: 2020-06-22

## 2020-06-22 VITALS
SYSTOLIC BLOOD PRESSURE: 152 MMHG | WEIGHT: 132 LBS | HEART RATE: 64 BPM | OXYGEN SATURATION: 98 % | RESPIRATION RATE: 16 BRPM | BODY MASS INDEX: 24.29 KG/M2 | DIASTOLIC BLOOD PRESSURE: 72 MMHG | HEIGHT: 62 IN

## 2020-06-22 DIAGNOSIS — I10 ESSENTIAL HYPERTENSION: Primary | ICD-10-CM

## 2020-06-22 DIAGNOSIS — I25.10 CORONARY ARTERY DISEASE INVOLVING NATIVE CORONARY ARTERY OF NATIVE HEART WITHOUT ANGINA PECTORIS: ICD-10-CM

## 2020-06-22 RX ORDER — TIOTROPIUM BROMIDE INHALATION SPRAY 1.56 UG/1
2 SPRAY, METERED RESPIRATORY (INHALATION) DAILY
COMMUNITY
Start: 2020-06-09 | End: 2021-04-23 | Stop reason: ALTCHOICE

## 2020-06-22 RX ORDER — FLUTICASONE PROPIONATE AND SALMETEROL 100; 50 UG/1; UG/1
1 POWDER RESPIRATORY (INHALATION) EVERY 12 HOURS
COMMUNITY
End: 2021-01-22

## 2020-06-22 NOTE — PROGRESS NOTES
Jose David Almaraz MD    [address] [facility] [place], [address] [facility]    Office [phone],[facility] [phone]  Pager [phone]    Karina Toledo is a 81 y.o. female is here for f/u visit . Primary care physician:  Moiz Marin MD    Patient Active Problem List   Diagnosis Code    NSTEMI (non-ST elevated myocardial infarction) ([address] I21.4    Lupus ([address] M32.9    Asthma J45.909    Hypothyroid E03.9    Coronary artery disease of native artery of native heart with stable angina pectoris ([address] I25.118    HTN (hypertension), benign I10       Dear Larayne Meckel,    I had the pleasure of seeing Ms. Karina Toledo in the office today. Chief complaint:  Chief Complaint   Patient presents with    Hypertension    Coronary Artery Disease    Cholesterol Problem       Assessment:    CAD - NSTEMI(7/15/19 )- PCI to RCA with VINOD  HTN  HLD  Hx of Hypothyroid/SLE/Non Hodgkin's Lymphoma in remission ( last treatment 2013)  History of lupus/rheumatoid arthritis  History of CLARISSE  CKD   Anemia - Hb 9.7 ( 6/2/20)    Plan:   Continue medications. She is currently off  Bumex and her creatinine has decreased - 1.31 ( 6/2/20)  Continue DAPT- aspirin/Plavix - should be on it till July 2020. If there is concern for bleeding/further decrease in hemoglobin-can stop Plavix. Will address next visit. Blood pressure elevated here. However, patient states that usually is well controlled. Continue to monitor. Aggressive cardiovascular risk factor modification. Follow-up in 3  months or earlier as needed. Patient understands the plan. All questions were answered to the patient's satisfaction. Medication Side Effects and Warnings were discussed with patient: yes  Patient Labs were reviewed and or requested:  yes  Patient Past Records were reviewed and or requested: yes    I appreciate the opportunity to be involved in [address] [place] [address]  See note below for details.  Please do not hesitate to contact us with questions or concerns. Lizzette Bowen MD    Cardiac Testing/ Procedures: A. Cardiac Cath/PCI:R Radial access  RCA - JR4  LCA - difficulty to access with TIG4;EBU 3.5     R CFA - micropuncture/ultrasound/fluorscopy guided access     L Main: Ca++; Ostial LM 10% ( good reflux/no dampening)     LAD: Med; MLI; D1 - MLI     LCflex: Med; MLI     RCA: Dominant  Prox 95%; Mid 50%; PDA and PLB - small; PLB branch diffuse severe dz        LVEDP:  15     LVEF: Not assessed; Nml by echo     No significant gradient across aortic valve.     PCI: JR4 guide ( with SH)  BMW wire  Pre dil with 2 by 12  VINOD 2.5 by 26  Post dil with 2.75 by 12 ( at high merary - approx 3mm)  GERALD 3 before and after           Specimens Removed : None     Closure Device: TR Band - R radial     R CFA - mynx          B. ECHO/AUNDREA: 7//15/19 - EF 56-60%; Mild AR, TR, KS, MR. Mod Pulm HTN    C.StressNuclear/Stress ECHO/Stress test:    D.Vascular:    E. EP:    F. Miscellaneous:    Subjective:  Demetria Mayberry is a 81 y.o. female who returns for follow up  today . No CP. Compliant with meds. History of COPD-mild chronic dyspnea but severe exertion. States that she has lost some weight but is trying to eat better. Reviewed labs which the patient brought from her PCP-creatinine 1.  3 1. Hemoglobin 9.7 (6/2/2020)  Her daughter-Cesia is a patient here/former nurse at [employer]    ROS:  (bold if positive, if negative)    SOB/SOUSA         Medications before admission:    Current Outpatient Medications   Medication Sig Dispense    Spiriva Respimat 1.25 mcg/actuation inhaler Take 2 Puffs by inhalation daily.  fluticasone propion-salmeteroL (Advair Diskus) 100-50 mcg/dose diskus inhaler Take 1 Puff by inhalation every twelve (12) hours.  ondansetron (ZOFRAN ODT) 8 mg disintegrating tablet Take 8 mg by mouth every eight (8) hours as needed for Nausea.      aclidinium bromide (TUDORZA PRESSAIR) 400 mcg/actuation inhaler Take 1 Puff by inhalation.  albuterol sulfate (PROVENTIL;VENTOLIN) 2.5 mg/0.5 mL nebu nebulizer solution by Nebulization route as needed for Wheezing.  MELOXICAM PO Take 15 mg by mouth daily.  acetaminophen (TYLENOL) 325 mg tablet Take  by mouth every four (4) hours as needed for Pain.  clopidogrel (PLAVIX) 75 mg tab Take 1 Tab by mouth daily. 30 Tab    bumetanide (BUMEX) 0.5 mg tablet Take 1 Tab by mouth daily. 30 Tab    hydroxychloroquine (PLAQUENIL) 200 mg tablet Take 200 mg by mouth daily.  lisinopril (PRINIVIL, ZESTRIL) 40 mg tablet Take 40 mg by mouth daily.  montelukast (SINGULAIR) 10 mg tablet Take 10 mg by mouth daily.  omeprazole (PRILOSEC) 20 mg capsule Take 20 mg by mouth daily.  folic acid (FOLVITE) 1 mg tablet Take 1 mg by mouth daily.  multivitamin (ONE A DAY) tablet Take 1 Tab by mouth daily.  aspirin 81 mg chewable tablet Take 81 mg by mouth daily.  atorvastatin (LIPITOR) 20 mg tablet Take 20 mg by mouth daily.  levothyroxine (SYNTHROID) 125 mcg tablet Take 125 mcg by mouth Daily (before breakfast).  metoprolol succinate (TOPROL-XL) 50 mg XL tablet Take 50 mg by mouth daily. Indications: paroxysmal supraventricular tachycardia     alendronate (FOSAMAX) 70 mg tablet Take 70 mg by mouth every seven (7) days.  Calcium-Cholecalciferol, D3, (CALCIUM 600 WITH VITAMIN D3) 600 mg(1,500mg) -400 unit cap Take 1 Tab by mouth daily. Indications: osteoporosis, a condition of weak bones     budesonide-formoterol (SYMBICORT) 160-4.5 mcg/actuation HFAA Take 2 Puffs by inhalation two (2) times a day.  albuterol (PROVENTIL HFA, VENTOLIN HFA, PROAIR HFA) 90 mcg/actuation inhaler Take 2 Puffs by inhalation every six (6) hours as needed for Wheezing. No current facility-administered medications for this visit.         Family History of CAD:    No    Social History:  Current  Smoker  Yes    Physical Exam:  Visit Vitals  /72 (BP 1 Location: Left arm, BP Patient Position: Sitting)   Pulse 64   Resp 16   Ht 5' 2\" (1.575 m)   Wt 132 lb (59.9 kg)   SpO2 98%   BMI 24.14 kg/m²          Gen: Well-developed, well-nourished, in no acute distress  Neck: Supple,No JVD, No Carotid Bruit,   Resp: No accessory muscle use, Clear breath sounds, No rales or rhonchi  Card: Regular Rate,Rythm,Normal S1, S2, 2/6 sys murmur+,No rubs or gallop. No thrills.    Abd:  Soft, BS+,   MSK: No cyanosis  Skin: No rashes    Neuro: moving all four extremities , follows commands appropriately  Psych:  Good insight, oriented to person, place , alert, Nml Affect  LE: No edema    EKG:       LABS:        Lab Results   Component Value Date/Time    WBC 6.5 07/16/2019 04:35 AM    HGB 8.7 (L) 07/16/2019 04:35 AM    HCT 27.9 (L) 07/16/2019 04:35 AM    PLATELET 442 37/22/9781 04:35 AM     Lab Results   Component Value Date/Time    Sodium 134 07/19/2019 03:24 PM    Potassium 4.6 07/19/2019 03:24 PM    Chloride 95 (L) 07/19/2019 03:24 PM    CO2 26 07/19/2019 03:24 PM    Anion gap 7 07/16/2019 04:35 AM    Glucose 90 07/19/2019 03:24 PM    BUN 17 07/19/2019 03:24 PM    Creatinine 1.08 (H) 07/19/2019 03:24 PM    BUN/Creatinine ratio 16 07/19/2019 03:24 PM    GFR est AA 55 (L) 07/19/2019 03:24 PM    GFR est non-AA 48 (L) 07/19/2019 03:24 PM    Calcium 9.8 07/19/2019 03:24 PM       Lab Results   Component Value Date/Time    aPTT 93.1 (HH) 07/15/2019 08:07 AM     No results found for: INR, PTMR, PTP, PT1, PT2, INREXT, INREXT  No components found for: Cynthia Bearden MD

## 2020-06-22 NOTE — LETTER
6/22/20 Patient: Linda Lucas YOB: 1935 Date of Visit: 6/22/2020 Edgardo Townsend MD 
[address] [address] VIA Facsimile: [phone] Dear Edgardo Townsend MD, Thank you for referring Ms. Linda Lucas to [facility] for evaluation. My notes for this consultation are attached. If you have questions, please do not hesitate to call me. I look forward to following your patient along with you. Sincerely, Mihai Gross MD

## 2020-10-08 ENCOUNTER — APPOINTMENT (OUTPATIENT)
Dept: GENERAL RADIOLOGY | Age: 85
DRG: 208 | End: 2020-10-08
Attending: INTERNAL MEDICINE
Payer: MEDICARE

## 2020-10-08 ENCOUNTER — HOSPITAL ENCOUNTER (INPATIENT)
Age: 85
LOS: 5 days | Discharge: HOME HEALTH | DRG: 208 | End: 2020-10-13
Attending: INTERNAL MEDICINE | Admitting: FAMILY MEDICINE
Payer: MEDICARE

## 2020-10-08 ENCOUNTER — TELEPHONE (OUTPATIENT)
Dept: CARDIOLOGY CLINIC | Age: 85
End: 2020-10-08

## 2020-10-08 DIAGNOSIS — M32.9 LUPUS (HCC): Chronic | ICD-10-CM

## 2020-10-08 DIAGNOSIS — I21.A1 TYPE 2 MYOCARDIAL INFARCTION (HCC): ICD-10-CM

## 2020-10-08 DIAGNOSIS — I25.118 CORONARY ARTERY DISEASE OF NATIVE ARTERY OF NATIVE HEART WITH STABLE ANGINA PECTORIS (HCC): ICD-10-CM

## 2020-10-08 DIAGNOSIS — I21.4 NSTEMI (NON-ST ELEVATED MYOCARDIAL INFARCTION) (HCC): ICD-10-CM

## 2020-10-08 DIAGNOSIS — I50.9 CONGESTIVE HEART FAILURE, UNSPECIFIED HF CHRONICITY, UNSPECIFIED HEART FAILURE TYPE (HCC): ICD-10-CM

## 2020-10-08 DIAGNOSIS — I10 HTN (HYPERTENSION), BENIGN: ICD-10-CM

## 2020-10-08 DIAGNOSIS — J96.21 ACUTE ON CHRONIC RESPIRATORY FAILURE WITH HYPOXIA (HCC): ICD-10-CM

## 2020-10-08 LAB
ANION GAP SERPL CALC-SCNC: 12 MMOL/L (ref 5–15)
APPEARANCE UR: CLEAR
ARTERIAL PATENCY WRIST A: YES
BACTERIA URNS QL MICRO: NEGATIVE /HPF
BASE DEFICIT BLDA-SCNC: 4.3 MMOL/L
BDY SITE: ABNORMAL
BILIRUB UR QL: NEGATIVE
BNP SERPL-MCNC: ABNORMAL PG/ML
BUN SERPL-MCNC: 13 MG/DL (ref 6–20)
BUN/CREAT SERPL: 12 (ref 12–20)
CALCIUM SERPL-MCNC: 7.7 MG/DL (ref 8.5–10.1)
CHLORIDE SERPL-SCNC: 100 MMOL/L (ref 97–108)
CO2 SERPL-SCNC: 21 MMOL/L (ref 21–32)
COLOR UR: ABNORMAL
CREAT SERPL-MCNC: 1.11 MG/DL (ref 0.55–1.02)
D DIMER PPP FEU-MCNC: 3.91 MG/L FEU (ref 0–0.65)
EPITH CASTS URNS QL MICRO: ABNORMAL /LPF
ERYTHROCYTE [DISTWIDTH] IN BLOOD BY AUTOMATED COUNT: 18.1 % (ref 11.5–14.5)
FERRITIN SERPL-MCNC: 88 NG/ML (ref 26–388)
FIO2 ON VENT: 40 %
GAS FLOW.O2 SETTING OXYMISER: 20 L/MIN
GLUCOSE SERPL-MCNC: 172 MG/DL (ref 65–100)
GLUCOSE UR STRIP.AUTO-MCNC: NEGATIVE MG/DL
HCO3 BLDA-SCNC: 20 MMOL/L (ref 22–26)
HCT VFR BLD AUTO: 34.4 % (ref 35–47)
HGB BLD-MCNC: 10.7 G/DL (ref 11.5–16)
HGB UR QL STRIP: NEGATIVE
HYALINE CASTS URNS QL MICRO: ABNORMAL /LPF (ref 0–5)
KETONES UR QL STRIP.AUTO: NEGATIVE MG/DL
LACTATE SERPL-SCNC: 3.7 MMOL/L (ref 0.4–2)
LDH SERPL L TO P-CCNC: 208 U/L (ref 81–246)
LEUKOCYTE ESTERASE UR QL STRIP.AUTO: NEGATIVE
MAGNESIUM SERPL-MCNC: 1.8 MG/DL (ref 1.6–2.4)
MCH RBC QN AUTO: 25.1 PG (ref 26–34)
MCHC RBC AUTO-ENTMCNC: 31.1 G/DL (ref 30–36.5)
MCV RBC AUTO: 80.8 FL (ref 80–99)
NITRITE UR QL STRIP.AUTO: NEGATIVE
NRBC # BLD: 0 K/UL (ref 0–0.01)
NRBC BLD-RTO: 0 PER 100 WBC
PCO2 BLDA: 36 MMHG (ref 35–45)
PEEP MAX SETTING VENT: 8 CM[H2O]
PH BLDA: 7.37 [PH] (ref 7.35–7.45)
PH UR STRIP: 5.5 [PH] (ref 5–8)
PHOSPHATE SERPL-MCNC: 2.9 MG/DL (ref 2.6–4.7)
PLATELET # BLD AUTO: 130 K/UL (ref 150–400)
PO2 BLDA: 129 MMHG (ref 80–100)
POTASSIUM SERPL-SCNC: 3.6 MMOL/L (ref 3.5–5.1)
PROT UR STRIP-MCNC: ABNORMAL MG/DL
RBC # BLD AUTO: 4.26 M/UL (ref 3.8–5.2)
RBC #/AREA URNS HPF: ABNORMAL /HPF (ref 0–5)
SAO2 % BLD: 99 % (ref 92–97)
SAO2% DEVICE SAO2% SENSOR NAME: ABNORMAL
SODIUM SERPL-SCNC: 133 MMOL/L (ref 136–145)
SP GR UR REFRACTOMETRY: 1.01 (ref 1–1.03)
SPECIMEN SITE: ABNORMAL
TROPONIN I SERPL-MCNC: 0.27 NG/ML
UA: UC IF INDICATED,UAUC: ABNORMAL
UROBILINOGEN UR QL STRIP.AUTO: 0.2 EU/DL (ref 0.2–1)
VENTILATION MODE VENT: ABNORMAL
VT SETTING VENT: 400 ML
WBC # BLD AUTO: 21.4 K/UL (ref 3.6–11)
WBC URNS QL MICRO: ABNORMAL /HPF (ref 0–4)

## 2020-10-08 PROCEDURE — 74011250636 HC RX REV CODE- 250/636: Performed by: INTERNAL MEDICINE

## 2020-10-08 PROCEDURE — 83735 ASSAY OF MAGNESIUM: CPT

## 2020-10-08 PROCEDURE — 80048 BASIC METABOLIC PNL TOTAL CA: CPT

## 2020-10-08 PROCEDURE — 82803 BLOOD GASES ANY COMBINATION: CPT

## 2020-10-08 PROCEDURE — 74011250636 HC RX REV CODE- 250/636: Performed by: FAMILY MEDICINE

## 2020-10-08 PROCEDURE — 65610000006 HC RM INTENSIVE CARE

## 2020-10-08 PROCEDURE — 36415 COLL VENOUS BLD VENIPUNCTURE: CPT

## 2020-10-08 PROCEDURE — 2709999900 HC NON-CHARGEABLE SUPPLY

## 2020-10-08 PROCEDURE — 81001 URINALYSIS AUTO W/SCOPE: CPT

## 2020-10-08 PROCEDURE — 84484 ASSAY OF TROPONIN QUANT: CPT

## 2020-10-08 PROCEDURE — 94002 VENT MGMT INPAT INIT DAY: CPT

## 2020-10-08 PROCEDURE — 83880 ASSAY OF NATRIURETIC PEPTIDE: CPT

## 2020-10-08 PROCEDURE — 83605 ASSAY OF LACTIC ACID: CPT

## 2020-10-08 PROCEDURE — 87070 CULTURE OTHR SPECIMN AEROBIC: CPT

## 2020-10-08 PROCEDURE — 87186 SC STD MICRODIL/AGAR DIL: CPT

## 2020-10-08 PROCEDURE — 87077 CULTURE AEROBIC IDENTIFY: CPT

## 2020-10-08 PROCEDURE — 71045 X-RAY EXAM CHEST 1 VIEW: CPT

## 2020-10-08 PROCEDURE — 84100 ASSAY OF PHOSPHORUS: CPT

## 2020-10-08 PROCEDURE — 83615 LACTATE (LD) (LDH) ENZYME: CPT

## 2020-10-08 PROCEDURE — 87635 SARS-COV-2 COVID-19 AMP PRB: CPT

## 2020-10-08 PROCEDURE — 94640 AIRWAY INHALATION TREATMENT: CPT

## 2020-10-08 PROCEDURE — 85027 COMPLETE CBC AUTOMATED: CPT

## 2020-10-08 PROCEDURE — 82728 ASSAY OF FERRITIN: CPT

## 2020-10-08 PROCEDURE — 85379 FIBRIN DEGRADATION QUANT: CPT

## 2020-10-08 PROCEDURE — 5A1945Z RESPIRATORY VENTILATION, 24-96 CONSECUTIVE HOURS: ICD-10-PCS | Performed by: FAMILY MEDICINE

## 2020-10-08 PROCEDURE — 65660000000 HC RM CCU STEPDOWN

## 2020-10-08 PROCEDURE — 87040 BLOOD CULTURE FOR BACTERIA: CPT

## 2020-10-08 PROCEDURE — 74011000250 HC RX REV CODE- 250: Performed by: INTERNAL MEDICINE

## 2020-10-08 RX ORDER — SODIUM CHLORIDE 9 MG/ML
50 INJECTION, SOLUTION INTRAVENOUS CONTINUOUS
Status: DISCONTINUED | OUTPATIENT
Start: 2020-10-08 | End: 2020-10-09

## 2020-10-08 RX ORDER — PROPOFOL 10 MG/ML
0-50 VIAL (ML) INTRAVENOUS
Status: DISCONTINUED | OUTPATIENT
Start: 2020-10-08 | End: 2020-10-10

## 2020-10-08 RX ORDER — LEVOFLOXACIN 5 MG/ML
750 INJECTION, SOLUTION INTRAVENOUS EVERY 24 HOURS
Status: DISCONTINUED | OUTPATIENT
Start: 2020-10-08 | End: 2020-10-08

## 2020-10-08 RX ORDER — FUROSEMIDE 10 MG/ML
40 INJECTION INTRAMUSCULAR; INTRAVENOUS ONCE
Status: COMPLETED | OUTPATIENT
Start: 2020-10-08 | End: 2020-10-08

## 2020-10-08 RX ORDER — LEVOFLOXACIN 5 MG/ML
750 INJECTION, SOLUTION INTRAVENOUS
Status: DISCONTINUED | OUTPATIENT
Start: 2020-10-08 | End: 2020-10-11

## 2020-10-08 RX ORDER — ARFORMOTEROL TARTRATE 15 UG/2ML
15 SOLUTION RESPIRATORY (INHALATION)
Status: DISCONTINUED | OUTPATIENT
Start: 2020-10-08 | End: 2020-10-13 | Stop reason: HOSPADM

## 2020-10-08 RX ORDER — IPRATROPIUM BROMIDE AND ALBUTEROL SULFATE 2.5; .5 MG/3ML; MG/3ML
3 SOLUTION RESPIRATORY (INHALATION)
Status: DISCONTINUED | OUTPATIENT
Start: 2020-10-08 | End: 2020-10-10

## 2020-10-08 RX ORDER — BUDESONIDE 0.5 MG/2ML
500 INHALANT ORAL
Status: DISCONTINUED | OUTPATIENT
Start: 2020-10-08 | End: 2020-10-13 | Stop reason: HOSPADM

## 2020-10-08 RX ADMIN — FUROSEMIDE 40 MG: 10 INJECTION, SOLUTION INTRAMUSCULAR; INTRAVENOUS at 22:34

## 2020-10-08 RX ADMIN — PROPOFOL 35 MCG/KG/MIN: 10 INJECTION, EMULSION INTRAVENOUS at 23:52

## 2020-10-08 RX ADMIN — LEVOFLOXACIN 750 MG: 5 INJECTION, SOLUTION INTRAVENOUS at 18:36

## 2020-10-08 RX ADMIN — PHENYLEPHRINE HYDROCHLORIDE 30 MCG/MIN: 10 INJECTION INTRAVENOUS at 20:05

## 2020-10-08 RX ADMIN — ARFORMOTEROL TARTRATE 15 MCG: 15 SOLUTION RESPIRATORY (INHALATION) at 20:09

## 2020-10-08 RX ADMIN — PHENYLEPHRINE HYDROCHLORIDE 30 MCG/MIN: 10 INJECTION INTRAVENOUS at 17:02

## 2020-10-08 RX ADMIN — VANCOMYCIN HYDROCHLORIDE 1000 MG: 1 INJECTION, POWDER, LYOPHILIZED, FOR SOLUTION INTRAVENOUS at 21:08

## 2020-10-08 RX ADMIN — PROPOFOL 30 MCG/KG/MIN: 10 INJECTION, EMULSION INTRAVENOUS at 15:50

## 2020-10-08 RX ADMIN — CEFEPIME HYDROCHLORIDE 2 G: 2 INJECTION, POWDER, FOR SOLUTION INTRAVENOUS at 17:41

## 2020-10-08 RX ADMIN — IPRATROPIUM BROMIDE AND ALBUTEROL SULFATE 3 ML: .5; 3 SOLUTION RESPIRATORY (INHALATION) at 20:02

## 2020-10-08 RX ADMIN — SODIUM CHLORIDE 50 ML/HR: 900 INJECTION, SOLUTION INTRAVENOUS at 18:37

## 2020-10-08 RX ADMIN — BUDESONIDE 500 MCG: 0.5 INHALANT RESPIRATORY (INHALATION) at 20:10

## 2020-10-08 NOTE — PROGRESS NOTES
[facility] Dosing Services: Antimicrobial Stewardship Progress Note  Consult for antibiotic dosing of Vancomycin by Dr. Kacey Garcia  Pharmacist reviewed antibiotic appropriateness for 80 yo female for indication of CAP  Day of Therapy: 1    Plan:  Vancomycin therapy:  Loading dose: Vancomycin 1000 mg IV x1 dose now  Maintenance dose: Vancomycin dosing per level   Dose calculated to approximate a therapeutic trough of 15-20 mcg/mL. Last trough level / Plan for level: after 3rd dose  Pharmacy to follow daily and will make changes to dose and/or frequency based on clinical status.     Non-kinetic dosing  Changed Levaquin to 750 mg IV q48hr  Changed Cefepime to 2 gm IV q12hr    Other Antimicrobial  (not dosed by pharmacist)   none   Cultures     pending   Serum Creatinine     Lab Results   Component Value Date/Time    Creatinine 1.1 (H)        Creatinine Clearance 29.8 ml/min     Procalcitonin  No results found for: PCT     Temp   98 °F (36.7 °C)    WBC   Lab Results   Component Value Date/Time    WBC 6.5 07/16/2019 04:35 AM       H/H   Lab Results   Component Value Date/Time    HGB 8.7 (L) 07/16/2019 04:35 AM      Platelets Lab Results   Component Value Date/Time    PLATELET 920 57/75/8845 04:35 AM          Pharmacist: Signed Ze Pandya Contact information: [phone]

## 2020-10-08 NOTE — CONSULTS
[facility]     Name: Vielka Harris MRN: [id number]   : 1935 Hospital: [address]   Date: 10/8/2020        Impression Plan   1. Acute respiratory failure with hypoxia and hypercapnia  2. Bilateral airspace disease concerning for PNA  3. CAD  4. Systolic heart failure  5. RA/SLE  6. History of follicular lymphoma               · Continue full vent support, goal sats 88% or greater  · ABG, CXR now  · Brovana/Pulmicort, duonebs in place of home inhalers  · Low threshold for steroidds  · Propofol, can add fentanyl  · Broad spectrum abx  · Blood, urine and sputum cultures if able  · RVP  · COVD r/o  · Lactic, troponin; trend as appropriate  · D-dimer, LDH, ferritin  · proBNP  · TTE  · Protonix in place of home PPI  · NPO         Pt is critically ill and at risk of decompensation in the setting of acute respiratory failure. Critical care time spent with pt exclusive of procedures was 40 minutes    Radiology  ( personally reviewed) CXR pending   ABG No results for input(s): PHI, PO2I, PCO2I in the last 72 hours. Subjective     Patient is a 81 y.o. female with a history of asthma/COPD, CAD, ICM, systolic heart failure with an EF of 35-40%, RA/SLE, and follicular lymphoma admitted for acute respiratory failure with hypoxia. She was recently admitted to the CCU at [facility] for an NSTEMI and newly diagnosed heart failure. During this admission she was intubated for acute hypoxic respiratory failure that sounds like it was thought to be due to volume overload. COVID was negative on . She was transferred to [facility] today for acute respiratory requiring intubation. Per report she was in respiratory distress on CPAP/BiPAP when she arrive and was intubated. CXR was concerning for bilateral airspace disease.     Outside records reviewed:  TTE : EF 35-40%  LHC negative for culprit lesions, 30% ISR of RCA  CTA 2020 negative for PE; severe CAD; mild edema with small effusions  CXR 9/13/2020: L basilar atelectasis  COVID 9/11 negative    ABG 10/8 : 7.2/68/535  CXR 10/8: bilateral infiltrates    Review of Systems:  Review of systems not obtained due to patient factors. Past Medical History:   Diagnosis Date    Asthma     Chronic obstructive pulmonary disease ([address]     Hypertension     Hypothyroid     [facility]     Non Hodgkin's lymphoma ([facility]       No past surgical history on file. Prior to Admission medications    Medication Sig Start Date End Date Taking? Authorizing Provider   Spiriva Respimat 1.25 mcg/actuation inhaler Take 2 Puffs by inhalation daily. 6/9/20   Provider, Historical   fluticasone propion-salmeteroL (Advair Diskus) 100-50 mcg/dose diskus inhaler Take 1 Puff by inhalation every twelve (12) hours. Provider, Historical   ondansetron (ZOFRAN ODT) 8 mg disintegrating tablet Take 8 mg by mouth every eight (8) hours as needed for Nausea. Provider, Historical   aclidinium bromide (TUDORZA PRESSAIR) 400 mcg/actuation inhaler Take 1 Puff by inhalation. Provider, Historical   albuterol sulfate (PROVENTIL;VENTOLIN) 2.5 mg/0.5 mL nebu nebulizer solution by Nebulization route as needed for Wheezing. Provider, Historical   MELOXICAM PO Take 15 mg by mouth daily. Provider, Historical   acetaminophen (TYLENOL) 325 mg tablet Take  by mouth every four (4) hours as needed for Pain. Provider, Historical   clopidogrel (PLAVIX) 75 mg tab Take 1 Tab by mouth daily. 7/17/19   [place] Lighter, NP   bumetanide (BUMEX) 0.5 mg tablet Take 1 Tab by mouth daily. 7/16/19   [place] Lighter, NP   hydroxychloroquine (PLAQUENIL) 200 mg tablet Take 200 mg by mouth daily. Provider, Historical   lisinopril (PRINIVIL, ZESTRIL) 40 mg tablet Take 40 mg by mouth daily. Provider, Historical   montelukast (SINGULAIR) 10 mg tablet Take 10 mg by mouth daily. Provider, Historical   omeprazole (PRILOSEC) 20 mg capsule Take 20 mg by mouth daily.     Provider, Historical budesonide-formoterol (SYMBICORT) 160-4.5 mcg/actuation HFAA Take 2 Puffs by inhalation two (2) times a day. Provider, Historical   folic acid (FOLVITE) 1 mg tablet Take 1 mg by mouth daily. oPppy Shaffer MD   multivitamin (ONE A DAY) tablet Take 1 Tab by mouth daily. Poppy Shaffer MD   aspirin 81 mg chewable tablet Take 81 mg by mouth daily. Poppy Shaffer MD   atorvastatin (LIPITOR) 20 mg tablet Take 20 mg by mouth daily. Poppy Shaffer MD   levothyroxine (SYNTHROID) 125 mcg tablet Take 125 mcg by mouth Daily (before breakfast). Provider, Historical   metoprolol succinate (TOPROL-XL) 50 mg XL tablet Take 50 mg by mouth daily. Indications: paroxysmal supraventricular tachycardia    Provider, Historical   albuterol (PROVENTIL HFA, VENTOLIN HFA, PROAIR HFA) 90 mcg/actuation inhaler Take 2 Puffs by inhalation every six (6) hours as needed for Wheezing. Poppy Shaffer MD   alendronate (FOSAMAX) 70 mg tablet Take 70 mg by mouth every seven (7) days. Poppy Shaffer MD   Calcium-Cholecalciferol, D3, (CALCIUM 600 WITH VITAMIN D3) 600 mg(1,500mg) -400 unit cap Take 1 Tab by mouth daily.  Indications: osteoporosis, a condition of weak bones    Poppy Shaffer MD     Current Facility-Administered Medications   Medication Dose Route Frequency    propofol (DIPRIVAN) 10 mg/mL infusion  0-50 mcg/kg/min IntraVENous TITRATE    fentaNYL (PF) 1,500 mcg/30 mL (50 mcg/mL) infusion  0-200 mcg/hr IntraVENous TITRATE    PHENYLephrine (RUFINA-SYNEPHRINE) 30 mg in 0.9% sodium chloride 250 mL infusion   mcg/min IntraVENous TITRATE    arformoteroL (BROVANA) neb solution 15 mcg  15 mcg Nebulization BID RT    budesonide (PULMICORT) 500 mcg/2 ml nebulizer suspension  500 mcg Nebulization BID RT    albuterol-ipratropium (DUO-NEB) 2.5 MG-0.5 MG/3 ML  3 mL Nebulization Q4H RT    [START ON 10/9/2020] pantoprazole (PROTONIX) 40 mg in 0.9% sodium chloride 10 mL injection  40 mg IntraVENous DAILY     Allergies   Allergen Reactions  Latex Rash      Social History     Tobacco Use    Smoking status: Never Smoker    Smokeless tobacco: Never Used   Substance Use Topics    Alcohol use: Never     Frequency: Never      Family History   Problem Relation Age of Onset    Heart Disease Mother           Laboratory: I have personally reviewed the critical care flowsheet and labs. No results for input(s): WBC, HGB, HCT, PLT, HGBEXT, HCTEXT, PLTEXT in the last 72 hours. No results for input(s): NA, K, CL, CO2, GLU, BUN, CREA, CA, MG, PHOS, ALB, TBIL, ALT, INR, INREXT in the last 72 hours.     No lab exists for component: SGOT    Objective:     Mode Rate Tidal Volume Pressure FiO2 PEEP            40 %       Vital Signs:     TMAX(24)      Intake/Output:   Last shift:         Last 3 shifts: 10/08 0701 - 10/08 1900  In: -   Out: 225 [Urine:225]RRIOLAST3    Intake/Output Summary (Last 24 hours) at 10/8/2020 1605  Last data filed at 10/8/2020 1530  Gross per 24 hour   Intake    Output 225 ml   Net -225 ml     EXAM:   GENERAL: well developed and in no distress, intubated , HEENT:  PERRL, EOMI, no alar flaring or epistaxis, oral mucosa moist without cyanosis, NECK:  no jugular vein distention, no retractions, , LUNGS: diminished, scattered rhonchi , HEART:  Regular rate and rhythm with no MGR; no edema is present, ABDOMEN:  soft with no tenderness, bowel sounds present, EXTREMITIES:  warm with no cyanosis, SKIN:  no jaundice or ecchymosis and NEUROLOGIC:  RASS -2    Patrick Cobian MD  [facility]

## 2020-10-08 NOTE — PROGRESS NOTES
TRANSFER - IN REPORT:    Verbal report received from [facility]) on [place]  being received from [facility](unit) for urgent transfer   Report received by Shanell Randall from [facility] RN while this writer was off unit. Report consisted of patients Situation, Background, Assessment and   Recommendations(SBAR). Information from the following report(s) SBAR, Kardex, ED Summary, Procedure Summary, Intake/Output, MAR, Recent Results, Cardiac Rhythm NSR and Alarm Parameters  was reviewed with the receiving nurse. Opportunity for questions and clarification was provided. Assessment completed upon patients arrival to unit and care assumed. 1530- Received patient via EMS from [facility]. Intubated with sedation Propofol running @ 35. Patient responsive to stimulation. PRVC mode: 40% FIO2, PEEP 8, Rate 20, . ET Tube 26 @ lip, OG tube @ 55. OG Tube clamped. Respirations even easy with ventilator. Restraints placed to B/L wrists for safety/pulling at tubes/lines. Peripherial IVs noted #20 to RAC, #20 RWrists, #22 LWrist, IJ to Left neck. No edema noted. Abdomen soft nontender nondistended. Brusing noted across lower back, above sacrum. Sacrum with blanchable redness, no opening to site. Heel boots placed to LEs. Velez catheter noted placed below level of bladder, clear yellow urine draining. Bed locked in lowest position. 80- Dr. Giselle Coughlin in to assess patient. Wants systolics >397, COVID test to be sent. Visually saw patient since placing restraints for safety. 1550- Propofol reduced to 30 to meet RASS goal of 0 to -1, current RASS -3. Holding Fentanyl, currently not needed. 1600- Mouth care and suction completed. Restraints remain in place for safety. Patient is calm, ROM performed, no skin breakdown noted. CXR completed. Systolic 99, Albert on board in case systolics remain <545.  3576- Turned to left side. Pt awakens with suction, coughs with suction.  No command following currently or tracking this writer. 1640- Left voicemail for daughter. 18- Provided Daughter Ava sanchez update and provided with 4 digit code. Daughter aware of use of restraints for safety and agrees; stated previous intubation patient was anxious. 1700- To draw labs on patient. 1745- Labs completed and sent. During completion of labs patient was becoming agitated, but calmed with therapeutic communication and touch, was calmed. 1800- Mouth care and suction completed. Restraints remain in place for safety. Patient is calm, ROM performed, no skin breakdown noted. Thick tan mucus noted, suctioned. 65- Dr. Cecily Tijerina made aware of Lactic Acid of 3.7 and BNP of 10,210, repeat Lactic in 6 hours. Put fluids NS @ 50 ml/hr, made aware of BNP, keep low rate to help reduce lactic. [address] to right side. 1900- Bedside and Verbal shift change report given to Kerri White RN (oncoming nurse) by Keara Obando RN (offgoing nurse). Report included the following information SBAR, Kardex, ED Summary, Procedure Summary, Intake/Output, MAR, Recent Results, Cardiac Rhythm NSR and Alarm Parameters . Modified order for Albert to reflect goal of systolic >467 per Dr. Jeb Kehr.

## 2020-10-08 NOTE — TELEPHONE ENCOUNTER
Patients daughter just would like to let dr. Josy Smith know that the patient is being transferred to [facility]. Patient is currently in a hospital in [place].  Thanks    Phone: [phone]

## 2020-10-08 NOTE — Clinical Note
TRANSFER - OUT REPORT:     Verbal report given to: Hong Romero. Report consisted of patient's Situation, Background, Assessment and   Recommendations(SBAR). Opportunity for questions and clarification was provided. Patient transported with a Registered Nurse. Patient transported to: [facility].

## 2020-10-08 NOTE — Clinical Note
Aborted radial procedure. Attempting femoral access now.   Unable to angiograph the left coronary arteries through the right radial.

## 2020-10-09 ENCOUNTER — APPOINTMENT (OUTPATIENT)
Dept: NON INVASIVE DIAGNOSTICS | Age: 85
DRG: 208 | End: 2020-10-09
Attending: INTERNAL MEDICINE
Payer: MEDICARE

## 2020-10-09 ENCOUNTER — APPOINTMENT (OUTPATIENT)
Dept: GENERAL RADIOLOGY | Age: 85
DRG: 208 | End: 2020-10-09
Attending: INTERNAL MEDICINE
Payer: MEDICARE

## 2020-10-09 LAB
ANION GAP SERPL CALC-SCNC: 11 MMOL/L (ref 5–15)
ARTERIAL PATENCY WRIST A: ABNORMAL
BACTERIA SPEC CULT: NORMAL
BACTERIA SPEC CULT: NORMAL
BASE DEFICIT BLDA-SCNC: 2.7 MMOL/L
BASOPHILS # BLD: 0 K/UL (ref 0–0.1)
BASOPHILS NFR BLD: 0 % (ref 0–1)
BDY SITE: ABNORMAL
BUN SERPL-MCNC: 17 MG/DL (ref 6–20)
BUN/CREAT SERPL: 14 (ref 12–20)
CALCIUM SERPL-MCNC: 8.1 MG/DL (ref 8.5–10.1)
CHLORIDE SERPL-SCNC: 100 MMOL/L (ref 97–108)
CO2 SERPL-SCNC: 22 MMOL/L (ref 21–32)
CREAT SERPL-MCNC: 1.18 MG/DL (ref 0.55–1.02)
D DIMER PPP FEU-MCNC: 2.13 MG/L FEU (ref 0–0.65)
DIFFERENTIAL METHOD BLD: ABNORMAL
ECHO AO ROOT DIAM: 3.08 CM
ECHO AR MAX VEL PISA: 333.75 CENTIMETER/SECOND
ECHO AV AREA PEAK VELOCITY: 1.4 CM2
ECHO AV AREA VTI: 1.55 CM2
ECHO AV AREA/BSA PEAK VELOCITY: 0.9 CM2/M2
ECHO AV AREA/BSA VTI: 1 CM2/M2
ECHO AV MEAN GRADIENT: 7.93 MMHG
ECHO AV PEAK GRADIENT: 16.86 MMHG
ECHO AV PEAK VELOCITY: 205.3 CM/S
ECHO AV REGURGITANT PHT: 0.59 S
ECHO AV VTI: 34.8 CM
ECHO EST RA PRESSURE: 3 MMHG
ECHO IVC PROX: 1.53 CM
ECHO LA AREA 4C: 17.56 CM2
ECHO LA MAJOR AXIS: 4.82 CM
ECHO LA MINOR AXIS: 3.12 CM
ECHO LA VOL 2C: 48.68 ML (ref 22–52)
ECHO LA VOL 4C: 48.25 ML (ref 22–52)
ECHO LA VOL BP: 52.98 ML (ref 22–52)
ECHO LA VOL/BSA BIPLANE: 34.34 ML/M2 (ref 16–28)
ECHO LA VOLUME INDEX A2C: 31.55 ML/M2 (ref 16–28)
ECHO LA VOLUME INDEX A4C: 31.27 ML/M2 (ref 16–28)
ECHO LV E' LATERAL VELOCITY: 8.94 CENTIMETER/SECOND
ECHO LV E' SEPTAL VELOCITY: 6.16 CENTIMETER/SECOND
ECHO LV INTERNAL DIMENSION DIASTOLIC: 4.5 CM (ref 3.9–5.3)
ECHO LV INTERNAL DIMENSION SYSTOLIC: 4.21 CM
ECHO LV IVSD: 0.91 CM (ref 0.6–0.9)
ECHO LV MASS 2D: 122.1 G (ref 67–162)
ECHO LV MASS INDEX 2D: 79.1 G/M2 (ref 43–95)
ECHO LV POSTERIOR WALL DIASTOLIC: 0.78 CM (ref 0.6–0.9)
ECHO LVOT DIAM: 1.96 CM
ECHO LVOT PEAK GRADIENT: 3.65 MMHG
ECHO LVOT PEAK VELOCITY: 95.49 CM/S
ECHO LVOT SV: 53.8 ML
ECHO LVOT VTI: 17.85 CM
ECHO MV A VELOCITY: 75.11 CENTIMETER/SECOND
ECHO MV AREA PHT: 4.06 CM2
ECHO MV E DECELERATION TIME (DT): 0.19 S
ECHO MV E VELOCITY: 75.56 CENTIMETER/SECOND
ECHO MV PRESSURE HALF TIME (PHT): 0.05 S
ECHO PV MAX VELOCITY: 121.84 CM/S
ECHO PV PEAK INSTANTANEOUS GRADIENT SYSTOLIC: 5.94 MMHG
ECHO RIGHT VENTRICULAR SYSTOLIC PRESSURE (RVSP): 37.99 MMHG
ECHO RV INTERNAL DIMENSION: 3.68 CM
ECHO RV TAPSE: 1.64 CM (ref 1.5–2)
ECHO TV REGURGITANT MAX VELOCITY: 295.76 CM/S
ECHO TV REGURGITANT PEAK GRADIENT: 34.99 MMHG
EOSINOPHIL # BLD: 0 K/UL (ref 0–0.4)
EOSINOPHIL NFR BLD: 0 % (ref 0–7)
EPAP/CPAP/PEEP, PAPEEP: 8
ERYTHROCYTE [DISTWIDTH] IN BLOOD BY AUTOMATED COUNT: 18.2 % (ref 11.5–14.5)
FIO2 ON VENT: 40 %
GAS FLOW.O2 SETTING OXYMISER: 20 L/MIN
GLUCOSE SERPL-MCNC: 134 MG/DL (ref 65–100)
HCO3 BLDA-SCNC: 20 MMOL/L (ref 22–26)
HCT VFR BLD AUTO: 35.8 % (ref 35–47)
HEALTH STATUS, XMCV2T: NORMAL
HGB BLD-MCNC: 11.4 G/DL (ref 11.5–16)
IMM GRANULOCYTES # BLD AUTO: 0.3 K/UL (ref 0–0.04)
IMM GRANULOCYTES NFR BLD AUTO: 1 % (ref 0–0.5)
LA VOL DISK BP: 49.41 ML (ref 22–52)
LACTATE SERPL-SCNC: 1.2 MMOL/L (ref 0.4–2)
LACTATE SERPL-SCNC: 3.4 MMOL/L (ref 0.4–2)
LACTATE SERPL-SCNC: 4 MMOL/L (ref 0.4–2)
LVOT MG: 1.9 MMHG
LYMPHOCYTES # BLD: 1.4 K/UL (ref 0.8–3.5)
LYMPHOCYTES NFR BLD: 7 % (ref 12–49)
MAGNESIUM SERPL-MCNC: 1.8 MG/DL (ref 1.6–2.4)
MCH RBC QN AUTO: 25.5 PG (ref 26–34)
MCHC RBC AUTO-ENTMCNC: 31.8 G/DL (ref 30–36.5)
MCV RBC AUTO: 80.1 FL (ref 80–99)
MONOCYTES # BLD: 1.1 K/UL (ref 0–1)
MONOCYTES NFR BLD: 5 % (ref 5–13)
NEUTS SEG # BLD: 17.7 K/UL (ref 1.8–8)
NEUTS SEG NFR BLD: 86 % (ref 32–75)
NRBC # BLD: 0 K/UL (ref 0–0.01)
NRBC BLD-RTO: 0 PER 100 WBC
PCO2 BLDA: 31 MMHG (ref 35–45)
PH BLDA: 7.44 [PH] (ref 7.35–7.45)
PHOSPHATE SERPL-MCNC: 3 MG/DL (ref 2.6–4.7)
PLATELET # BLD AUTO: 143 K/UL (ref 150–400)
PO2 BLDA: 155 MMHG (ref 80–100)
POTASSIUM SERPL-SCNC: 3.7 MMOL/L (ref 3.5–5.1)
RBC # BLD AUTO: 4.47 M/UL (ref 3.8–5.2)
SAO2 % BLD: 99 % (ref 92–97)
SAO2% DEVICE SAO2% SENSOR NAME: ABNORMAL
SARS-COV-2, COV2: NOT DETECTED
SERVICE CMNT-IMP: NORMAL
SODIUM SERPL-SCNC: 133 MMOL/L (ref 136–145)
SOURCE, COVRS: NORMAL
SPECIMEN SITE: ABNORMAL
SPECIMEN SOURCE, FCOV2M: NORMAL
SPECIMEN TYPE, XMCV1T: NORMAL
TROPONIN I SERPL-MCNC: 0.15 NG/ML
TROPONIN I SERPL-MCNC: 0.21 NG/ML
VANCOMYCIN SERPL-MCNC: 7.2 UG/ML
VENTILATION MODE VENT: ABNORMAL
VT SETTING VENT: 400 ML
WBC # BLD AUTO: 20.5 K/UL (ref 3.6–11)

## 2020-10-09 PROCEDURE — 77030005513 HC CATH URETH FOL11 MDII -B

## 2020-10-09 PROCEDURE — 77030029684 HC NEB SM VOL KT MONA -A

## 2020-10-09 PROCEDURE — 65660000000 HC RM CCU STEPDOWN

## 2020-10-09 PROCEDURE — 74011250637 HC RX REV CODE- 250/637: Performed by: INTERNAL MEDICINE

## 2020-10-09 PROCEDURE — 82803 BLOOD GASES ANY COMBINATION: CPT

## 2020-10-09 PROCEDURE — 80048 BASIC METABOLIC PNL TOTAL CA: CPT

## 2020-10-09 PROCEDURE — 94640 AIRWAY INHALATION TREATMENT: CPT

## 2020-10-09 PROCEDURE — 71045 X-RAY EXAM CHEST 1 VIEW: CPT

## 2020-10-09 PROCEDURE — 77030038269 HC DRN EXT URIN PURWCK BARD -A

## 2020-10-09 PROCEDURE — 84484 ASSAY OF TROPONIN QUANT: CPT

## 2020-10-09 PROCEDURE — 83605 ASSAY OF LACTIC ACID: CPT

## 2020-10-09 PROCEDURE — 2709999900 HC NON-CHARGEABLE SUPPLY

## 2020-10-09 PROCEDURE — 84100 ASSAY OF PHOSPHORUS: CPT

## 2020-10-09 PROCEDURE — 65610000006 HC RM INTENSIVE CARE

## 2020-10-09 PROCEDURE — 74011250636 HC RX REV CODE- 250/636: Performed by: INTERNAL MEDICINE

## 2020-10-09 PROCEDURE — 93306 TTE W/DOPPLER COMPLETE: CPT

## 2020-10-09 PROCEDURE — 94003 VENT MGMT INPAT SUBQ DAY: CPT

## 2020-10-09 PROCEDURE — 36415 COLL VENOUS BLD VENIPUNCTURE: CPT

## 2020-10-09 PROCEDURE — 74011250636 HC RX REV CODE- 250/636: Performed by: NURSE PRACTITIONER

## 2020-10-09 PROCEDURE — 74011000250 HC RX REV CODE- 250: Performed by: INTERNAL MEDICINE

## 2020-10-09 PROCEDURE — 99222 1ST HOSP IP/OBS MODERATE 55: CPT | Performed by: INTERNAL MEDICINE

## 2020-10-09 PROCEDURE — 80202 ASSAY OF VANCOMYCIN: CPT

## 2020-10-09 PROCEDURE — 85379 FIBRIN DEGRADATION QUANT: CPT

## 2020-10-09 PROCEDURE — C9113 INJ PANTOPRAZOLE SODIUM, VIA: HCPCS | Performed by: INTERNAL MEDICINE

## 2020-10-09 PROCEDURE — 85025 COMPLETE CBC W/AUTO DIFF WBC: CPT

## 2020-10-09 PROCEDURE — 83735 ASSAY OF MAGNESIUM: CPT

## 2020-10-09 PROCEDURE — 77030003560 HC NDL HUBR BARD -A

## 2020-10-09 RX ORDER — CLOPIDOGREL BISULFATE 75 MG/1
75 TABLET ORAL DAILY
Status: DISCONTINUED | OUTPATIENT
Start: 2020-10-09 | End: 2020-10-13 | Stop reason: HOSPADM

## 2020-10-09 RX ORDER — GUAIFENESIN 100 MG/5ML
81 LIQUID (ML) ORAL DAILY
Status: DISCONTINUED | OUTPATIENT
Start: 2020-10-09 | End: 2020-10-13 | Stop reason: HOSPADM

## 2020-10-09 RX ORDER — METOPROLOL TARTRATE 25 MG/1
25 TABLET, FILM COATED ORAL EVERY 12 HOURS
Status: DISCONTINUED | OUTPATIENT
Start: 2020-10-09 | End: 2020-10-13 | Stop reason: HOSPADM

## 2020-10-09 RX ORDER — FUROSEMIDE 10 MG/ML
40 INJECTION INTRAMUSCULAR; INTRAVENOUS 2 TIMES DAILY
Status: DISCONTINUED | OUTPATIENT
Start: 2020-10-09 | End: 2020-10-10

## 2020-10-09 RX ORDER — HEPARIN SODIUM 5000 [USP'U]/ML
5000 INJECTION, SOLUTION INTRAVENOUS; SUBCUTANEOUS EVERY 12 HOURS
Status: DISCONTINUED | OUTPATIENT
Start: 2020-10-09 | End: 2020-10-13 | Stop reason: HOSPADM

## 2020-10-09 RX ADMIN — BUDESONIDE 500 MCG: 0.5 INHALANT RESPIRATORY (INHALATION) at 20:46

## 2020-10-09 RX ADMIN — IPRATROPIUM BROMIDE AND ALBUTEROL SULFATE 3 ML: .5; 3 SOLUTION RESPIRATORY (INHALATION) at 09:00

## 2020-10-09 RX ADMIN — CLOPIDOGREL BISULFATE 75 MG: 75 TABLET ORAL at 14:25

## 2020-10-09 RX ADMIN — PROPOFOL 45 MCG/KG/MIN: 10 INJECTION, EMULSION INTRAVENOUS at 08:22

## 2020-10-09 RX ADMIN — PANTOPRAZOLE SODIUM 40 MG: 40 INJECTION, POWDER, FOR SOLUTION INTRAVENOUS at 08:06

## 2020-10-09 RX ADMIN — ASPIRIN 81 MG: 81 TABLET, CHEWABLE ORAL at 14:25

## 2020-10-09 RX ADMIN — CEFEPIME HYDROCHLORIDE 2 G: 2 INJECTION, POWDER, FOR SOLUTION INTRAVENOUS at 17:23

## 2020-10-09 RX ADMIN — HEPARIN SODIUM 5000 UNITS: 5000 INJECTION INTRAVENOUS; SUBCUTANEOUS at 21:27

## 2020-10-09 RX ADMIN — IPRATROPIUM BROMIDE AND ALBUTEROL SULFATE 3 ML: .5; 3 SOLUTION RESPIRATORY (INHALATION) at 04:40

## 2020-10-09 RX ADMIN — PHENYLEPHRINE HYDROCHLORIDE 15 MCG/MIN: 10 INJECTION INTRAVENOUS at 12:13

## 2020-10-09 RX ADMIN — METOPROLOL TARTRATE 25 MG: 25 TABLET, FILM COATED ORAL at 14:25

## 2020-10-09 RX ADMIN — IPRATROPIUM BROMIDE AND ALBUTEROL SULFATE 3 ML: .5; 3 SOLUTION RESPIRATORY (INHALATION) at 23:56

## 2020-10-09 RX ADMIN — FUROSEMIDE 40 MG: 10 INJECTION, SOLUTION INTRAMUSCULAR; INTRAVENOUS at 10:25

## 2020-10-09 RX ADMIN — IPRATROPIUM BROMIDE AND ALBUTEROL SULFATE 3 ML: .5; 3 SOLUTION RESPIRATORY (INHALATION) at 20:41

## 2020-10-09 RX ADMIN — BUDESONIDE 500 MCG: 0.5 INHALANT RESPIRATORY (INHALATION) at 09:14

## 2020-10-09 RX ADMIN — CEFEPIME HYDROCHLORIDE 2 G: 2 INJECTION, POWDER, FOR SOLUTION INTRAVENOUS at 05:18

## 2020-10-09 RX ADMIN — IPRATROPIUM BROMIDE AND ALBUTEROL SULFATE 3 ML: .5; 3 SOLUTION RESPIRATORY (INHALATION) at 17:02

## 2020-10-09 RX ADMIN — IPRATROPIUM BROMIDE AND ALBUTEROL SULFATE 3 ML: .5; 3 SOLUTION RESPIRATORY (INHALATION) at 01:20

## 2020-10-09 RX ADMIN — PROPOFOL 40 MCG/KG/MIN: 10 INJECTION, EMULSION INTRAVENOUS at 10:30

## 2020-10-09 RX ADMIN — HEPARIN SODIUM 5000 UNITS: 5000 INJECTION INTRAVENOUS; SUBCUTANEOUS at 14:25

## 2020-10-09 RX ADMIN — ARFORMOTEROL TARTRATE 15 MCG: 15 SOLUTION RESPIRATORY (INHALATION) at 20:46

## 2020-10-09 RX ADMIN — PROPOFOL 10 MCG/KG/MIN: 10 INJECTION, EMULSION INTRAVENOUS at 12:31

## 2020-10-09 RX ADMIN — FUROSEMIDE 40 MG: 10 INJECTION, SOLUTION INTRAMUSCULAR; INTRAVENOUS at 21:27

## 2020-10-09 RX ADMIN — PROPOFOL 40 MCG/KG/MIN: 10 INJECTION, EMULSION INTRAVENOUS at 08:01

## 2020-10-09 RX ADMIN — PHENYLEPHRINE HYDROCHLORIDE 25 MCG/MIN: 10 INJECTION INTRAVENOUS at 10:26

## 2020-10-09 RX ADMIN — ARFORMOTEROL TARTRATE 15 MCG: 15 SOLUTION RESPIRATORY (INHALATION) at 09:14

## 2020-10-09 RX ADMIN — IPRATROPIUM BROMIDE AND ALBUTEROL SULFATE 3 ML: .5; 3 SOLUTION RESPIRATORY (INHALATION) at 12:26

## 2020-10-09 NOTE — PROGRESS NOTES
Physician Progress Note      PATIENT:               Myla Ma  CSN #:                  [id number]  :                       1935  ADMIT DATE:       10/8/2020 3:14 PM  DISCH DATE:  RESPONDING  PROVIDER #:        Ze Landis MD        QUERY TEXT:    Stage of Chronic Kidney Disease: Please provide further specificity, if known. Options provided:  -- Chronic kidney disease stage 1  -- Chronic kidney disease stage 2  -- Chronic kidney disease stage 3  -- Chronic kidney disease stage 4  -- Chronic kidney disease stage 5  -- Chronic kidney disease stage 5, requiring dialysis  -- End stage renal disease        PROVIDER RESPONSE TEXT:    The patient has chronic kidney disease stage 3.       Electronically signed by:  Ze Landis MD 10/9/2020 4:47 PM

## 2020-10-09 NOTE — CONSULTS
Celia Skinner MD [facility] [address]  Office [phone]      Date of  Admission: 10/8/2020  3:14 PM       Assessment/Plan:   · Acute on chronic resp failure requiring emergent intubation: vent mgt per pulmonary. · Acute/chronic systolic CHF: decline in EF is out of proportion to cath in September. Will likely need repeat cath once extubated. Consider cMRI if cath unrevealing. repeat ECHO. Cont IV lasix. If ongoing pressor support required consider levophed. Resume GDMT when hemodynamically stable. · Recent NSTEMI (9/11) with 30 % in stent stenosis and mild stenosis distal to the stent at [facility]/No PCI  · CAD/MI s/p PCI to RCA July 2019 DAPT: (asa/plavix). Resume if OG placed. · CKD:   · Lupus:  · COPD:   · Hx lymphoma  · Leucocytosis:    · COVID PUI    Chart reviewed. Agree with advanced NP's history, exam and  A/P with changes/additons. Still requiring pressors ( being weaned down); Sedated on vent;    ECHO    D/w Dr Harriet Young ( intensivist)       Discussed with patient/nursing      Thank you for allowing us to participate in [place] care. We will be happy to follow along. Please call for any questions. Abhi Ornelas. MD, Enedelia Amaya requested by Ada Newman MD for *Acute on chronic respiratory failure with hypoxia ([facility] [J96.21]    Subjective:  [place] is a 81 y.o.   female  with PMH of CAD/NSTEMI 7/15/19, HTN, HLD, lupus, CKD, CLARISSE, COPD,  anemia who was transferred from [facility] for acute hypoxic resp failure. In [facility] she was initially placed on Bi Pap then emergently intubated. Last seen in our office in June. Per record review she was admitted to [facility] in September 11th with acute resp failure, decline in EF(35-40). She ruled in for NSTEMI (troponin peak 0.27), was intubated.  Cardiac cath showed 30% in stent stenosis of prior RCA stent and mild stenosis distal to the stent, nml LVEDP. ACE was held in setting of KYE, discharged on lasix 20 mg prn. COVID at that time was negative. LABS:   Troponin:  Peak 0.27      ProBNP:  1210      Results from Hospital Encounter encounter on 10/08/20   XR CHEST PORT    Narrative EXAM: XR CHEST PORT    INDICATION: intubated    COMPARISON: Prior day    FINDINGS: A portable AP radiograph of the chest was obtained at 0308 hours. The  patient is on a cardiac monitor. The endotracheal tube terminates approximately  2 cm above the cari. A Port-A-Cath terminates at the cavoatrial junction. The  NG tube extends below the hemidiaphragm. The lungs are hyperinflated. There is  peribronchial cuffing but no confluent infiltrate. Impression IMPRESSION: Peribronchial cuffing compatible with the bronchitis or asthma. No  infiltrate       Cardiographics:   Telemetry:  SR   EKG Results     None            Cardiac Testing/ Procedures:   PAD form completed 8/29/19    Lipids 7/16/19 - TC 91, TG 95, HDL 31, LDL 41, VLDL 91      Cardiac Testing/ Procedures:     A. Cardiac Cath/PCI:R Radial access  RCA - JR4  LCA - difficulty to access with TIG4;EBU 3.5     R CFA - micropuncture/ultrasound/fluorscopy guided access     L Main: Ca++; Ostial LM 10% ( good reflux/no dampening)     LAD: Med; MLI; D1 - MLI     LCflex: Med; MLI     RCA: Dominant  Prox 95%; Mid 50%; PDA and PLB - small; PLB branch diffuse severe dz        LVEDP:  15     LVEF: Not assessed; Nml by echo     No significant gradient across aortic valve.     PCI: JR4 guide ( with SH)  BMW wire  Pre dil with 2 by 12  VINOD 2.5 by 26  Post dil with 2.75 by 12 ( at high merary - approx 3mm)  GERALD 3 before and after           Specimens Removed : None     Closure Device: TR Band - R radial     R CFA - mynx     9/11/2020 [facility] negative for culprit lesions, 30% ISR of RCA      B. ECHO/ANUDREA: 7//15/19 - EF 56-60%; Mild AR, TR, MT, MR. Mod Pulm HTN   TTE 9/11: EF 35-40%  C. StressNuclear/Stress ECHO/Stress test:     D. Vascular:     E. EP:     F. Miscellaneous:      CTA 9/11/2020 negative for PE; severe CAD; mild edema with small effusions  CXR 9/13/2020: L basilar atelectasis  COVID 9/11 negative         Patient Active Problem List    Diagnosis Date Noted    Acute on chronic [facility] failure with hypoxia ([facility] 10/08/2020    Coronary artery disease of native artery of native heart with stable angina pectoris ([facility] 07/16/2019    HTN (hypertension), benign 07/16/2019    NSTEMI (non-ST elevated myocardial infarction) ([facility] 07/15/2019    Lupus ([facility] 07/15/2019    Asthma 07/15/2019    Hypothyroid 07/15/2019      Past Medical History:   Diagnosis Date    Asthma     Chronic obstructive pulmonary disease ([facility]     Hypertension     Hypothyroid     Lupus ([facility]     Non Hodgkin's lymphoma ([facility]       No past surgical history on file.   Allergies   Allergen Reactions    Latex Rash      Current Facility-Administered Medications   Medication Dose Route Frequency    propofol (DIPRIVAN) 10 mg/mL infusion  0-50 mcg/kg/min IntraVENous TITRATE    fentaNYL (PF) 1,500 mcg/30 mL (50 mcg/mL) infusion  0-200 mcg/hr IntraVENous TITRATE    arformoteroL (BROVANA) neb solution 15 mcg  15 mcg Nebulization BID RT    budesonide (PULMICORT) 500 mcg/2 ml nebulizer suspension  500 mcg Nebulization BID RT    albuterol-ipratropium (DUO-NEB) 2.5 MG-0.5 MG/3 ML  3 mL Nebulization Q4H RT    pantoprazole (PROTONIX) 40 mg in 0.9% sodium chloride 10 mL injection  40 mg IntraVENous DAILY    0.9% sodium chloride infusion  50 mL/hr IntraVENous CONTINUOUS    Vancomycin dosing per levels    Other Rx Dosing/Monitoring    levoFLOXacin (LEVAQUIN) 750 mg in D5W IVPB  750 mg IntraVENous Q48H    cefepime (MAXIPIME) 2 g in sterile water (preservative free) 10 mL IV syringe  2 g IntraVENous Q12H    PHENYLephrine (RUFINA-SYNEPHRINE) 30 mg in 0.9% sodium chloride 250 mL infusion   mcg/min IntraVENous TITRATE    levothyroxine (SYNTHROID) injection 125 mcg  125 mcg IntraVENous DAILY          Review of Symptoms: unable 2/2 intubation/sedation   Constitutional:   ENT: negative   Respiratory:   Gastrointestinal:   Genitourinary:   Musculoskeletal:  Neurological:     Social History     Socioeconomic History    Marital status:      Spouse name: Not on file    Number of children: Not on file    Years of education: Not on file    Highest education level: Not on file   Tobacco Use    Smoking status: Never Smoker    Smokeless tobacco: Never Used   Substance and Sexual Activity    Alcohol use: Never     Frequency: Never     Family History   Problem Relation Age of Onset    Heart Disease Mother          Physical Exam  Examined from ICU door 2/2 COVID PUI   Visit Vitals  BP (!) 125/53 (BP 1 Location: Left arm)   Pulse 77   Temp 98.3 °F (36.8 °C)   Resp 20   Ht 5' 2\" (1.575 m)   Wt 54.8 kg (120 lb 13 oz)   SpO2 100%   BMI 22.10 kg/m²     Limited - COVID PUI    General: sedated, elderly,frail   HEENT Exam:     Normocephalic, atraumatic. . Orally intubated    Neck: supple  Lung Exam:      Respirations unlabored. O2 @ 100 % FIO2 40 %  Sat:  . Lungs:  Deferred      Heart Exam:       Tele: SR          Abdomen Exam:      deferred   :  Velez    Extremities Exam:      Atraumatic.  No edema   Vascular Exam:      deferred   Neuro exam:  sedated  Psy Exam: sedated         Recent Results (from the past 12 hour(s))   TROPONIN I    Collection Time: 10/08/20 11:09 PM   Result Value Ref Range    Troponin-I, Qt. 0.21 (H) <0.05 ng/mL   LACTIC ACID    Collection Time: 10/08/20 11:30 PM   Result Value Ref Range    Lactic acid 4.0 (HH) 0.4 - 2.0 MMOL/L   TROPONIN I    Collection Time: 10/09/20  5:12 AM   Result Value Ref Range    Troponin-I, Qt. 0.15 (H) <0.05 ng/mL   CBC WITH AUTOMATED DIFF    Collection Time: 10/09/20  5:12 AM   Result Value Ref Range    WBC 20.5 (H) 3.6 - 11.0 K/uL    RBC 4.47 3.80 - 5.20 M/uL    HGB 11.4 (L) 11.5 - 16.0 g/dL    HCT 35.8 35.0 - 47.0 %    MCV 80.1 80.0 - 99.0 FL    MCH 25.5 (L) 26.0 - 34.0 PG    MCHC 31.8 30.0 - 36.5 g/dL    RDW 18.2 (H) 11.5 - 14.5 %    PLATELET 746 (L) 859 - 400 K/uL    NRBC 0.0 0  WBC    ABSOLUTE NRBC 0.00 0.00 - 0.01 K/uL    NEUTROPHILS 86 (H) 32 - 75 %    LYMPHOCYTES 7 (L) 12 - 49 %    MONOCYTES 5 5 - 13 %    EOSINOPHILS 0 0 - 7 %    BASOPHILS 0 0 - 1 %    IMMATURE GRANULOCYTES 1 (H) 0.0 - 0.5 %    ABS. NEUTROPHILS 17.7 (H) 1.8 - 8.0 K/UL    ABS. LYMPHOCYTES 1.4 0.8 - 3.5 K/UL    ABS. MONOCYTES 1.1 (H) 0.0 - 1.0 K/UL    ABS. EOSINOPHILS 0.0 0.0 - 0.4 K/UL    ABS. BASOPHILS 0.0 0.0 - 0.1 K/UL    ABS. IMM.  GRANS. 0.3 (H) 0.00 - 0.04 K/UL    DF AUTOMATED     METABOLIC PANEL, BASIC    Collection Time: 10/09/20  5:12 AM   Result Value Ref Range    Sodium 133 (L) 136 - 145 mmol/L    Potassium 3.7 3.5 - 5.1 mmol/L    Chloride 100 97 - 108 mmol/L    CO2 22 21 - 32 mmol/L    Anion gap 11 5 - 15 mmol/L    Glucose 134 (H) 65 - 100 mg/dL    BUN 17 6 - 20 MG/DL    Creatinine 1.18 (H) 0.55 - 1.02 MG/DL    BUN/Creatinine ratio 14 12 - 20      GFR est AA 53 (L) >60 ml/min/1.73m2    GFR est non-AA 44 (L) >60 ml/min/1.73m2    Calcium 8.1 (L) 8.5 - 10.1 MG/DL   MAGNESIUM    Collection Time: 10/09/20  5:12 AM   Result Value Ref Range    Magnesium 1.8 1.6 - 2.4 mg/dL   PHOSPHORUS    Collection Time: 10/09/20  5:12 AM   Result Value Ref Range    Phosphorus 3.0 2.6 - 4.7 MG/DL   D DIMER    Collection Time: 10/09/20  5:12 AM   Result Value Ref Range    D-dimer 2.13 (H) 0.00 - 0.65 mg/L FEU   LACTIC ACID    Collection Time: 10/09/20  5:45 AM   Result Value Ref Range    Lactic acid 3.4 (HH) 0.4 - 2.0 MMOL/L   BLOOD GAS, ARTERIAL    Collection Time: 10/09/20  6:50 AM   Result Value Ref Range    pH 7.44 7.35 - 7.45      PCO2 31 (L) 35.0 - 45.0 mmHg    PO2 155 (H) 80 - 100 mmHg    O2 SAT 99 (H) 92 - 97 %    BICARBONATE 20 (L) 22 - 26 mmol/L    BASE DEFICIT 2.7 mmol/L    O2 METHOD VENTILATOR      FIO2 40 %    MODE Pressure regulated volume control      Tidal volume 400      SET RATE 20      EPAP/CPAP/PEEP 8      Sample source ARTERIAL      SITE RB      RADHA'S TEST N/A         Oracio Coe MS [facility]

## 2020-10-09 NOTE — WOUND CARE
Wound care consult- intubated patient - ICU- covid isolation. On  Low air loss surface- off loading boots in use. Skin care preventative measures ordered Will follow LINDA Valdivia

## 2020-10-09 NOTE — PROGRESS NOTES
[facility]     Name: Sarabjit Jara MRN: [id number]   : 1935 Hospital: [facility]   Date: 10/9/2020        Impression Plan   1. Acute respiratory failure with hypoxia and hypercapnia  2. Bilateral airspace disease initially concerning for PNA- resolved  3. CAD  4. Systolic heart failure  5. RA/SLE  6. History of follicular lymphoma               · Wean sedation and will do SBT this AM.  · Brovana/Pulmicort, duonebs in place of home inhalers  · Low threshold for steroidds- no wheezing currently  · Wean Propofol/ fentanyl  · Broad spectrum abx- vanc/cefepime/levofloxacin  · Blood cx NGTD  · UA negative  · sputum cultures NGTD  · RVP still pending  · COVD 19 pending  · Serial Lactic acid,   · Troponin negative  · D-dimer 2.13  · proBNP  · TTE  · Protonix in place of home PPI  · NPO         Pt is critically ill and at risk of decompensation in the setting of acute respiratory failure. Critical care time spent with pt exclusive of procedures was 32 minutes    Radiology  ( personally reviewed) CXR pending   ABG No results for input(s): PHI, PO2I, PCO2I in the last 72 hours. Subjective     Patient is a 81 y.o. female with a history of asthma/COPD, CAD, ICM, systolic heart failure with an EF of 35-40%, RA/SLE, and follicular lymphoma admitted for acute respiratory failure with hypoxia. She was recently admitted to the CCU at [facility] for an NSTEMI and newly diagnosed heart failure. During this admission she was intubated for acute hypoxic respiratory failure that sounds like it was thought to be due to volume overload. COVID was negative on . She was transferred to [facility] today for acute respiratory requiring intubation. Per report she was in respiratory distress on CPAP/BiPAP when she arrive and was intubated. CXR was concerning for bilateral airspace disease.     Outside records reviewed:  TTE : EF 35-40%  LHC negative for culprit lesions, 30% ISR of RCA  CTA 9/11/2020 negative for PE; severe CAD; mild edema with small effusions  CXR 9/13/2020: L basilar atelectasis  COVID 9/11 negative    ABG 10/8 : 7.2/68/535  CXR 10/8: bilateral infiltrates    Overnight events: Following commands on propofol 45  Albert 25 mcg/min  On FiO2 40%        Past Medical History:   Diagnosis Date    Asthma     Chronic obstructive pulmonary disease (HCC)     Hypertension     Hypothyroid     Lupus ([facility]     Non Hodgkin's lymphoma ([facility]       No past surgical history on file. Prior to Admission medications    Medication Sig Start Date End Date Taking? Authorizing Provider   Spiriva Respimat 1.25 mcg/actuation inhaler Take 2 Puffs by inhalation daily. 6/9/20   Provider, Historical   fluticasone propion-salmeteroL (Advair Diskus) 100-50 mcg/dose diskus inhaler Take 1 Puff by inhalation every twelve (12) hours. Provider, Historical   ondansetron (ZOFRAN ODT) 8 mg disintegrating tablet Take 8 mg by mouth every eight (8) hours as needed for Nausea. Provider, Historical   aclidinium bromide (TUDORZA PRESSAIR) 400 mcg/actuation inhaler Take 1 Puff by inhalation. Provider, Historical   albuterol sulfate (PROVENTIL;VENTOLIN) 2.5 mg/0.5 mL nebu nebulizer solution by Nebulization route as needed for Wheezing. Provider, Historical   MELOXICAM PO Take 15 mg by mouth daily. Provider, Historical   acetaminophen (TYLENOL) 325 mg tablet Take  by mouth every four (4) hours as needed for Pain. Provider, Historical   clopidogrel (PLAVIX) 75 mg tab Take 1 Tab by mouth daily. 7/17/19   Ozzie Orr, ROSI   bumetanide (BUMEX) 0.5 mg tablet Take 1 Tab by mouth daily. 7/16/19   Ozzie Orr NP   hydroxychloroquine (PLAQUENIL) 200 mg tablet Take 200 mg by mouth daily. Provider, Historical   lisinopril (PRINIVIL, ZESTRIL) 40 mg tablet Take 40 mg by mouth daily. Provider, Historical   montelukast (SINGULAIR) 10 mg tablet Take 10 mg by mouth daily.     Provider, Historical   omeprazole (PRILOSEC) 20 mg capsule Take 20 mg by mouth daily. Provider, Historical   budesonide-formoterol (SYMBICORT) 160-4.5 mcg/actuation HFAA Take 2 Puffs by inhalation two (2) times a day. Provider, Historical   folic acid (FOLVITE) 1 mg tablet Take 1 mg by mouth daily. Poppy Shaffer MD   multivitamin (ONE A DAY) tablet Take 1 Tab by mouth daily. Poppy Shaffer MD   aspirin 81 mg chewable tablet Take 81 mg by mouth daily. Poppy Shaffer MD   atorvastatin (LIPITOR) 20 mg tablet Take 20 mg by mouth daily. Poppy Shaffer MD   levothyroxine (SYNTHROID) 125 mcg tablet Take 125 mcg by mouth Daily (before breakfast). Provider, Historical   metoprolol succinate (TOPROL-XL) 50 mg XL tablet Take 50 mg by mouth daily. Indications: paroxysmal supraventricular tachycardia    Provider, Historical   albuterol (PROVENTIL HFA, VENTOLIN HFA, PROAIR HFA) 90 mcg/actuation inhaler Take 2 Puffs by inhalation every six (6) hours as needed for Wheezing. Poppy Shaffer MD   alendronate (FOSAMAX) 70 mg tablet Take 70 mg by mouth every seven (7) days. Poppy Shaffer MD   Calcium-Cholecalciferol, D3, (CALCIUM 600 WITH VITAMIN D3) 600 mg(1,500mg) -400 unit cap Take 1 Tab by mouth daily.  Indications: osteoporosis, a condition of weak bones    Poppy Shaffer MD     Current Facility-Administered Medications   Medication Dose Route Frequency    furosemide (LASIX) injection 40 mg  40 mg IntraVENous BID    propofol (DIPRIVAN) 10 mg/mL infusion  0-50 mcg/kg/min IntraVENous TITRATE    fentaNYL (PF) 1,500 mcg/30 mL (50 mcg/mL) infusion  0-200 mcg/hr IntraVENous TITRATE    arformoteroL (BROVANA) neb solution 15 mcg  15 mcg Nebulization BID RT    budesonide (PULMICORT) 500 mcg/2 ml nebulizer suspension  500 mcg Nebulization BID RT    albuterol-ipratropium (DUO-NEB) 2.5 MG-0.5 MG/3 ML  3 mL Nebulization Q4H RT    pantoprazole (PROTONIX) 40 mg in 0.9% sodium chloride 10 mL injection  40 mg IntraVENous DAILY    0.9% sodium chloride infusion 50 mL/hr IntraVENous CONTINUOUS    Vancomycin dosing per levels    Other Rx Dosing/Monitoring    levoFLOXacin (LEVAQUIN) 750 mg in D5W IVPB  750 mg IntraVENous Q48H    cefepime (MAXIPIME) 2 g in sterile water (preservative free) 10 mL IV syringe  2 g IntraVENous Q12H    PHENYLephrine (RUFINA-SYNEPHRINE) 30 mg in 0.9% sodium chloride 250 mL infusion   mcg/min IntraVENous TITRATE     Allergies   Allergen Reactions    Latex Rash      Social History     Tobacco Use    Smoking status: Never Smoker    Smokeless tobacco: Never Used   Substance Use Topics    Alcohol use: Never     Frequency: Never      Family History   Problem Relation Age of Onset    Heart Disease Mother           Laboratory: I have personally reviewed the critical care flowsheet and labs. Recent Labs     10/09/20  0512 10/08/20  1740   WBC 20.5* 21.4*   HGB 11.4* 10.7*   HCT 35.8 34.4*   * 130*     Recent Labs     10/09/20  0512 10/08/20  1740   * 133*   K 3.7 3.6    100   CO2 22 21   * 172*   BUN 17 13   CREA 1.18* 1.11*   CA 8.1* 7.7*   MG 1.8 1.8   PHOS 3.0 2.9       Objective:     Mode Rate Tidal Volume Pressure FiO2 PEEP   PRVC   400 ml    40 % 8 cm H20     Vital Signs:    Ventilator Pressures  PIP Observed (cm H2O): 24 cm H2O  Plateau Pressure (cm H2O): 18 cm H2O  MAP (cm H2O): 12  PEEP/VENT (cm H2O): 8 cm H20  Auto PEEP Observed (cm H2O): 7.5 cm [id number](24)Ventilator Pressures  PIP Observed (cm H2O): 24 cm H2O  Plateau Pressure (cm H2O): 18 cm H2O  MAP (cm H2O): 12  PEEP/VENT (cm H2O): 8 cm H20  Auto PEEP Observed (cm H2O): 7.5 cm H2O  Safety & Alarms  Circuit Temperature: (HME)  Backup Mode Checked/Apnea: Yes  Pressure Max: 40 cm H2O  Ve Min: 2  Ve Max: 22  RR Min: 5  RR Max: 40  Intake/Output:   Last shift:      Ventilator Volumes  Vt Set (ml): 400 ml  Vt Exhaled (Machine Breath) (ml): 388 ml  Ve Observed (l/min): 8.1 l/min  Last 3 shifts: No intake/output data recorded. [id number]    Intake/Output Summary (Last 24 hours) at 10/9/2020 0951  Last data filed at 10/9/2020 0700  Gross per 24 hour   Intake 1383.42 ml   Output 2775 ml   Net -1391.58 ml     EXAM:   GENERAL: well developed and in no distress, intubated , HEENT:  PERRL, EOMI, no alar flaring or epistaxis, oral mucosa moist without cyanosis, NECK:  no jugular vein distention, no retractions, , LUNGS: diminished, scattered rhonchi , HEART:  Regular rate and rhythm with no MGR; no edema is present, ABDOMEN:  soft with no tenderness, bowel sounds present, EXTREMITIES:  warm with no cyanosis, SKIN:  no jaundice or ecchymosis and NEUROLOGIC:  RASS 0, following commands    Alberta Phoenix MD  [facility]

## 2020-10-09 NOTE — PROGRESS NOTES
Reason for Readmission:  Acute Respiratory failure with hypoxia and hypercapnia,PUI on admission   . septic shock,acute on chronic CHF,bilateral pneumonia    Previous admissions-[facility] left heart cardiac cath on 9/14/20 with Dr Lazara Odronez([phone],Pt was [facility] to [facility] from 9/11/20 to 9/17/20 on their [address] admission diagnosis there was acute hypoxemic respiratory failure due to NSTEMI and heart failure exacerbation    Pt was a direct admission from [facility] ED yesterday and was intubated prior to being admitted to [address] [place] [address] ICU. RUR Score/Risk Level:     12%    PCP: First and Last name:  Dr Clem Hayes   Name of Practice: [facility] clinic   Are you a current patient: Yes/No: yes   Approximate date of last visit: October 1,2020   Can you participate in a virtual visit with your PCP: only with daughter's assistance    Is a Care Conference indicated: not @ this time      Did you attend your follow up appointment (s): If not, why not: Pt was a direct admission from [facility]         Resources/supports as identified by patient/family:    Supportive family          Top Challenges facing patient (as identified by patient/family and CM): Finances/Medication cost?    Pt has medicare & medicaid   Transportation      Daughter or medicaid  Support system or lack thereof? Daughter ,son,family  Living arrangements? By herself   Self-care/ADLs/Cognition? ???        Current Advanced Directive/Advance Care Plan:             Plan for utilizing home health: To be determined             Transition of Care Plan:    Based on readmission, the patient's previous Plan of Care   has been evaluated and/or modified. The current Transition of Care Plan is:         Katherine Carlos is pt.'s primary decision-maker. Her number is [phone]. Daughter typically transports pt to & from her medical appointments.   Previously pt was open to [facility] in [facility] for skilled nursing and .home PT If needed,their phone number is [phone] and their fax number is [phone]. [facility] by herself. Family lives near pt. Her daughter lives in [place] close to pt in [facility]. DME pt has @ home includes a nebulizer ,rollator,and standard walker. Family reported that pt was mowing her grass prior to being hospitalized @ [facility]. Pt has medicare and medicaid. She has her medications filled @ [facility] [address] [place] [address] [place] @ their  [address], [address] . Pt also sees Dr Tenisha Ngo ,[place] @ the [facility] [address] clinic. Past medical history includes NSTEMI,VINOD to RCA in 2019,COPD(no home oxygen),lupus,hyperlipidemia and hypothyroidism. Pt also has a history of follicular lymphoma. [facility] following [facility] up    Dr Oleg Harris -10/23/20 @ 1:20 pm  [facility] pulmonology-January 15th ,2021  PCP-October 1 ,2020 @ noon. Dr Libby Corbin-11/11/20 @ 1:15 pm  Ac Olson 11/17/20 @ 10:45 am  Lab work @ [facility] cancer center 1/5/21 @ 12:15 pm  Dr Viral Alonzo-1/5/2021 12:30 pm-heme/onc  Dr Donald Falling 1/11/2021 11:00 am    I will add these appointments to pt.'s AVS.    Pt remains intubated and sedated. Pt is on lila gtt,propofol and fentanyl gtts. Covid 19 is negative. Case Management will continue to follow pt for discharge needs. Please consider a Palliative Medicine consult.     [address]    Readmission Assessment  Number of days since last admission?: 8-30 days(22 days since being [place] [facility] [place] to 9/17/20)  Previous disposition: Other (comment)([facility] discharge only specifies outpt follow-up with physicians)  What was the patient's/caregiver's perception as to why they think they needed to return back to the hospital?: Other (Comment)(pt is intubated so I cannot assess her perception,will call daughter)  Did you visit your Primary Care Physician after you left the hospital, before you returned this time?: Yes  Did you see a specialist, such as Cardiac, Pulmonary, Orthopedic Physician, etc. after you left the hospital?: Other (Comment)(specialists appointmemnts are in my note)  Who advised the patient to return to the hospital?: Caregiver(daughter found her mother)  Does the patient report anything that got in the way of taking their medications?: No(intubated)  In our efforts to provide the best possible care to you and others like you, can you think of anything that we could have done to help you after you left the hospital the first time, so that you might not have needed to return so soon?: Arrange for more help when leaving the hospital, Identify patient's health literacy needs(These are projections not actual discussions with pt as she is intubated)

## 2020-10-09 NOTE — PROGRESS NOTES
Qasim Goss [facility] 79  [address] [facility], [place], [address] [place] [address]  [phone]      Medical Progress Note      NAME: Natalya Issa   :  1935  MRM:  [id number]    Date of service: 10/9/2020  1:54 PM       Assessment and Plan:   1. Acute respiratory failure with hypoxia and hypercapnea. On vent support per pulmonary. 2.  Abnormal CXR. ? Pneumonia. Repeated CXR showed resolution of infiltrate. Continue ABx for now. SARS-CoV-2 is negative. Check cultures      3. Acute on chronic systolic CHF. Continue diuretic IV. Planned for cardiac cath on Monday. Cardiac MRI if cath unrevealing. Check echo. 4.  Elevated troponin/ recent NSTEMI/ CAD s/p PCIin 2019. On ASA and plavix. palnned for cardiac cath on Monday      5. Elevated lactic acid. Continue ABx and trend.      6. SLE: will resume hydroxychloroquine when taking PO     7.  Hypothyroidism:  Continue synthroid. Check TSH     8. HTN: holding home BP meds due to hypotension. Is on metoprolol, lisinopril at home.               Subjective:     Chief Complaint[de-identified] Patient was seen and examined as a follow up for acute respiratory failure. Chart was reviewed. intubated and sedated. ROS:  (bold if positive, if negative)    Tolerating PT  Tolerating Diet        Objective:     Last 24hrs VS reviewed since prior progress note.  Most recent are:    Visit Vitals  BP (!) 128/54   Pulse (!) 111   Temp 98.7 °F (37.1 °C)   Resp 12   Ht 5' 2\" (1.575 m)   Wt 54.8 kg (120 lb 13 oz)   SpO2 100%   BMI 22.10 kg/m²     SpO2 Readings from Last 6 Encounters:   10/09/20 100%   20 98%   19 98%   19 97%   19 98%   19 97%            Intake/Output Summary (Last 24 hours) at 10/9/2020 1354  Last data filed at 10/9/2020 1300  Gross per 24 hour   Intake 1383.42 ml   Output 4375 ml   Net -2991.58 ml        Physical Exam:    Gen:  Well-developed, well-nourished, in no acute distress  HEENT:  Pink conjunctivae, PERRL, hearing intact to voice, moist mucous membranes  Neck:  Supple, without masses, thyroid non-tender  Resp:  No accessory muscle use, clear breath sounds without wheezes rales or rhonchi  Card:  No murmurs, normal S1, S2 without thrills, bruits or peripheral edema  Abd:  Soft, non-tender, non-distended, normoactive bowel sounds are present, no palpable organomegaly and no detectable hernias  Lymph:  No cervical or inguinal adenopathy  Musc:  No cyanosis or clubbing  Skin:  No rashes or ulcers, skin turgor is good  Neuro:   Intubated and sedated  __________________________________________________________________  Medications Reviewed: (see below)  Medications:     Current Facility-Administered Medications   Medication Dose Route Frequency    furosemide (LASIX) injection 40 mg  40 mg IntraVENous BID    Vancomycin - Please draw Random level tonight at 2100.  Thank you!!   Other ONCE    heparin (porcine) injection 5,000 Units  5,000 Units SubCUTAneous Q12H    aspirin chewable tablet 81 mg  81 mg Oral DAILY    clopidogreL (PLAVIX) tablet 75 mg  75 mg Oral DAILY    propofol (DIPRIVAN) 10 mg/mL infusion  0-50 mcg/kg/min IntraVENous TITRATE    fentaNYL (PF) 1,500 mcg/30 mL (50 mcg/mL) infusion  0-200 mcg/hr IntraVENous TITRATE    arformoteroL (BROVANA) neb solution 15 mcg  15 mcg Nebulization BID RT    budesonide (PULMICORT) 500 mcg/2 ml nebulizer suspension  500 mcg Nebulization BID RT    albuterol-ipratropium (DUO-NEB) 2.5 MG-0.5 MG/3 ML  3 mL Nebulization Q4H RT    pantoprazole (PROTONIX) 40 mg in 0.9% sodium chloride 10 mL injection  40 mg IntraVENous DAILY    Vancomycin dosing per levels    Other Rx Dosing/Monitoring    levoFLOXacin (LEVAQUIN) 750 mg in D5W IVPB  750 mg IntraVENous Q48H    cefepime (MAXIPIME) 2 g in sterile water (preservative free) 10 mL IV syringe  2 g IntraVENous Q12H    PHENYLephrine (RUFINA-SYNEPHRINE) 30 mg in 0.9% sodium chloride 250 mL infusion   mcg/min IntraVENous TITRATE        Lab Data Reviewed: (see below)  Lab Review:     Recent Labs     10/09/20  0512 10/08/20  1740   WBC 20.5* 21.4*   HGB 11.4* 10.7*   HCT 35.8 34.4*   * 130*     Recent Labs     10/09/20  0512 10/08/20  1740   * 133*   K 3.7 3.6    100   CO2 22 21   * 172*   BUN 17 13   CREA 1.18* 1.11*   CA 8.1* 7.7*   MG 1.8 1.8   PHOS 3.0 2.9     No results found for: GLUCPOC  Recent Labs     10/09/20  0650 10/08/20  1640   PH 7.44 7.37   PCO2 31* 36   PO2 155* 129*   HCO3 20* 20*   FIO2 40 40     No results for input(s): INR, INREXT in the last 72 hours. All Micro Results     Procedure Component Value Units Date/Time    CULTURE, RESPIRATORY/SPUTUM/BRONCH Lorel Mandel STAIN [[id number]]  (Abnormal) Collected:  10/08/20 1749    Order Status:  Completed Specimen:  Sputum,ET Suction Updated:  10/09/20 1343     Special Requests: NO SPECIAL REQUESTS        GRAM STAIN OCCASIONAL WBCS SEEN               OCCASIONAL EPITHELIAL CELLS SEEN            1+ GRAM NEGATIVE RODS               OCCASIONAL GRAM POSITIVE COCCI IN PAIRS                  OCCASIONAL GRAM NEGATIVE DIPLOCOCCI           Culture result: LIGHT GRAM NEGATIVE RODS               LIGHT NORMAL RESPIRATORY JAMES SO FAR          CULTURE, BLOOD, PAIRED [[id number]] Collected:  10/08/20 1652    Order Status:  Completed Specimen:  Blood Updated:  10/09/20 1222     Special Requests: NO SPECIAL REQUESTS        Culture result: NO GROWTH AFTER 16 HOURS       CULTURE, MRSA [[id number]] Collected:  10/08/20 1611    Order Status:  Completed Specimen:  Nasal from Nares Updated:  10/08/20 2035    RESPIRATORY PANEL,PCR,NASOPHARYNGEAL [[id number]]     Order Status:  Sent Specimen:  NASOPHARYNGEAL SWAB           I have reviewed notes of prior 24hr. Other pertinent lab:      Total time spent with patient: [id number] discussed with: Patient, Nursing Staff and >50% of time spent in counseling and coordination of care    Discussed:  Care Plan    Prophylaxis:  Hep SQ    Disposition:  Home w/Family           ___________________________________________________    Attending Physician: Trey Rob MD

## 2020-10-09 NOTE — H&P
[address] Adult  Hospitalist Group    History & Physical    Date of service: 10/8/2020    Patient name: Elvira Duran  MRN: [id number]  YOB: 1935  Age: 81 y.o. Primary care provider:  Pallavi Hand MD     Source of Information: patient, medical records                              Chief complain: shortness of breath    History of present illness  Elvira Duran is a 81 y.o. female who presented with shortness of breath. Patient is currently intubated and unable to give history, but according to her chart, she developed sudden onset shortness of breath this morning, and was taken to [facility] ([place]). She was found to be hypoxic and started on bipap, but did not improve so was emergently intubated. Past Medical History:   Diagnosis Date    Asthma     Chronic obstructive pulmonary disease ([facility]     Hypertension     Hypothyroid     Lupus ([facility]     Non Hodgkin's lymphoma ([facility]       No past surgical history on file. Prior to Admission medications    Medication Sig Start Date End Date Taking? Authorizing Provider   Spiriva Respimat 1.25 mcg/actuation inhaler Take 2 Puffs by inhalation daily. 6/9/20   Provider, Historical   fluticasone propion-salmeteroL (Advair Diskus) 100-50 mcg/dose diskus inhaler Take 1 Puff by inhalation every twelve (12) hours. Provider, Historical   ondansetron (ZOFRAN ODT) 8 mg disintegrating tablet Take 8 mg by mouth every eight (8) hours as needed for Nausea. Provider, Historical   aclidinium bromide (TUDORZA PRESSAIR) 400 mcg/actuation inhaler Take 1 Puff by inhalation. Provider, Historical   albuterol sulfate (PROVENTIL;VENTOLIN) 2.5 mg/0.5 mL nebu nebulizer solution by Nebulization route as needed for Wheezing. Provider, Historical   MELOXICAM PO Take 15 mg by mouth daily. Provider, Historical   acetaminophen (TYLENOL) 325 mg tablet Take  by mouth every four (4) hours as needed for Pain.     Provider, Historical clopidogrel (PLAVIX) 75 mg tab Take 1 Tab by mouth daily. 7/17/19   Camden Huang NP   bumetanide (BUMEX) 0.5 mg tablet Take 1 Tab by mouth daily. 7/16/19   Camden Huang NP   hydroxychloroquine (PLAQUENIL) 200 mg tablet Take 200 mg by mouth daily. Provider, Historical   lisinopril (PRINIVIL, ZESTRIL) 40 mg tablet Take 40 mg by mouth daily. Provider, Historical   montelukast (SINGULAIR) 10 mg tablet Take 10 mg by mouth daily. Provider, Historical   omeprazole (PRILOSEC) 20 mg capsule Take 20 mg by mouth daily. Provider, Historical   budesonide-formoterol (SYMBICORT) 160-4.5 mcg/actuation HFAA Take 2 Puffs by inhalation two (2) times a day. Provider, Historical   folic acid (FOLVITE) 1 mg tablet Take 1 mg by mouth daily. Poppy Shaffer MD   multivitamin (ONE A DAY) tablet Take 1 Tab by mouth daily. Poppy Shaffer MD   aspirin 81 mg chewable tablet Take 81 mg by mouth daily. Poppy Shaffer MD   atorvastatin (LIPITOR) 20 mg tablet Take 20 mg by mouth daily. Poppy Shaffer MD   levothyroxine (SYNTHROID) 125 mcg tablet Take 125 mcg by mouth Daily (before breakfast). Provider, Historical   metoprolol succinate (TOPROL-XL) 50 mg XL tablet Take 50 mg by mouth daily. Indications: paroxysmal supraventricular tachycardia    Provider, Historical   albuterol (PROVENTIL HFA, VENTOLIN HFA, PROAIR HFA) 90 mcg/actuation inhaler Take 2 Puffs by inhalation every six (6) hours as needed for Wheezing. Poppy Shaffer MD   alendronate (FOSAMAX) 70 mg tablet Take 70 mg by mouth every seven (7) days. Poppy Shaffer MD   Calcium-Cholecalciferol, D3, (CALCIUM 600 WITH VITAMIN D3) 600 mg(1,500mg) -400 unit cap Take 1 Tab by mouth daily.  Indications: osteoporosis, a condition of weak bones    Poppy Shaffer MD     Allergies   Allergen Reactions    Latex Rash      Family History   Problem Relation Age of Onset    Heart Disease Mother          Social history    Social History     Tobacco Use   Smoking Status Never Smoker   Smokeless Tobacco Never Used       Social History     Substance and Sexual Activity   Alcohol Use Never    Frequency: Never       Code status  Code status discussed with the patient/caregivers. Prior    Review of systems    Review of systems not obtained due to patient factors. Physical Examination   Visit Vitals  BP (!) 106/38   Pulse 70   Temp 98 °F (36.7 °C)   Resp 21   Ht 5' 2\" (1.575 m)   Wt 54.8 kg (120 lb 13 oz)   SpO2 100%   BMI 22.10 kg/m²          O2 Device: Ventilator    General:  Intubated and sedated    Head:  Normocephalic, without obvious abnormality, atraumatic   Eyes:  Conjunctivae/corneas clear.  PERRL   Head/Neck: Nares normal. No nasal drainage or sinus tenderness  Lips, mucosa, and tongue normal   Teeth and gums normal  Clear oropharynx  Trachea midline  No palpable adenopathy  No thyroid enlargement, tenderness     Lungs:   Symmetrical chest expansion and respiratory effort  Clear to auscultation bilaterally       Heart:  Regular rhythm   Sounds normal; no murmur, rub or gallop   Abdomen:   Soft, no tenderness  Bowel sounds normal  No masses or hepatosplenomegaly     Extremities: Extremities normal, atraumatic  No cyanosis or edema     Skin: No rashes or ulcers   Musculo-      skeletal: Unable to examine   Neuro: Unable to examine           Data Review    24 Hour Results:  Recent Results (from the past 24 hour(s))   SARS-COV-2    Collection Time: 10/08/20  4:11 PM   Result Value Ref Range    Specimen source Nasopharyngeal      SARS-CoV-2 PENDING     SARS-CoV-2 PENDING     Specimen source Nasopharyngeal      COVID-19 rapid test PENDING     Specimen type NP Swab      Health status PENDING     COVID-19 PENDING    BLOOD GAS, ARTERIAL    Collection Time: 10/08/20  4:40 PM   Result Value Ref Range    pH 7.37 7.35 - 7.45      PCO2 36 35.0 - 45.0 mmHg    PO2 129 (H) 80 - 100 mmHg    O2 SAT 99 (H) 92 - 97 %    BICARBONATE 20 (L) 22 - 26 mmol/L    BASE DEFICIT 4.3 mmol/L    O2 METHOD VENTILATOR      FIO2 40 %    MODE A/C      Tidal volume 400      SET RATE 20      High PEEP 8      Sample source ARTERIAL      SITE LEFT RADIAL      RADHA'S TEST YES     URINALYSIS W/ REFLEX CULTURE    Collection Time: 10/08/20  4:52 PM    Specimen: Urine   Result Value Ref Range    Color YELLOW/STRAW      Appearance CLEAR CLEAR      Specific gravity 1.014 1.003 - 1.030      pH (UA) 5.5 5.0 - 8.0      Protein TRACE (A) NEG mg/dL    Glucose Negative NEG mg/dL    Ketone Negative NEG mg/dL    Bilirubin Negative NEG      Blood Negative NEG      Urobilinogen 0.2 0.2 - 1.0 EU/dL    Nitrites Negative NEG      Leukocyte Esterase Negative NEG      WBC 0-4 0 - 4 /hpf    RBC 0-5 0 - 5 /hpf    Epithelial cells FEW FEW /lpf    Bacteria Negative NEG /hpf    UA:UC IF INDICATED CULTURE NOT INDICATED BY UA RESULT CNI      Hyaline cast 2-5 0 - 5 /lpf   METABOLIC PANEL, BASIC    Collection Time: 10/08/20  5:40 PM   Result Value Ref Range    Sodium 133 (L) 136 - 145 mmol/L    Potassium 3.6 3.5 - 5.1 mmol/L    Chloride 100 97 - 108 mmol/L    CO2 21 21 - 32 mmol/L    Anion gap 12 5 - 15 mmol/L    Glucose 172 (H) 65 - 100 mg/dL    BUN 13 6 - 20 MG/DL    Creatinine 1.11 (H) 0.55 - 1.02 MG/DL    BUN/Creatinine ratio 12 12 - 20      GFR est AA 57 (L) >60 ml/min/1.73m2    GFR est non-AA 47 (L) >60 ml/min/1.73m2    Calcium 7.7 (L) 8.5 - 10.1 MG/DL   MAGNESIUM    Collection Time: 10/08/20  5:40 PM   Result Value Ref Range    Magnesium 1.8 1.6 - 2.4 mg/dL   PHOSPHORUS    Collection Time: 10/08/20  5:40 PM   Result Value Ref Range    Phosphorus 2.9 2.6 - 4.7 MG/DL   CBC W/O DIFF    Collection Time: 10/08/20  5:40 PM   Result Value Ref Range    WBC 21.4 (H) 3.6 - 11.0 K/uL    RBC 4.26 3.80 - 5.20 M/uL    HGB 10.7 (L) 11.5 - 16.0 g/dL    HCT 34.4 (L) 35.0 - 47.0 %    MCV 80.8 80.0 - 99.0 FL    MCH 25.1 (L) 26.0 - 34.0 PG    MCHC 31.1 30.0 - 36.5 g/dL    RDW 18.1 (H) 11.5 - 14.5 %    PLATELET 972 (L) 331 - 400 K/uL    NRBC 0.0 0  WBC ABSOLUTE NRBC 0.00 0.00 - 0.01 K/uL   LACTIC ACID    Collection Time: 10/08/20  5:40 PM   Result Value Ref Range    Lactic acid 3.7 (HH) 0.4 - 2.0 MMOL/L   TROPONIN I    Collection Time: 10/08/20  5:40 PM   Result Value Ref Range    Troponin-I, Qt. 0.27 (H) <0.05 ng/mL   NT-PRO BNP    Collection Time: 10/08/20  5:40 PM   Result Value Ref Range    NT pro-BNP 10,210 (H) <450 PG/ML   D DIMER    Collection Time: 10/08/20  5:40 PM   Result Value Ref Range    D-dimer 3.91 (H) 0.00 - 0.65 mg/L FEU   LD    Collection Time: 10/08/20  5:40 PM   Result Value Ref Range     81 - 246 U/L     Recent Labs     10/08/20  1740   WBC 21.4*   HGB 10.7*   HCT 34.4*   *     Recent Labs     10/08/20  1740   *   K 3.6      CO2 21   *   BUN 13   CREA 1.11*   CA 7.7*   MG 1.8   PHOS 2.9       Imaging  Xr Chest Port    Result Date: 10/8/2020  Indication: ET tube placement Comparison: 7/16/2019 Portable exam of the chest obtained at 1604 demonstrates normal heart size. ET tube has been placed and projects in satisfactory position. NG tube has been placed with the sidehole projecting at the gastroesophageal junction. This should be advanced. Lungs are clear. Port-A-Cath unchanged in position with the tip projecting at the SVC/right atrial junction. Impression: No acute abnormality. Advance NG tube so that the sidehole is not at the gastroesophageal junction. ET tube satisfactory position.         Assessment and Plan     Acute respiratory failure with hypoxia and hypercapnea  -cont vent support  -pulm following    Bilateral pneumonia  -cont broad spectrum abx  -covid test, RVP pending  -prn nebs  -follow cultures    Septic shock  -likely due to above  -gentle hydration in light of CHF  -on pressors  -trend lactate    Acute on chronic systolic CHF  -echo pending  -BNP elevated  -give a dose of lasix now  -consult cardiology    Elevated troponin  -cont to trend  -start heparin/lovenox if trending upwards    I personally spent 35 minutes of critical care time with the patient. Patient is at high risk of decompensation. Diet: NPO  Activity: bedrest  DVT prophylaxis: SCDs  Isolation precautions: droplet plus       Signed by:  Mesfin Maldonado MD    October 8, 2020 at 8:45 PM

## 2020-10-09 NOTE — NURSE NAVIGATOR
Chart reviewed by Heart Failure Nurse Navigator. Heart Failure database completed. Patient transferred from [facility] ED after presenting with onset of severe shortness of breath, intubated emergently prior to transfer here. Patient with history of HFrEF. Current Echo is pending. EF:  Current Echo is pending. ACEi/ARB/ARNi: **    BB: Metoprolol tartrate 25 mg every 12 hrs. Aldosterone Antagonist: **    Obstructive Sleep Apnea Screening:   STOP-BANG score:   Referred to Sleep Medicine:     CRT **. NYHA Functional Class **. Heart Failure Teach Back in Patient Education. Heart Failure Avoiding Triggers on Discharge Instructions. Cardiologist: Dr. Waterman Early discharge follow up phone call to be made within 48-72 hours of discharge.

## 2020-10-09 NOTE — PROGRESS NOTES
Problem: Non-Violent Restraints  Goal: *Removal from restraints as soon as assessed to be safe  Outcome: Not Progressing Towards Goal  Goal: *No harm/injury to patient while restraints in use  Outcome: Not Progressing Towards Goal  Goal: *Patient's dignity will be maintained  Outcome: Not Progressing Towards Goal  Goal: *Patient Specific Goal (EDIT GOAL, INSERT TEXT)  Outcome: Not Progressing Towards Goal  Goal: Non-violent Restaints:Standard Interventions  Outcome: Not Progressing Towards Goal  Goal: Non-violent Restraints:Patient Interventions  Outcome: Not Progressing Towards Goal  Goal: Patient/Family Education  Outcome: Not Progressing Towards Goal     Problem: Ventilator Management  Goal: *Adequate oxygenation and ventilation  Outcome: Not Progressing Towards Goal  Goal: *Patient maintains clear airway/free of aspiration  Outcome: Not Progressing Towards Goal  Goal: *Absence of infection signs and symptoms  Outcome: Not Progressing Towards Goal  Goal: *Normal spontaneous ventilation  Outcome: Not Progressing Towards Goal     Problem: Patient Education: Go to Patient Education Activity  Goal: Patient/Family Education  Outcome: Not Progressing Towards Goal     Problem: Risk for Spread of Infection  Goal: Prevent transmission of infectious organism to others  Description: Prevent the transmission of infectious organisms to other patients, staff members, and visitors. Outcome: Not Progressing Towards Goal     Problem: Patient Education:  Go to Education Activity  Goal: Patient/Family Education  Outcome: Not Progressing Towards Goal     Problem: Falls - Risk of  Goal: *Absence of Falls  Description: Document Richard Merlin Fall Risk and appropriate interventions in the flowsheet.   Outcome: Not Progressing Towards Goal  Note: Fall Risk Interventions:       Mentation Interventions: Adequate sleep, hydration, pain control, Bed/chair exit alarm, Evaluate medications/consider consulting pharmacy, More frequent rounding, Reorient patient, Room close to nurse's station, Update white board    Medication Interventions: Assess postural VS orthostatic hypotension, Bed/chair exit alarm, Evaluate medications/consider consulting pharmacy    Elimination Interventions: Bed/chair exit alarm, Call light in reach              Problem: Patient Education: Go to Patient Education Activity  Goal: Patient/Family Education  Outcome: Not Progressing Towards Goal     Problem: Pressure Injury - Risk of  Goal: *Prevention of pressure injury  Description: Document Irwin Scale and appropriate interventions in the flowsheet. Outcome: Not Progressing Towards Goal  Note: Pressure Injury Interventions:  Sensory Interventions: Assess changes in LOC, Assess need for specialty bed, Avoid rigorous massage over bony prominences, Float heels, Keep linens dry and wrinkle-free, Maintain/enhance activity level, Minimize linen layers, Monitor skin under medical devices, Pad between skin to skin, Pressure redistribution bed/mattress (bed type), Turn and reposition approx. every two hours (pillows and wedges if needed)         Activity Interventions: Assess need for specialty bed, Pressure redistribution bed/mattress(bed type)    Mobility Interventions: Assess need for specialty bed, Float heels, HOB 30 degrees or less, Pressure redistribution bed/mattress (bed type), PT/OT evaluation, Suspension boots, Turn and reposition approx.  every two hours(pillow and wedges)    Nutrition Interventions: Document food/fluid/supplement intake, Discuss nutritional consult with provider    Friction and Shear Interventions: Apply protective barrier, creams and emollients, HOB 30 degrees or less, Lift sheet, Lift team/patient mobility team, Minimize layers, Transferring/repositioning devices                Problem: Patient Education: Go to Patient Education Activity  Goal: Patient/Family Education  Outcome: Not Progressing Towards Goal

## 2020-10-09 NOTE — PROGRESS NOTES
0710: Report from [facility] SHELDON BACK.    7798: Rates verified. Vitals stable. 0750: Propofol tubing changed. Oral care completed. 0820: Client reposition and restless. Propofol increased. 0837: Provided an update to daughter Isaura Caballero and was on the phone reviewing results and status for the past 15 minutes. Also requested that Garcia Metzger, Mid-Level contact daughter at her convenience. 1125: Client has negative COVID results as of 0954. Dr. Albania Peña informed. Isolation precautions. Propofol continues to be weaned in preparation for SBT. 1200: Dr. Albania Peña has switched Ventilator settings to PSV at 5 over 8. Tidal volumes are at the mid 500's. Client tolerating weaning well. 1225: Tolerating weaning well. Eye opened and shaking head yes or no to questions. 1251: Neosynephrine has been stopped a this time. Blood pressure are controlled with a MAP above 65. No distress. 1310: Propofol has been turned off. Clientt responds to commands. No distress. Tidal volumes are in the low 700's. All other vitals stable. 1420: ECHO underway. No distress. Vitals stable. Will provide Metoprolol for elevated heart rate. Continues to tolerate ventilator settings of PSV at 5 over 5.    1500: Restraints removed. Client alert and oriented. Vitals stable. No distress. 1600: Client successfully extubated at this time and placed on two liters of oxygen by nasal cannula. Vitals stable. No distress. 1810: Called and informed Dr. Nazia Chavarria that current morfin was placed at the [facility] ED. Questioned the need for a morfin at this time. He requested morfin be pulled and not replaced. Will use Purewick. 1905: Report to Prabha Torres RN.

## 2020-10-10 ENCOUNTER — APPOINTMENT (OUTPATIENT)
Dept: GENERAL RADIOLOGY | Age: 85
DRG: 208 | End: 2020-10-10
Attending: INTERNAL MEDICINE
Payer: MEDICARE

## 2020-10-10 LAB
ALBUMIN SERPL-MCNC: 3.2 G/DL (ref 3.5–5)
ALBUMIN/GLOB SERPL: 0.8 {RATIO} (ref 1.1–2.2)
ALP SERPL-CCNC: 64 U/L (ref 45–117)
ALT SERPL-CCNC: 20 U/L (ref 12–78)
ANION GAP SERPL CALC-SCNC: 7 MMOL/L (ref 5–15)
AST SERPL-CCNC: 25 U/L (ref 15–37)
BASOPHILS # BLD: 0 K/UL (ref 0–0.1)
BASOPHILS NFR BLD: 0 % (ref 0–1)
BILIRUB SERPL-MCNC: 0.9 MG/DL (ref 0.2–1)
BUN SERPL-MCNC: 19 MG/DL (ref 6–20)
BUN/CREAT SERPL: 15 (ref 12–20)
CALCIUM SERPL-MCNC: 8.4 MG/DL (ref 8.5–10.1)
CHLORIDE SERPL-SCNC: 100 MMOL/L (ref 97–108)
CO2 SERPL-SCNC: 31 MMOL/L (ref 21–32)
CREAT SERPL-MCNC: 1.25 MG/DL (ref 0.55–1.02)
DIFFERENTIAL METHOD BLD: ABNORMAL
EOSINOPHIL # BLD: 0 K/UL (ref 0–0.4)
EOSINOPHIL NFR BLD: 0 % (ref 0–7)
ERYTHROCYTE [DISTWIDTH] IN BLOOD BY AUTOMATED COUNT: 18.9 % (ref 11.5–14.5)
GLOBULIN SER CALC-MCNC: 3.8 G/DL (ref 2–4)
GLUCOSE SERPL-MCNC: 105 MG/DL (ref 65–100)
HCT VFR BLD AUTO: 36.5 % (ref 35–47)
HGB BLD-MCNC: 11.7 G/DL (ref 11.5–16)
IMM GRANULOCYTES # BLD AUTO: 0.1 K/UL (ref 0–0.04)
IMM GRANULOCYTES NFR BLD AUTO: 1 % (ref 0–0.5)
LYMPHOCYTES # BLD: 2.4 K/UL (ref 0.8–3.5)
LYMPHOCYTES NFR BLD: 13 % (ref 12–49)
MCH RBC QN AUTO: 25.5 PG (ref 26–34)
MCHC RBC AUTO-ENTMCNC: 32.1 G/DL (ref 30–36.5)
MCV RBC AUTO: 79.5 FL (ref 80–99)
MONOCYTES # BLD: 1.2 K/UL (ref 0–1)
MONOCYTES NFR BLD: 7 % (ref 5–13)
NEUTS SEG # BLD: 14.8 K/UL (ref 1.8–8)
NEUTS SEG NFR BLD: 80 % (ref 32–75)
NRBC # BLD: 0 K/UL (ref 0–0.01)
NRBC BLD-RTO: 0 PER 100 WBC
PLATELET # BLD AUTO: 156 K/UL (ref 150–400)
POTASSIUM SERPL-SCNC: 3.2 MMOL/L (ref 3.5–5.1)
PROT SERPL-MCNC: 7 G/DL (ref 6.4–8.2)
RBC # BLD AUTO: 4.59 M/UL (ref 3.8–5.2)
SODIUM SERPL-SCNC: 138 MMOL/L (ref 136–145)
TSH SERPL DL<=0.05 MIU/L-ACNC: 6.15 UIU/ML (ref 0.36–3.74)
WBC # BLD AUTO: 18.7 K/UL (ref 3.6–11)

## 2020-10-10 PROCEDURE — 65610000006 HC RM INTENSIVE CARE

## 2020-10-10 PROCEDURE — 80053 COMPREHEN METABOLIC PANEL: CPT

## 2020-10-10 PROCEDURE — C9113 INJ PANTOPRAZOLE SODIUM, VIA: HCPCS | Performed by: INTERNAL MEDICINE

## 2020-10-10 PROCEDURE — 74011250636 HC RX REV CODE- 250/636: Performed by: INTERNAL MEDICINE

## 2020-10-10 PROCEDURE — 36415 COLL VENOUS BLD VENIPUNCTURE: CPT

## 2020-10-10 PROCEDURE — 74011000250 HC RX REV CODE- 250: Performed by: INTERNAL MEDICINE

## 2020-10-10 PROCEDURE — 94640 AIRWAY INHALATION TREATMENT: CPT

## 2020-10-10 PROCEDURE — 84443 ASSAY THYROID STIM HORMONE: CPT

## 2020-10-10 PROCEDURE — 85025 COMPLETE CBC W/AUTO DIFF WBC: CPT

## 2020-10-10 PROCEDURE — 99233 SBSQ HOSP IP/OBS HIGH 50: CPT | Performed by: INTERNAL MEDICINE

## 2020-10-10 PROCEDURE — 74011250637 HC RX REV CODE- 250/637: Performed by: INTERNAL MEDICINE

## 2020-10-10 PROCEDURE — 65660000000 HC RM CCU STEPDOWN

## 2020-10-10 PROCEDURE — 71045 X-RAY EXAM CHEST 1 VIEW: CPT

## 2020-10-10 RX ORDER — IPRATROPIUM BROMIDE AND ALBUTEROL SULFATE 2.5; .5 MG/3ML; MG/3ML
3 SOLUTION RESPIRATORY (INHALATION)
Status: DISCONTINUED | OUTPATIENT
Start: 2020-10-10 | End: 2020-10-11

## 2020-10-10 RX ORDER — FUROSEMIDE 40 MG/1
40 TABLET ORAL DAILY
Status: DISCONTINUED | OUTPATIENT
Start: 2020-10-11 | End: 2020-10-13 | Stop reason: HOSPADM

## 2020-10-10 RX ORDER — PANTOPRAZOLE SODIUM 40 MG/1
40 TABLET, DELAYED RELEASE ORAL DAILY
Status: DISCONTINUED | OUTPATIENT
Start: 2020-10-11 | End: 2020-10-13 | Stop reason: HOSPADM

## 2020-10-10 RX ORDER — FUROSEMIDE 10 MG/ML
40 INJECTION INTRAMUSCULAR; INTRAVENOUS ONCE
Status: DISCONTINUED | OUTPATIENT
Start: 2020-10-10 | End: 2020-10-10

## 2020-10-10 RX ADMIN — HEPARIN SODIUM 5000 UNITS: 5000 INJECTION INTRAVENOUS; SUBCUTANEOUS at 21:01

## 2020-10-10 RX ADMIN — ARFORMOTEROL TARTRATE 15 MCG: 15 SOLUTION RESPIRATORY (INHALATION) at 09:48

## 2020-10-10 RX ADMIN — CLOPIDOGREL BISULFATE 75 MG: 75 TABLET ORAL at 08:59

## 2020-10-10 RX ADMIN — PANTOPRAZOLE SODIUM 40 MG: 40 INJECTION, POWDER, FOR SOLUTION INTRAVENOUS at 08:59

## 2020-10-10 RX ADMIN — VANCOMYCIN HYDROCHLORIDE 1000 MG: 1 INJECTION, POWDER, LYOPHILIZED, FOR SOLUTION INTRAVENOUS at 00:01

## 2020-10-10 RX ADMIN — ARFORMOTEROL TARTRATE 15 MCG: 15 SOLUTION RESPIRATORY (INHALATION) at 20:12

## 2020-10-10 RX ADMIN — CEFEPIME HYDROCHLORIDE 2 G: 2 INJECTION, POWDER, FOR SOLUTION INTRAVENOUS at 05:36

## 2020-10-10 RX ADMIN — BUDESONIDE 500 MCG: 0.5 INHALANT RESPIRATORY (INHALATION) at 20:12

## 2020-10-10 RX ADMIN — ASPIRIN 81 MG: 81 TABLET, CHEWABLE ORAL at 08:59

## 2020-10-10 RX ADMIN — PHENYLEPHRINE HYDROCHLORIDE 25 MCG/MIN: 10 INJECTION INTRAVENOUS at 13:57

## 2020-10-10 RX ADMIN — IPRATROPIUM BROMIDE AND ALBUTEROL SULFATE 3 ML: .5; 3 SOLUTION RESPIRATORY (INHALATION) at 09:43

## 2020-10-10 RX ADMIN — LEVOFLOXACIN 750 MG: 5 INJECTION, SOLUTION INTRAVENOUS at 20:56

## 2020-10-10 RX ADMIN — IPRATROPIUM BROMIDE AND ALBUTEROL SULFATE 3 ML: .5; 3 SOLUTION RESPIRATORY (INHALATION) at 20:09

## 2020-10-10 RX ADMIN — HEPARIN SODIUM 5000 UNITS: 5000 INJECTION INTRAVENOUS; SUBCUTANEOUS at 09:01

## 2020-10-10 RX ADMIN — IPRATROPIUM BROMIDE AND ALBUTEROL SULFATE 3 ML: .5; 3 SOLUTION RESPIRATORY (INHALATION) at 03:22

## 2020-10-10 RX ADMIN — BUDESONIDE 500 MCG: 0.5 INHALANT RESPIRATORY (INHALATION) at 09:48

## 2020-10-10 RX ADMIN — IPRATROPIUM BROMIDE AND ALBUTEROL SULFATE 3 ML: .5; 3 SOLUTION RESPIRATORY (INHALATION) at 14:00

## 2020-10-10 RX ADMIN — POTASSIUM BICARBONATE 40 MEQ: 782 TABLET, EFFERVESCENT ORAL at 08:58

## 2020-10-10 NOTE — PROGRESS NOTES
Cardiology Daily Progress Note      Christo Marshall is a 81 y.o. female with PMH of CAD/NSTEMI 7/15/19, HTN, HLD, lupus, CKD, CLARISSE, COPD,  anemia admitted with hypoxic respiratory failure.      Visit Vitals  BP (!) 118/41   Pulse 91   Temp 99.7 °F (37.6 °C)   Resp 17   Ht 5' 2\" (1.575 m)   Wt 120 lb 13 oz (54.8 kg)   SpO2 98%   BMI 22.10 kg/m²       Intake/Output Summary (Last 24 hours) at 10/9/2020 2225  Last data filed at 10/9/2020 1745  Gross per 24 hour   Intake 1122.68 ml   Output 4625 ml   Net -3502.32 ml     General appearance - alert, well appearing, and in no distress  Mental status - affect appropriate to mood  Eyes - sclera anicteric, moist mucous membranes  Neck - supple, no significant adenopathy  Chest - clear to auscultation, no wheezes, rales or rhonchi  Heart - normal rate, regular rhythm, normal S1, S2, no murmurs, rubs, clicks or gallops  Abdomen - soft, nontender, nondistended, no masses or organomegaly  Extremities - peripheral pulses normal, no pedal edema    Current Facility-Administered Medications   Medication Dose Route Frequency    furosemide (LASIX) injection 40 mg  40 mg IntraVENous BID    heparin (porcine) injection 5,000 Units  5,000 Units SubCUTAneous Q12H    aspirin chewable tablet 81 mg  81 mg Oral DAILY    clopidogreL (PLAVIX) tablet 75 mg  75 mg Oral DAILY    metoprolol tartrate (LOPRESSOR) tablet 25 mg  25 mg Oral Q12H    propofol (DIPRIVAN) 10 mg/mL infusion  0-50 mcg/kg/min IntraVENous TITRATE    fentaNYL (PF) 1,500 mcg/30 mL (50 mcg/mL) infusion  0-200 mcg/hr IntraVENous TITRATE    arformoteroL (BROVANA) neb solution 15 mcg  15 mcg Nebulization BID RT    budesonide (PULMICORT) 500 mcg/2 ml nebulizer suspension  500 mcg Nebulization BID RT    albuterol-ipratropium (DUO-NEB) 2.5 MG-0.5 MG/3 ML  3 mL Nebulization Q4H RT    pantoprazole (PROTONIX) 40 mg in 0.9% sodium chloride 10 mL injection  40 mg IntraVENous DAILY    Vancomycin dosing per levels    Other Rx Dosing/Monitoring    levoFLOXacin (LEVAQUIN) 750 mg in D5W IVPB  750 mg IntraVENous Q48H    cefepime (MAXIPIME) 2 g in sterile water (preservative free) 10 mL IV syringe  2 g IntraVENous Q12H    PHENYLephrine (ALBERT-SYNEPHRINE) 30 mg in 0.9% sodium chloride 250 mL infusion   mcg/min IntraVENous TITRATE        PAD form completed 8/29/19    Lipids 7/16/19 - TC 91, TG 95, HDL 31, LDL 41, VLDL 91      Cardiac Testing/ Procedures:     A. Cardiac Cath/PCI:R Radial access  RCA - JR4  LCA - difficulty to access with TIG4;EBU 3.5     R CFA - micropuncture/ultrasound/fluorscopy guided access     L Main: Ca++; Ostial LM 10% ( good reflux/no dampening)     LAD: Med; MLI; D1 - MLI     LCflex: Med; MLI     RCA: Dominant  Prox 95%; Mid 50%; PDA and PLB - small; PLB branch diffuse severe dz        LVEDP:  15     LVEF: Not assessed; Nml by echo     No significant gradient across aortic valve.     PCI: JR4 guide ( with SH)  BMW wire  Pre dil with 2 by 12  VINOD 2.5 by 26  Post dil with 2.75 by 12 ( at high merary - approx 3mm)  GERALD 3 before and after           Specimens Removed : None     Closure Device: TR Band - R radial     R CFA - mynx           B. ECHO/AUNDREA: 7//15/19 - EF 56-60%; Mild AR, TR, OR, MR. Mod Pulm HTN     C. StressNuclear/Stress ECHO/Stress test:     D. Vascular:     E. EP:     F. Miscellaneous:    Assessment:    - Acute on Chronic respiratory failure  - Acute on chronic systolic CHF  - Recent NSTEMI  - CAD/ MI, PCI RCA 2019  - CKD  - Lupus  - COPD  - Lymphoma      Plan:     - Plan for LHC on Monday. - Wean Albert. Holding Lopressor and Lasix until weaned. - IVF challenge upto 1L if HR goes above 120 after Albert is weaned.            ___________________________________________________    Gely Rojas MD, [facility] [place]

## 2020-10-10 NOTE — PROGRESS NOTES
1900: Bedside and Verbal shift change report given to Darius Casanova (oncoming nurse) by Taisha Magdaleno (offgoing nurse). Report included the following information SBAR, Kardex, ED Summary, Intake/Output, MAR, Recent Results and Cardiac Rhythm NSR. Primary Nurse Roula Alegria and Taisha Magdaleno, SHELDON performed a dual skin assessment on this patient Impairment noted- see wound doc flow sheet  Patient this shift very pleasant and cooperative. Alert and oriented, following directions. RN sent down vanc trough and lactic acid- now normalized. Started patient back on Albert for low MAP' at 8 pm. Patient bathed and linens changed. No acute issues overnight. On nasal cannula sating well. Using purwick with great output from IV lasix. Titrated  Albert for systolic greater than 630. Sent morning labs off. Patient without complaints. 0700: Bedside and Verbal shift change report given to Garcia Rodriguez (oncoming nurse) by Vera Gonsalves (offgoing nurse). Report included the following information SBAR, Kardex, ED Summary, Intake/Output, MAR, Recent Results and Cardiac Rhythm NSR.

## 2020-10-10 NOTE — PROGRESS NOTES
[facility] G[facility] [address]  [address] [facility], [address] [place] [address]  [phone]      Medical Progress Note      NAME: Mihai Mercado   :  1935  MRM:  [id number]    Date of service: 10/10/2020  1:54 PM       Assessment and Plan:   1. Acute respiratory failure with hypoxia and hypercapnea. Extubated on 10/09. continue supplement O2 to keep SAO2> 90%. 2.  Abnormal CXR. ? Pneumonia. Repeated CXR showed resolution of infiltrate. Will de escalate ABx. SARS-CoV-2 is negative. Check cultures      3. Acute on chronic diastolic CHF. Holding diuretic IV due to hypotension. Planned for cardiac cath on Monday. Cardiac MRI if cath unrevealing. Echo EF is normal, but grade one diastolic dysfunction. 4.  Elevated troponin/ recent NSTEMI/ CAD s/p PCI in 2019. On ASA and plavix. Palnned for cardiac cath on Monday      5. Elevated lactic acid. Resolved. Continue ABx       6. SLE: will resume hydroxychloroquine       7. Hypothyroidism:  Continue synthroid. Check TSH     8. HTN: holding home BP meds due to hypotension. Is on metoprolol, lisinopril at home. On lila for hypotension.              Subjective:     Chief Complaint[de-identified] Patient was seen and examined as a follow up for acute respiratory failure. Chart was reviewed. c/o mild cough      ROS:  (bold if positive, if negative)    Cough Tolerating PT  Tolerating Diet        Objective:     Last 24hrs VS reviewed since prior progress note.  Most recent are:    Visit Vitals  BP (!) 103/35   Pulse 83   Temp 98.1 °F (36.7 °C)   Resp 29   Ht 5' 2\" (1.575 m)   Wt 54.8 kg (120 lb 13 oz)   SpO2 93%   BMI 22.10 kg/m²     SpO2 Readings from Last 6 Encounters:   10/10/20 93%   20 98%   19 98%   19 97%   19 98%   19 97%    O2 Flow Rate (L/min): 2 l/min       Intake/Output Summary (Last 24 hours) at 10/10/2020 1025  Last data filed at 10/10/2020 0600  Gross per 24 hour   Intake 644.49 ml   Output 4050 ml   Net -3405.51 ml Physical Exam:    Gen:  Well-developed, well-nourished, in no acute distress  HEENT:  Pink conjunctivae, PERRL, hearing intact to voice, moist mucous membranes  Neck:  Supple, without masses, thyroid non-tender  Resp:  No accessory muscle use, clear breath sounds without wheezes rales or rhonchi  Card:  Grade 3/6 systolic murmur, normal S1, S2 without thrills, bruits or peripheral edema  Abd:  Soft, non-tender, non-distended, normoactive bowel sounds are present, no palpable organomegaly and no detectable hernias  Lymph:  No cervical or inguinal adenopathy  Musc:  No cyanosis or clubbing  Skin:  No rashes or ulcers, skin turgor is good  Neuro:  Cranial nerves are grossly intact, no focal motor weakness, follows commands appropriatelyintubated and sedatedtubated and sedated  __________________________________________________________________  Medications Reviewed: (see below)  Medications:     Current Facility-Administered Medications   Medication Dose Route Frequency    VANCOMYCIN INFORMATION NOTE   Other ONCE    [START ON 10/11/2020] cefepime (MAXIPIME) 2 g in sterile water (preservative free) 10 mL IV syringe  2 g IntraVENous Q24H    furosemide (LASIX) injection 40 mg  40 mg IntraVENous BID    heparin (porcine) injection 5,000 Units  5,000 Units SubCUTAneous Q12H    aspirin chewable tablet 81 mg  81 mg Oral DAILY    clopidogreL (PLAVIX) tablet 75 mg  75 mg Oral DAILY    metoprolol tartrate (LOPRESSOR) tablet 25 mg  25 mg Oral Q12H    Please drawn vancomycin random level on 10/10/20 @1500hrs  1 Each Other ONCE    propofol (DIPRIVAN) 10 mg/mL infusion  0-50 mcg/kg/min IntraVENous TITRATE    fentaNYL (PF) 1,500 mcg/30 mL (50 mcg/mL) infusion  0-200 mcg/hr IntraVENous TITRATE    arformoteroL (BROVANA) neb solution 15 mcg  15 mcg Nebulization BID RT    budesonide (PULMICORT) 500 mcg/2 ml nebulizer suspension  500 mcg Nebulization BID RT    albuterol-ipratropium (DUO-NEB) 2.5 MG-0.5 MG/3 ML  3 mL Nebulization Q4H RT    pantoprazole (PROTONIX) 40 mg in 0.9% sodium chloride 10 mL injection  40 mg IntraVENous DAILY    Vancomycin dosing per levels    Other Rx Dosing/Monitoring    levoFLOXacin (LEVAQUIN) 750 mg in D5W IVPB  750 mg IntraVENous Q48H    PHENYLephrine (RUFINA-SYNEPHRINE) 30 mg in 0.9% sodium chloride 250 mL infusion   mcg/min IntraVENous TITRATE        Lab Data Reviewed: (see below)  Lab Review:     Recent Labs     10/10/20  0343 10/09/20  0512 10/08/20  1740   WBC 18.7* 20.5* 21.4*   HGB 11.7 11.4* 10.7*   HCT 36.5 35.8 34.4*    143* 130*     Recent Labs     10/10/20  0343 10/09/20  0512 10/08/20  1740    133* 133*   K 3.2* 3.7 3.6    100 100   CO2 31 22 21   * 134* 172*   BUN 19 17 13   CREA 1.25* 1.18* 1.11*   CA 8.4* 8.1* 7.7*   MG  --  1.8 1.8   PHOS  --  3.0 2.9   ALB 3.2*  --   --    TBILI 0.9  --   --    ALT 20  --   --      No results found for: GLUCPOC  Recent Labs     10/09/20  0650 10/08/20  1640   PH 7.44 7.37   PCO2 31* 36   PO2 155* 129*   HCO3 20* 20*   FIO2 40 40     No results for input(s): INR, INREXT, INREXT in the last 72 hours. All Micro Results     Procedure Component Value Units Date/Time    CULTURE, BLOOD, PAIRED [[id number]] Collected:  10/08/20 1652    Order Status:  Completed Specimen:  Blood Updated:  10/10/20 0422     Special Requests: NO SPECIAL REQUESTS        Culture result: NO GROWTH 2 DAYS       CULTURE, MRSA [[id number]] Collected:  10/08/20 1611    Order Status:  Completed Specimen:  Nasal from Nares Updated:  10/09/20 2115     Special Requests: NO SPECIAL REQUESTS        Culture result: MRSA NOT PRESENT                   Screening of patient nares for MRSA is for surveillance purposes and, if positive, to facilitate isolation considerations in high risk settings. It is not intended for automatic decolonization interventions per se as regimens are not sufficiently effective to warrant routine use.           CULTURE, RESPIRATORY/SPUTUM/BRONCH Alvena Distel STAIN [[id number]]  (Abnormal) Collected:  10/08/20 5589    Order Status:  Completed Specimen:  Sputum,ET Suction Updated:  10/09/20 1344     Special Requests: NO SPECIAL REQUESTS        GRAM STAIN OCCASIONAL WBCS SEEN               OCCASIONAL EPITHELIAL CELLS SEEN            1+ GRAM NEGATIVE RODS               OCCASIONAL GRAM POSITIVE COCCI IN PAIRS                  OCCASIONAL GRAM NEGATIVE DIPLOCOCCI           Culture result: LIGHT GRAM NEGATIVE RODS               LIGHT NORMAL RESPIRATORY JAMES SO FAR          RESPIRATORY PANEL,PCR,NASOPHARYNGEAL [[id number]]     Order Status:  Sent Specimen:  NASOPHARYNGEAL SWAB           I have reviewed notes of prior 24hr. Other pertinent lab:      Total time spent with patient: Joyu 59 discussed with: Patient, Nursing Staff and >50% of time spent in counseling and coordination of care    Discussed:  Care Plan    Prophylaxis:  Hep SQ    Disposition:  Home w/Family           ___________________________________________________    Attending Physician: Yaniv Yung MD

## 2020-10-10 NOTE — PROGRESS NOTES
[facility] Dosing Services: Antimicrobial Stewardship Daily Doc    Consult for antibiotic dosing of vancomycin by Dr. Tiffanie Byers  Indication: Pneumonia  Day of Therapy: 3    Ht Readings from Last 1 Encounters:   10/09/20 157.5 cm (62\")        Wt Readings from Last 1 Encounters:   10/09/20 54.8 kg (120 lb 13 oz)     Vancomycin therapy:  Current maintenance dose: Pharmacy to Dose by Levels  Dose calculated to approximate a therapeutic trough of 15-20 mcg/mL. Assessment/Plan:  SCr increased to 1.25 mg/dL, leukocytosis persists WBC 18.7, no procalcitonin to evaluate, afebrile throughout admission, light GNR and occasional GPC in paires in sputum - ET suction, urine output is very good  Continue vancomycin dosing by levels as already ordered, will wait for random level that is ordered for 10/10 at 1500 - may be able to transition to q16h regimen based on this level  SCr ordered daily with AM labs per protocol  Dose administration notes:   Doses given appropriately as scheduled    Date Dose & Interval Measured (mcg/mL) Extrapolated (mcg/mL)   ?10/9 at 2203 ?~25 hours after 1000 mg dose ?7.2 ? N/A   ? ? ? ?   ? ? ? ? Non-Kinetic Antimicrobial Dosing Regimen:   Current Regimen:  Cefepime 2 g IV every 12 hours  Recommendation: Change to cefepime 2 g IV every 24 hours for CrCl less than 30 mL/min per protocol. Dose administration notes:   Doses given appropriately as scheduled    Other Antimicrobial   (not dosed by pharmacist) Levofloxacin 750 mg IV every 48 hours   Cultures 10/8 - MRSA nares - Not Present - FINAL  10/8 - Blood, paired - NGTD x 2 days - Prelim  10/8 - Sputum - ET suction - occasional GPC in pairs, light GNR - Prelim   Serum Creatinine Lab Results   Component Value Date/Time    Creatinine 1.25 (H) 10/10/2020 03:43 AM         Creatinine Clearance Estimated Creatinine Clearance: 26.5 mL/min (A) (based on SCr of 1.25 mg/dL (H)).      Temp Temp: 98.1 °F (36.7 °C)       WBC Lab Results   Component Value Date/Time    WBC 18.7 (H) 10/10/2020 03:43 AM        H/H Lab Results   Component Value Date/Time    HGB 11.7 10/10/2020 03:43 AM        Platelets    Lab Results   Component Value Date/Time    PLATELET 874 81/94/3833 03:43 AM            For Antifungals, Metronidazole, and Nafcillin:  ALT:        AST:      Alk Phos:      T Bili:    Pharmacist Carlos Fuller PHARMD Contact information:

## 2020-10-10 NOTE — PROGRESS NOTES
3256: Report from Austin Rea, [address] [facility]. Vitals stable. Blood pressure is in the 17'G systolic. Neosynephrine has been restarted and will be titrated to maintain systolic above 093 per goal set yesterday. 0730: Sleeping at this time time, but arousbale. All other vitals stable. No distress. 0845: Discussed client's current condition with Dr. Lizzette García. Holding metoprolol and lasix at this time due to low blood pressures this morning, abn the need to restart to Neosynephrine. 0920: Client adjusted up in bed. Helped by Maggie Villegas. Client appears to be modest around male Nurse so Gonzalo DUTTA Checked and verified [address] placement. Client is clean and dry. Client passed dysphagia screening and is taking oral medication without difficulty. 1000: Provided an update to Daughter, Ava Aden, and she will be to hospital by 1200 today. 1010: Spoke with Dr. Cori Padilla, and requested diet order for client. Reviewed case and determined Cardiac Diet would be appropriate. 1100: Spoke with Dr. Krish Rojas. Plan is still to perform Cardiac Cath Monday. Will monitor and continue to hold metoprolol and diuretics while client remains on Neosynephrine. 1130: Diet tray provided. Client adjusted in bed and assisted with her tray. 1230: Client sleeping at this time. No distress. Vitals stable. 1430: Client resting comfortably. No distress. Vitals stable. Alert and oriented times four. 1500: Daughter at bedside. Client's bed placed in chair position for comfort. Provided incentive spirometry with instruction. 1600: Client able to achieve volume of 750 with incentive spirometry. 1700: Eating dinner in bed. No distress. Vitals stable. 1800: Bed configured back to normal supine position. Head elevated. No distress. 1900: Report to [facility], RN.

## 2020-10-10 NOTE — PROGRESS NOTES
[facility]     Name: Rickie Modi MRN: [id number]   : 1935 Hospital: [address]   Date: 10/10/2020        Impression Plan   1. Acute respiratory failure with hypoxia and hypercapnia- extubated on 10/9 and now on ra   2. Bilateral airspace disease initially concerning for PNA- resolved  3. CAD  4. Systolic heart failure- nl lvef on repeat echo   5. RA/SLE  6. History of follicular lymphoma  7. Mild hypotension - currently on lila 25 ucg                O2 prn - currently on ra with good sats   · Brovana/Pulmicort, duonebs in place of home inhalers  · Low threshold for steroids- no wheezing currently  · Broad spectrum abx- vanc/cefepime/levofloxacin- so far bc ngsf and  Sputum nl slim - cxr has cleared   · Blood cx NGTD  · UA negative  · sputum cultures NGTD  · RVP still pending- no results listed - ? Sent   · COVD 19 neg   · Serial Lactic acid,   · Troponin negative  · D-dimer 2.13  · proBNP- 10 k   · TTE- seen   · Protonix in place of home PPI  · Diet advance now that she is extubated - discussed with rn who said she passed the bedside assessment . · I think we can dc the vanco and cefepime and continue levaquin for now . Pt is critically ill and at risk of decompensation in the setting of acute respiratory failure. Critical care time spent with pt exclusive of procedures was 35 minutes    Radiology  ( personally reviewed) CXR pending   ABG No results for input(s): PHI, PO2I, PCO2I in the last 72 hours. cxr seen - slight hazy left base post extubation but otherwise  Clear     Subjective     Patient is a 81 y.o. female with a history of asthma/COPD, CAD, ICM, systolic heart failure with an EF of 35-40%, RA/SLE, and follicular lymphoma admitted for acute respiratory failure with hypoxia. She was recently admitted to the CCU at [facility] for an NSTEMI and newly diagnosed heart failure.  During this admission she was intubated for acute hypoxic respiratory failure that sounds like it was thought to be due to volume overload. COVID was negative on 9/11. She was transferred to [facility] today for acute respiratory requiring intubation. Per report she was in respiratory distress on CPAP/BiPAP when she arrive and was intubated. CXR was concerning for bilateral airspace disease. Outside records reviewed:  TTE 9/11: EF 35-40%  LHC negative for culprit lesions, 30% ISR of RCA  CTA 9/11/2020 negative for PE; severe CAD; mild edema with small effusions  CXR 9/13/2020: L basilar atelectasis  COVID 9/11 negative    ABG 10/8 : 7.2/68/535  CXR 10/8: bilateral infiltrates     Progress since extubation discussed with rn . Still on lila for for hypotension. Past Medical History:   Diagnosis Date    Asthma     [facility] [address]     Hypertension     Hypothyroid     Lupus [facility] [address]     Non Hodgkin's lymphoma ([facility] [address]       No past surgical history on file. Prior to Admission medications    Medication Sig Start Date End Date Taking? Authorizing Provider   Spiriva Respimat 1.25 mcg/actuation inhaler Take 2 Puffs by inhalation daily. 6/9/20  Yes Provider, Historical   clopidogrel (PLAVIX) 75 mg tab Take 1 Tab by mouth daily. 7/17/19  Yes Janice Hanks NP   aspirin 81 mg chewable tablet Take 81 mg by mouth nightly. Yes Other, MD Poppy   levothyroxine (SYNTHROID) 125 mcg tablet Take 125 mcg by mouth Daily (before breakfast). Yes Provider, Historical   metoprolol succinate (TOPROL-XL) 50 mg XL tablet Take 75 mg by mouth daily. Indications: paroxysmal supraventricular tachycardia   Yes Provider, Historical   albuterol (PROVENTIL HFA, VENTOLIN HFA, PROAIR HFA) 90 mcg/actuation inhaler Take 2 Puffs by inhalation every six (6) hours as needed for Wheezing. Yes Other, MD Poppy   fluticasone propion-salmeteroL (Advair Diskus) 100-50 mcg/dose diskus inhaler Take 1 Puff by inhalation every twelve (12) hours.     Provider, Historical   ondansetron (ZOFRAN ODT) 8 mg disintegrating tablet Take 8 mg by mouth every eight (8) hours as needed for Nausea. Provider, Historical   aclidinium bromide (TUDORZA PRESSAIR) 400 mcg/actuation inhaler Take 1 Puff by inhalation. Provider, Historical   albuterol sulfate (PROVENTIL;VENTOLIN) 2.5 mg/0.5 mL nebu nebulizer solution by Nebulization route as needed for Wheezing. Provider, Historical   MELOXICAM PO Take 15 mg by mouth daily. Provider, Historical   acetaminophen (TYLENOL) 325 mg tablet Take  by mouth every four (4) hours as needed for Pain. Provider, Historical   bumetanide (BUMEX) 0.5 mg tablet Take 1 Tab by mouth daily. 7/16/19   Darcy Burnham NP   hydroxychloroquine (PLAQUENIL) 200 mg tablet Take 200 mg by mouth daily. Provider, Historical   lisinopril (PRINIVIL, ZESTRIL) 40 mg tablet Take 40 mg by mouth daily. Provider, Historical   montelukast (SINGULAIR) 10 mg tablet Take 10 mg by mouth daily. Provider, Historical   omeprazole (PRILOSEC) 20 mg capsule Take 20 mg by mouth daily. Provider, Historical   budesonide-formoterol (SYMBICORT) 160-4.5 mcg/actuation HFAA Take 2 Puffs by inhalation two (2) times a day. Provider, Historical   folic acid (FOLVITE) 1 mg tablet Take 1 mg by mouth daily. Poppy Shaffer MD   multivitamin (ONE A DAY) tablet Take 1 Tab by mouth daily. Poppy Shaffer MD   atorvastatin (LIPITOR) 20 mg tablet Take 20 mg by mouth daily. Poppy Shaffer MD   alendronate (FOSAMAX) 70 mg tablet Take 70 mg by mouth every seven (7) days. Poppy Shaffer MD   Calcium-Cholecalciferol, D3, (CALCIUM 600 WITH VITAMIN D3) 600 mg(1,500mg) -400 unit cap Take 1 Tab by mouth daily. Indications: osteoporosis, a condition of weak bones    Poppy Shaffer MD     Current Facility-Administered Medications   Medication Dose Route Frequency    Vancomycin - Please draw random level on 10/10 at 1500. Thanks!    Other ONCE    [START ON 10/11/2020] cefepime (MAXIPIME) 2 g in sterile water (preservative free) 10 mL IV syringe  2 g IntraVENous Q24H    furosemide (LASIX) injection 40 mg  40 mg IntraVENous BID    heparin (porcine) injection 5,000 Units  5,000 Units SubCUTAneous Q12H    aspirin chewable tablet 81 mg  81 mg Oral DAILY    clopidogreL (PLAVIX) tablet 75 mg  75 mg Oral DAILY    metoprolol tartrate (LOPRESSOR) tablet 25 mg  25 mg Oral Q12H    Please drawn vancomycin random level on 10/10/20 @1500hrs  1 Each Other ONCE    propofol (DIPRIVAN) 10 mg/mL infusion  0-50 mcg/kg/min IntraVENous TITRATE    fentaNYL (PF) 1,500 mcg/30 mL (50 mcg/mL) infusion  0-200 mcg/hr IntraVENous TITRATE    arformoteroL (BROVANA) neb solution 15 mcg  15 mcg Nebulization BID RT    budesonide (PULMICORT) 500 mcg/2 ml nebulizer suspension  500 mcg Nebulization BID RT    albuterol-ipratropium (DUO-NEB) 2.5 MG-0.5 MG/3 ML  3 mL Nebulization Q4H RT    pantoprazole (PROTONIX) 40 mg in 0.9% sodium chloride 10 mL injection  40 mg IntraVENous DAILY    Vancomycin dosing per levels    Other Rx Dosing/Monitoring    levoFLOXacin (LEVAQUIN) 750 mg in D5W IVPB  750 mg IntraVENous Q48H    PHENYLephrine (RUFINA-SYNEPHRINE) 30 mg in 0.9% sodium chloride 250 mL infusion   mcg/min IntraVENous TITRATE     Allergies   Allergen Reactions    Latex Rash      Social History     Tobacco Use    Smoking status: Never Smoker    Smokeless tobacco: Never Used   Substance Use Topics    Alcohol use: Never     Frequency: Never      Family History   Problem Relation Age of Onset    Heart Disease Mother           Laboratory: I have personally reviewed the critical care flowsheet and labs.      Recent Labs     10/10/20  0343 10/09/20  0512 10/08/20  1740   WBC 18.7* 20.5* 21.4*   HGB 11.7 11.4* 10.7*   HCT 36.5 35.8 34.4*    143* 130*     Recent Labs     10/10/20  0343 10/09/20  0512 10/08/20  1740    133* 133*   K 3.2* 3.7 3.6    100 100   CO2 31 22 21   * 134* 172*   BUN 19 17 13   CREA 1.25* 1.18* 1.11*   CA 8.4* 8.1* 7.7*   MG  -- 1. 8 1.8   PHOS  --  3.0 2.9   ALB 3.2*  --   --    ALT 20  --   --        Objective:     Mode Rate Tidal Volume Pressure FiO2 PEEP   Spontaneous, CPAP, Pressure support   400 ml  5 cm H2O 40 % 5 cm H20     Vital Signs:    Ventilator Pressures  Pressure Support (cm H2O): 5 cm H2O  PIP Observed (cm H2O): 11 cm H2O  Plateau Pressure (cm H2O): 18 cm H2O  MAP (cm H2O): 7  PEEP/VENT (cm H2O): 5 cm H20  Auto PEEP Observed (cm H2O): 7.5 cm [id number](24)Ventilator Pressures  Pressure Support (cm H2O): 5 cm H2O  PIP Observed (cm H2O): 11 cm H2O  Plateau Pressure (cm H2O): 18 cm H2O  MAP (cm H2O): 7  PEEP/VENT (cm H2O): 5 cm H20  Auto PEEP Observed (cm H2O): 7.5 cm H2O  Safety & Alarms  Circuit Temperature: (HME)  Backup Mode Checked/Apnea: Yes  Pressure Max: 40 cm H2O  Ve Min: 2  Ve Max: 22  RR Min: 5  RR Max: 40  Intake/Output:   Last shift:      Ventilator Volumes  Vt Set (ml): 400 ml  Vt Exhaled (Machine Breath) (ml): 388 ml  Vt Spont (ml): 675 ml  Ve Observed (l/min): 8.6 l/min  Last 3 shifts: No intake/output data recorded. [email or web address]    Intake/Output Summary (Last 24 hours) at 10/10/2020 1116  Last data filed at 10/10/2020 0600  Gross per 24 hour   Intake 569.36 ml   Output 4050 ml   Net -3480.64 ml     EXAM:   GENERAL: awake on ra, thin. . no distress  , HEENT:  PERRL, EOMI, no alar flaring or epistaxis, oral mucosa moist without cyanosis, NECK:  no jugular vein distention, no retractions, , LUNGS: clear ant/lat -- portacath on left chest  , HEART:  Regular rate and rhythm with no MGR; no edema is present- mild sinus tachy , ABDOMEN:  soft with no tenderness, bowel sounds present, EXTREMITIES:  warm with no cyanosis, SKIN:  no jaundice or ecchymosis and NEUROLOGIC:  Alert and oriented     Kelly Kelly MD  [facility] [place]

## 2020-10-10 NOTE — PROGRESS NOTES
[address] Dosing Services: Vancomycin random level evaluation 10/9/20      Consult for antibiotic dosing of Vancomycin by Dr. Shefali Leahy  Levaquin/Cefepime dose change per renal P&T protocol  Pharmacist reviewed antibiotic appropriateness for 82 yo female for indication of CAP  Day of Therapy: 2     Random level: 7.2mcg/mL  Redose with 15mg/kg (1gm)  for level <20mcg/mL  Consider 1gm q16hrs if renal function stabilizes. Random level ordered for tomorrow @1500hrs  Pharmacy to continue to follow daily. Thank you,    Cyndi Low, Pharm D.         Contact: [phone]

## 2020-10-11 ENCOUNTER — APPOINTMENT (OUTPATIENT)
Dept: GENERAL RADIOLOGY | Age: 85
DRG: 208 | End: 2020-10-11
Attending: INTERNAL MEDICINE
Payer: MEDICARE

## 2020-10-11 LAB
ANION GAP SERPL CALC-SCNC: 8 MMOL/L (ref 5–15)
BACTERIA SPEC CULT: ABNORMAL
BASOPHILS # BLD: 0 K/UL (ref 0–0.1)
BASOPHILS NFR BLD: 0 % (ref 0–1)
BUN SERPL-MCNC: 21 MG/DL (ref 6–20)
BUN/CREAT SERPL: 18 (ref 12–20)
CALCIUM SERPL-MCNC: 8.3 MG/DL (ref 8.5–10.1)
CHLORIDE SERPL-SCNC: 101 MMOL/L (ref 97–108)
CO2 SERPL-SCNC: 28 MMOL/L (ref 21–32)
CREAT SERPL-MCNC: 1.18 MG/DL (ref 0.55–1.02)
DIFFERENTIAL METHOD BLD: ABNORMAL
EOSINOPHIL # BLD: 0.1 K/UL (ref 0–0.4)
EOSINOPHIL NFR BLD: 1 % (ref 0–7)
ERYTHROCYTE [DISTWIDTH] IN BLOOD BY AUTOMATED COUNT: 18.7 % (ref 11.5–14.5)
GLUCOSE SERPL-MCNC: 97 MG/DL (ref 65–100)
GRAM STN SPEC: ABNORMAL
HCT VFR BLD AUTO: 34.1 % (ref 35–47)
HGB BLD-MCNC: 10.8 G/DL (ref 11.5–16)
IMM GRANULOCYTES # BLD AUTO: 0.1 K/UL (ref 0–0.04)
IMM GRANULOCYTES NFR BLD AUTO: 0 % (ref 0–0.5)
LYMPHOCYTES # BLD: 1.8 K/UL (ref 0.8–3.5)
LYMPHOCYTES NFR BLD: 16 % (ref 12–49)
MCH RBC QN AUTO: 25.5 PG (ref 26–34)
MCHC RBC AUTO-ENTMCNC: 31.7 G/DL (ref 30–36.5)
MCV RBC AUTO: 80.6 FL (ref 80–99)
MONOCYTES # BLD: 0.8 K/UL (ref 0–1)
MONOCYTES NFR BLD: 7 % (ref 5–13)
NEUTS SEG # BLD: 8.7 K/UL (ref 1.8–8)
NEUTS SEG NFR BLD: 76 % (ref 32–75)
NRBC # BLD: 0 K/UL (ref 0–0.01)
NRBC BLD-RTO: 0 PER 100 WBC
PLATELET # BLD AUTO: 132 K/UL (ref 150–400)
PMV BLD AUTO: 11.5 FL (ref 8.9–12.9)
POTASSIUM SERPL-SCNC: 3.6 MMOL/L (ref 3.5–5.1)
RBC # BLD AUTO: 4.23 M/UL (ref 3.8–5.2)
SERVICE CMNT-IMP: ABNORMAL
SODIUM SERPL-SCNC: 137 MMOL/L (ref 136–145)
WBC # BLD AUTO: 11.5 K/UL (ref 3.6–11)

## 2020-10-11 PROCEDURE — 74011000250 HC RX REV CODE- 250: Performed by: INTERNAL MEDICINE

## 2020-10-11 PROCEDURE — 85025 COMPLETE CBC W/AUTO DIFF WBC: CPT

## 2020-10-11 PROCEDURE — 99231 SBSQ HOSP IP/OBS SF/LOW 25: CPT | Performed by: INTERNAL MEDICINE

## 2020-10-11 PROCEDURE — 74011250637 HC RX REV CODE- 250/637: Performed by: INTERNAL MEDICINE

## 2020-10-11 PROCEDURE — 36415 COLL VENOUS BLD VENIPUNCTURE: CPT

## 2020-10-11 PROCEDURE — 65660000000 HC RM CCU STEPDOWN

## 2020-10-11 PROCEDURE — 71045 X-RAY EXAM CHEST 1 VIEW: CPT

## 2020-10-11 PROCEDURE — 74011250636 HC RX REV CODE- 250/636: Performed by: INTERNAL MEDICINE

## 2020-10-11 PROCEDURE — 94640 AIRWAY INHALATION TREATMENT: CPT

## 2020-10-11 PROCEDURE — 80048 BASIC METABOLIC PNL TOTAL CA: CPT

## 2020-10-11 PROCEDURE — 77030038269 HC DRN EXT URIN PURWCK BARD -A

## 2020-10-11 RX ORDER — LEVOTHYROXINE SODIUM 125 UG/1
125 TABLET ORAL
Status: DISCONTINUED | OUTPATIENT
Start: 2020-10-12 | End: 2020-10-13 | Stop reason: HOSPADM

## 2020-10-11 RX ORDER — CEFDINIR 300 MG/1
300 CAPSULE ORAL EVERY 12 HOURS
Status: DISCONTINUED | OUTPATIENT
Start: 2020-10-11 | End: 2020-10-13 | Stop reason: HOSPADM

## 2020-10-11 RX ORDER — IPRATROPIUM BROMIDE AND ALBUTEROL SULFATE 2.5; .5 MG/3ML; MG/3ML
3 SOLUTION RESPIRATORY (INHALATION)
Status: DISCONTINUED | OUTPATIENT
Start: 2020-10-11 | End: 2020-10-13

## 2020-10-11 RX ORDER — LEVOFLOXACIN 750 MG/1
750 TABLET ORAL
Status: DISCONTINUED | OUTPATIENT
Start: 2020-10-12 | End: 2020-10-11

## 2020-10-11 RX ADMIN — IPRATROPIUM BROMIDE AND ALBUTEROL SULFATE 3 ML: .5; 3 SOLUTION RESPIRATORY (INHALATION) at 15:23

## 2020-10-11 RX ADMIN — CLOPIDOGREL BISULFATE 75 MG: 75 TABLET ORAL at 08:18

## 2020-10-11 RX ADMIN — HEPARIN SODIUM 5000 UNITS: 5000 INJECTION INTRAVENOUS; SUBCUTANEOUS at 21:13

## 2020-10-11 RX ADMIN — BUDESONIDE 500 MCG: 0.5 INHALANT RESPIRATORY (INHALATION) at 19:41

## 2020-10-11 RX ADMIN — IPRATROPIUM BROMIDE AND ALBUTEROL SULFATE 3 ML: .5; 3 SOLUTION RESPIRATORY (INHALATION) at 19:37

## 2020-10-11 RX ADMIN — IPRATROPIUM BROMIDE AND ALBUTEROL SULFATE 3 ML: .5; 3 SOLUTION RESPIRATORY (INHALATION) at 02:29

## 2020-10-11 RX ADMIN — HEPARIN SODIUM 5000 UNITS: 5000 INJECTION INTRAVENOUS; SUBCUTANEOUS at 08:18

## 2020-10-11 RX ADMIN — ASPIRIN 81 MG: 81 TABLET, CHEWABLE ORAL at 08:18

## 2020-10-11 RX ADMIN — ARFORMOTEROL TARTRATE 15 MCG: 15 SOLUTION RESPIRATORY (INHALATION) at 07:53

## 2020-10-11 RX ADMIN — BUDESONIDE 500 MCG: 0.5 INHALANT RESPIRATORY (INHALATION) at 07:53

## 2020-10-11 RX ADMIN — METOPROLOL TARTRATE 25 MG: 25 TABLET, FILM COATED ORAL at 21:12

## 2020-10-11 RX ADMIN — PANTOPRAZOLE SODIUM 40 MG: 40 TABLET, DELAYED RELEASE ORAL at 08:18

## 2020-10-11 RX ADMIN — IPRATROPIUM BROMIDE AND ALBUTEROL SULFATE 3 ML: .5; 3 SOLUTION RESPIRATORY (INHALATION) at 07:45

## 2020-10-11 RX ADMIN — ARFORMOTEROL TARTRATE 15 MCG: 15 SOLUTION RESPIRATORY (INHALATION) at 19:41

## 2020-10-11 RX ADMIN — IPRATROPIUM BROMIDE AND ALBUTEROL SULFATE 3 ML: .5; 3 SOLUTION RESPIRATORY (INHALATION) at 11:37

## 2020-10-11 RX ADMIN — CEFDINIR 300 MG: 300 CAPSULE ORAL at 17:12

## 2020-10-11 NOTE — PROGRESS NOTES
[facility]     Name: Erna Subramanian MRN: [id number]   : 1935 Hospital: [facility], [place]   Date: 10/11/2020        Impression Plan   1. Acute respiratory failure with hypoxia and hypercapnia- extubated on 10/9 and now on ra   2. Bilateral airspace disease initially concerning for PNA- resolved-  Mild lll atx   3. CAD  4. Systolic heart failure- nl lvef on repeat echo   5. RA/SLE  6. History of follicular lymphoma  7. Mild hypotension - currently on lila 10 ucg down from 25 ucg yesterday   8. Mild thrombocytopenia 132k             O2 prn - currently on ra with good sats   · Brovana/Pulmicort, duonebs in place of home inhalers  · Low threshold for steroids- no wheezing currently Broad spectrum abx- vanc/cefepime/levofloxacin- so far bc ngsf and  Sputum nl slim - cxr has cleared except for lll ax  ·  incentive pretty   · Blood cx NGTD  · UA negative  · sputum cultures NGTD  · RVP still pending- no results listed - ? Sent - still no  Results  But may not be necessary now   · COVD 19 neg   · Serial Lactic acid,   · Troponin negative  · D-dimer 2.13  · proBNP- 10 k   · TTE- seen   · Protonix in place of home PPI  · Diet advance now that she is extubated - discussed with rn who said she passed the bedside assessment . - doing ok with that   · I think we can dc the vanco and cefepime and continue levaquin for now . - will change levquin to po          Pt is critically ill and at risk of decompensation in the setting of acute respiratory failure. Critical care time spent with pt exclusive of procedures was 35 minutes  10/11- awake and alert and no complaints. Radiology  ( personally reviewed) CXR pending   ABG No results for input(s): PHI, PO2I, PCO2I in the last 72 hours.    cxr seen - slight hazy left base post extubation but otherwise  Clear     Subjective     Patient is a 81 y.o. female with a history of asthma/COPD, CAD, ICM, systolic heart failure with an EF of 35-40%, RA/SLE, and follicular lymphoma admitted for acute respiratory failure with hypoxia. She was recently admitted to the CCU at [facility] for an NSTEMI and newly diagnosed heart failure. During this admission she was intubated for acute hypoxic respiratory failure that sounds like it was thought to be due to volume overload. COVID was negative on 9/11. She was transferred to [facility] today for acute respiratory requiring intubation. Per report she was in respiratory distress on CPAP/BiPAP when she arrive and was intubated. CXR was concerning for bilateral airspace disease. Outside records reviewed: by suraj Berumen   TTE 9/11: EF 35-40%  LHC negative for culprit lesions, 30% ISR of RCA  CTA 9/11/2020 negative for PE; severe CAD; mild edema with small effusions  CXR 9/13/2020: L basilar atelectasis  COVID 9/11 negative    ABG 10/8 : 7.2/68/535  CXR 10/8: bilateral infiltrates  10/11- mild hazy left base   Progress since extubation discussed with rn . Still on lila for hypotension but on lower dose . Past Medical History:   Diagnosis Date    Asthma     [facility] pulmonary disease [facility] [address]     Hypertension     Hypothyroid     Lupus ([facility] [address]     Non Hodgkin's lymphoma ([facility] [address]       No past surgical history on file. Prior to Admission medications    Medication Sig Start Date End Date Taking? Authorizing Provider   Spiriva Respimat 1.25 mcg/actuation inhaler Take 2 Puffs by inhalation daily. 6/9/20  Yes Provider, Historical   clopidogrel (PLAVIX) 75 mg tab Take 1 Tab by mouth daily. 7/17/19  Yes Miki Pineda NP   aspirin 81 mg chewable tablet Take 81 mg by mouth nightly. Yes Other, MD Poppy   levothyroxine (SYNTHROID) 125 mcg tablet Take 125 mcg by mouth Daily (before breakfast). Yes Provider, Historical   metoprolol succinate (TOPROL-XL) 50 mg XL tablet Take 75 mg by mouth daily.  Indications: paroxysmal supraventricular tachycardia   Yes Provider, Historical   albuterol (PROVENTIL HFA, VENTOLIN HFA, PROAIR HFA) 90 mcg/actuation inhaler Take 2 Puffs by inhalation every six (6) hours as needed for Wheezing. Yes Deena, MD Poppy   fluticasone propion-salmeteroL (Advair Diskus) 100-50 mcg/dose diskus inhaler Take 1 Puff by inhalation every twelve (12) hours. Provider, Historical   ondansetron (ZOFRAN ODT) 8 mg disintegrating tablet Take 8 mg by mouth every eight (8) hours as needed for Nausea. Provider, Historical   aclidinium bromide (TUDORZA PRESSAIR) 400 mcg/actuation inhaler Take 1 Puff by inhalation. Provider, Historical   albuterol sulfate (PROVENTIL;VENTOLIN) 2.5 mg/0.5 mL nebu nebulizer solution by Nebulization route as needed for Wheezing. Provider, Historical   MELOXICAM PO Take 15 mg by mouth daily. Provider, Historical   acetaminophen (TYLENOL) 325 mg tablet Take  by mouth every four (4) hours as needed for Pain. Provider, Historical   bumetanide (BUMEX) 0.5 mg tablet Take 1 Tab by mouth daily. 7/16/19   Antolin Hopkins NP   hydroxychloroquine (PLAQUENIL) 200 mg tablet Take 200 mg by mouth daily. Provider, Historical   lisinopril (PRINIVIL, ZESTRIL) 40 mg tablet Take 40 mg by mouth daily. Provider, Historical   montelukast (SINGULAIR) 10 mg tablet Take 10 mg by mouth daily. Provider, Historical   omeprazole (PRILOSEC) 20 mg capsule Take 20 mg by mouth daily. Provider, Historical   budesonide-formoterol (SYMBICORT) 160-4.5 mcg/actuation HFAA Take 2 Puffs by inhalation two (2) times a day. Provider, Historical   folic acid (FOLVITE) 1 mg tablet Take 1 mg by mouth daily. Deena, MD Poppy   multivitamin (ONE A DAY) tablet Take 1 Tab by mouth daily. Poppy Shaffer MD   atorvastatin (LIPITOR) 20 mg tablet Take 20 mg by mouth daily. Poppy Shaffer MD   alendronate (FOSAMAX) 70 mg tablet Take 70 mg by mouth every seven (7) days. Poppy Shaffer MD   Calcium-Cholecalciferol, D3, (CALCIUM 600 WITH VITAMIN D3) 600 mg(1,500mg) -400 unit cap Take 1 Tab by mouth daily.  Indications: osteoporosis, a condition of weak bones    Other, MD Poppy     Current Facility-Administered Medications   Medication Dose Route Frequency    albuterol-ipratropium (DUO-NEB) 2.5 MG-0.5 MG/3 ML  3 mL Nebulization Q6H RT    pantoprazole (PROTONIX) tablet 40 mg  40 mg Oral DAILY    furosemide (LASIX) tablet 40 mg  40 mg Oral DAILY    heparin (porcine) injection 5,000 Units  5,000 Units SubCUTAneous Q12H    aspirin chewable tablet 81 mg  81 mg Oral DAILY    clopidogreL (PLAVIX) tablet 75 mg  75 mg Oral DAILY    metoprolol tartrate (LOPRESSOR) tablet 25 mg  25 mg Oral Q12H    arformoteroL (BROVANA) neb solution 15 mcg  15 mcg Nebulization BID RT    budesonide (PULMICORT) 500 mcg/2 ml nebulizer suspension  500 mcg Nebulization BID RT    levoFLOXacin (LEVAQUIN) 750 mg in D5W IVPB  750 mg IntraVENous Q48H    PHENYLephrine (RUFINA-SYNEPHRINE) 30 mg in 0.9% sodium chloride 250 mL infusion   mcg/min IntraVENous TITRATE     Allergies   Allergen Reactions    Latex Rash      Social History     Tobacco Use    Smoking status: Never Smoker    Smokeless tobacco: Never Used   Substance Use Topics    Alcohol use: Never     Frequency: Never      Family History   Problem Relation Age of Onset    Heart Disease Mother           Laboratory: I have personally reviewed the critical care flowsheet and labs.      Recent Labs     10/11/20  0238 10/10/20  0343 10/09/20  0512   WBC 11.5* 18.7* 20.5*   HGB 10.8* 11.7 11.4*   HCT 34.1* 36.5 35.8   * 156 143*     Recent Labs     10/11/20  0238 10/10/20  0343 10/09/20  0512 10/08/20  1740    138 133* 133*   K 3.6 3.2* 3.7 3.6    100 100 100   CO2 28 31 22 21   GLU 97 105* 134* 172*   BUN 21* 19 17 13   CREA 1.18* 1.25* 1.18* 1.11*   CA 8.3* 8.4* 8.1* 7.7*   MG  --   --  1.8 1.8   PHOS  --   --  3.0 2.9   ALB  --  3.2*  --   --    ALT  --  20  --   --        Objective:     Mode Rate Tidal Volume Pressure FiO2 PEEP   Spontaneous, CPAP, Pressure support   400 ml  5 cm H2O 40 % 5 cm H20     Vital Signs:    Ventilator Pressures  Pressure Support (cm H2O): 5 cm H2O  PIP Observed (cm H2O): 11 cm H2O  Plateau Pressure (cm H2O): 18 cm H2O  MAP (cm H2O): 7  PEEP/VENT (cm H2O): 5 cm H20  Auto PEEP Observed (cm H2O): 7.5 cm [id number])Ventilator Pressures  Pressure Support (cm H2O): 5 cm H2O  PIP Observed (cm H2O): 11 cm H2O  Plateau Pressure (cm H2O): 18 cm H2O  MAP (cm H2O): 7  PEEP/VENT (cm H2O): 5 cm H20  Auto PEEP Observed (cm H2O): 7.5 cm H2O  Safety & Alarms  Circuit Temperature: (HME)  Backup Mode Checked/Apnea: Yes  Pressure Max: 40 cm H2O  Ve Min: 2  Ve Max: 22  RR Min: 5  RR Max: 40  Intake/Output:   Last shift:      Ventilator Volumes  Vt Set (ml): 400 ml  Vt Exhaled (Machine Breath) (ml): 388 ml  Vt Spont (ml): 675 ml  Ve Observed (l/min): 8.6 l/min  Last 3 shifts: No intake/output data recorded. [email or web address]    Intake/Output Summary (Last 24 hours) at 10/11/2020 0919  Last data filed at 10/11/2020 0700  Gross per 24 hour   Intake 886.37 ml   Output 1300 ml   Net -413.63 ml     EXAM:   GENERAL: awake on ra, thin. . no distress  , HEENT:  PERRL, EOMI, no alar flaring or epistaxix , mouth - eating , NECK:  no jugular vein distention, no retractions, , LUNGS: clear ant/lat -- portacath, HEART:  Regular rate and rhythm with no MGR- mild sinus tachycardia ; no edema is present- , ABDOMEN:  soft with no tenderness, bowel sounds present, EXTREMITIES:  warm with no cyanosis, SKIN:  no jaundice or ecchymosis and NEUROLOGIC:  Alert and oriented     Kym Merida MD  [facility] [place]

## 2020-10-11 NOTE — PROGRESS NOTES
1915 Received report. 0700 Bedside shift change report given to SHELDON Sanchez .  Report included the following information SBAR, Kardex, ED Summary, Intake/Output, MAR, Accordion, Recent Results, Med Rec Status and Cardiac Rhythm SR.

## 2020-10-11 NOTE — PROGRESS NOTES
[facility]  [address], [place], [address] [facility]  [phone]      Medical Progress Note      NAME: Lyric Montoya   :  1935  MRM:  [id number]    Date of service: 10/11/2020  1:54 PM       Assessment and Plan:   1. Acute respiratory failure with hypoxia and hypercapnea. Extubated on 10/09. Resolved. Off O2.      2.  Abnormal CXR. ? Pneumonia. Repeated CXR showed resolution of infiltrate. Now only on levaquin. SARS-CoV-2 is negative. Check cultures      3. Acute on chronic diastolic CHF. Holding diuretic IV due to hypotension. Planned for cardiac cath on Monday. Cardiac MRI if cath unrevealing. Echo EF is normal, but grade one diastolic dysfunction. 4.  Elevated troponin/ recent NSTEMI/ CAD s/p PCI in 2019. On ASA and plavix. Palnned for cardiac cath on Monday      5. Elevated lactic acid. Resolved. Continue ABx       6. SLE: will resume hydroxychloroquine       7. Hypothyroidism:  Continue synthroid. Check TSH     8. HTN: holding home BP meds due to hypotension. Is on metoprolol, lisinopril at home. On lila for hypotension.              Subjective:     Chief Complaint[de-identified] Patient was seen and examined as a follow up for acute respiratory failure. Chart was reviewed. feels better. Wants to get out of bed. ROS:  (bold if positive, if negative)    Cough Tolerating PT  Tolerating Diet        Objective:     Last 24hrs VS reviewed since prior progress note.  Most recent are:    Visit Vitals  BP (!) 127/46   Pulse 98   Temp 98.4 °F (36.9 °C)   Resp 18   Ht 5' 2\" (1.575 m)   Wt 58.3 kg (128 lb 8.5 oz)   SpO2 95%   BMI 23.51 kg/m²     SpO2 Readings from Last 6 Encounters:   10/11/20 95%   20 98%   19 98%   19 97%   19 98%   19 97%    O2 Flow Rate (L/min): 2 l/min       Intake/Output Summary (Last 24 hours) at 10/11/2020 0930  Last data filed at 10/11/2020 0700  Gross per 24 hour   Intake 886.37 ml   Output 1050 ml   Net -163.63 ml Physical Exam:    Gen:  Well-developed, well-nourished, in no acute distress  HEENT:  Pink conjunctivae, PERRL, hearing intact to voice, moist mucous membranes  Neck:  Supple, without masses, thyroid non-tender  Resp:  No accessory muscle use, clear breath sounds without wheezes rales or rhonchi  Card:  Grade 3/6 systolic murmur, normal S1, S2 without thrills, bruits or peripheral edema  Abd:  Soft, non-tender, non-distended, normoactive bowel sounds are present, no palpable organomegaly and no detectable hernias  Lymph:  No cervical or inguinal adenopathy  Musc:  No cyanosis or clubbing  Skin:  No rashes or ulcers, skin turgor is good  Neuro:  Cranial nerves are grossly intact, no focal motor weakness, follows commands appropriatelyintubated and sedatedtubated and sedated  __________________________________________________________________  Medications Reviewed: (see below)  Medications:     Current Facility-Administered Medications   Medication Dose Route Frequency    albuterol-ipratropium (DUO-NEB) 2.5 MG-0.5 MG/3 ML  3 mL Nebulization Q6H RT    pantoprazole (PROTONIX) tablet 40 mg  40 mg Oral DAILY    furosemide (LASIX) tablet 40 mg  40 mg Oral DAILY    heparin (porcine) injection 5,000 Units  5,000 Units SubCUTAneous Q12H    aspirin chewable tablet 81 mg  81 mg Oral DAILY    clopidogreL (PLAVIX) tablet 75 mg  75 mg Oral DAILY    metoprolol tartrate (LOPRESSOR) tablet 25 mg  25 mg Oral Q12H    arformoteroL (BROVANA) neb solution 15 mcg  15 mcg Nebulization BID RT    budesonide (PULMICORT) 500 mcg/2 ml nebulizer suspension  500 mcg Nebulization BID RT    levoFLOXacin (LEVAQUIN) 750 mg in D5W IVPB  750 mg IntraVENous Q48H    PHENYLephrine (RUFINA-SYNEPHRINE) 30 mg in 0.9% sodium chloride 250 mL infusion   mcg/min IntraVENous TITRATE        Lab Data Reviewed: (see below)  Lab Review:     Recent Labs     10/11/20  0238 10/10/20  0343 10/09/20  0512   WBC 11.5* 18.7* 20.5*   HGB 10.8* 11.7 11.4* HCT 34.1* 36.5 35.8   * 156 143*     Recent Labs     10/11/20  0238 10/10/20  0343 10/09/20  0512 10/08/20  1740    138 133* 133*   K 3.6 3.2* 3.7 3.6    100 100 100   CO2 28 31 22 21   GLU 97 105* 134* 172*   BUN 21* 19 17 13   CREA 1.18* 1.25* 1.18* 1.11*   CA 8.3* 8.4* 8.1* 7.7*   MG  --   --  1.8 1.8   PHOS  --   --  3.0 2.9   ALB  --  3.2*  --   --    TBILI  --  0.9  --   --    ALT  --  20  --   --      No results found for: GLUCPOC  Recent Labs     10/09/20  0650 10/08/20  1640   PH 7.44 7.37   PCO2 31* 36   PO2 155* 129*   HCO3 20* 20*   FIO2 40 40     No results for input(s): INR, INREXT, INREXT in the last 72 hours. All Micro Results     Procedure Component Value Units Date/Time    CULTURE, BLOOD, PAIRED [[id number]] Collected:  10/08/20 1652    Order Status:  Completed Specimen:  Blood Updated:  10/11/20 0518     Special Requests: NO SPECIAL REQUESTS        Culture result: NO GROWTH 3 DAYS       CULTURE, MRSA [[id number]] Collected:  10/08/20 1611    Order Status:  Completed Specimen:  Nasal from Nares Updated:  10/09/20 2115     Special Requests: NO SPECIAL REQUESTS        Culture result: MRSA NOT PRESENT                   Screening of patient nares for MRSA is for surveillance purposes and, if positive, to facilitate isolation considerations in high risk settings. It is not intended for automatic decolonization interventions per se as regimens are not sufficiently effective to warrant routine use.           CULTURE, RESPIRATORY/SPUTUM/BRONCH Aleene Schultze STAIN [[id number]]  (Abnormal) Collected:  10/08/20 1749    Order Status:  Completed Specimen:  Sputum,ET Suction Updated:  10/09/20 1343     Special Requests: NO SPECIAL REQUESTS        GRAM STAIN OCCASIONAL WBCS SEEN               OCCASIONAL EPITHELIAL CELLS SEEN            1+ GRAM NEGATIVE RODS               OCCASIONAL GRAM POSITIVE COCCI IN PAIRS                  OCCASIONAL GRAM NEGATIVE DIPLOCOCCI           Culture result: LIGHT GRAM NEGATIVE RODS               LIGHT NORMAL RESPIRATORY AJMES SO FAR          RESPIRATORY PANEL,PCR,NASOPHARYNGEAL [[id number]] Collected:  10/08/20 1630    Order Status:  Canceled Specimen:  NASOPHARYNGEAL SWAB           I have reviewed notes of prior 24hr. Other pertinent lab:      Total time spent with patient: Jay[place] 59 discussed with: Patient, Nursing Staff and >50% of time spent in counseling and coordination of care    Discussed:  Care Plan    Prophylaxis:  Hep SQ    Disposition:  Home w/Family           ___________________________________________________    Attending Physician: Akash Brewer MD

## 2020-10-11 NOTE — PROGRESS NOTES
5947: Report from Cornelio Reis RN.    0800: Provided and assisted client with breakfast tray. Client adjusted up in bed. Held metoprolol and Lasix as client remains on neosynephrine. Client states her hands re shaking. She experienced similar symptoms yesterday with mild tachycardia post nebulizer treatments. 0900: Client resting comfortably. No distress. 0930: Spoke with Dr. Nazia Chavarria and will attempt to take client off [address] [place] [address] Street today. If blood pressure remains within normal limits and client feels strong enough we will attempt to move client from bed to bed/chair position, to recliner. Client is activly using incentive spirometry. 1045: Client resting comfortably. No distress. Vitals stable. 1245: Client eating lunch tray. No distress. Vitals stable. 1345: Client's blood pressure has remained stable off of neosynephrine. Client's bed placed in chair position. 1615: Client resting comfortably. No distress. Vitals stable. 1700: Client moved to Recliner and tolerated move well. Minimal assist. Vitals stable. 1745: Blood pressure has maintained in the 055 systolic since moving and client appears to be in no distress.

## 2020-10-11 NOTE — PROGRESS NOTES
Cardiology Daily Progress Note      Rickie Modi is a 81 y.o. female with PMH of CAD/NSTEMI 7/15/19, HTN, HLD, lupus, CKD, CLARISSE, COPD,  anemia admitted with hypoxic respiratory failure. Feeling better.      Visit Vitals  BP (!) 122/50   Pulse 88   Temp 98.4 °F (36.9 °C)   Resp 19   Ht 5' 2\" (1.575 m)   Wt 128 lb 8.5 oz (58.3 kg)   SpO2 97%   BMI 23.51 kg/m²       Intake/Output Summary (Last 24 hours) at 10/11/2020 1118  Last data filed at 10/11/2020 0915  Gross per 24 hour   Intake 1546.37 ml   Output 1200 ml   Net 346.37 ml     General appearance - alert, well appearing, and in no distress  Mental status - affect appropriate to mood  Eyes - sclera anicteric, moist mucous membranes  Neck - supple, no significant adenopathy  Chest - clear to auscultation, no wheezes, rales or rhonchi  Heart - normal rate, regular rhythm, normal S1, S2, no murmurs, rubs, clicks or gallops  Abdomen - soft, nontender, nondistended, no masses or organomegaly  Extremities - peripheral pulses normal, no pedal edema    Current Facility-Administered Medications   Medication Dose Route Frequency    albuterol-ipratropium (DUO-NEB) 2.5 MG-0.5 MG/3 ML  3 mL Nebulization QID RT    [START ON 10/12/2020] levoFLOXacin (LEVAQUIN) tablet 750 mg  750 mg Oral Q48H    pantoprazole (PROTONIX) tablet 40 mg  40 mg Oral DAILY    furosemide (LASIX) tablet 40 mg  40 mg Oral DAILY    heparin (porcine) injection 5,000 Units  5,000 Units SubCUTAneous Q12H    aspirin chewable tablet 81 mg  81 mg Oral DAILY    clopidogreL (PLAVIX) tablet 75 mg  75 mg Oral DAILY    metoprolol tartrate (LOPRESSOR) tablet 25 mg  25 mg Oral Q12H    arformoteroL (BROVANA) neb solution 15 mcg  15 mcg Nebulization BID RT    budesonide (PULMICORT) 500 mcg/2 ml nebulizer suspension  500 mcg Nebulization BID RT    PHENYLephrine (RUFINA-SYNEPHRINE) 30 mg in 0.9% sodium chloride 250 mL infusion   mcg/min IntraVENous TITRATE        PAD form completed 8/29/19    Lipids 7/16/19 - TC 91, TG 95, HDL 31, LDL 41, VLDL 91      Cardiac Testing/ Procedures:     A. Cardiac Cath/PCI:R Radial access  RCA - JR4  LCA - difficulty to access with TIG4;EBU 3.5     R CFA - micropuncture/ultrasound/fluorscopy guided access     L Main: Ca++; Ostial LM 10% ( good reflux/no dampening)     LAD: Med; MLI; D1 - MLI     LCflex: Med; MLI     RCA: Dominant  Prox 95%; Mid 50%; PDA and PLB - small; PLB branch diffuse severe dz        LVEDP:  15     LVEF: Not assessed; Nml by echo     No significant gradient across aortic valve.     PCI: JR4 guide ( with SH)  BMW wire  Pre dil with 2 by 12  VINOD 2.5 by 26  Post dil with 2.75 by 12 ( at high merary - approx 3mm)  GERALD 3 before and after           Specimens Removed : None     Closure Device: TR Band - R radial     R CFA - mynx           B. ECHO/AUNDREA: 7//15/19 - EF 56-60%; Mild AR, TR, MD, MR. Mod Pulm HTN     C. StressNuclear/Stress ECHO/Stress test:     D. Vascular:     E. EP:     F. Miscellaneous:    Assessment:    - Acute on Chronic respiratory failure  - Acute on chronic systolic CHF  - Recent NSTEMI  - CAD/ MI, PCI RCA 2019  - CKD  - Lupus  - COPD  - Lymphoma      Plan:     - Plan for [facility] on Monday. - Wean Albert. Holding Lopressor and Lasix until weaned. - IVF challenge upto 1L if HR goes above 120 after Alebrt is weaned.            ___________________________________________________    Mena Skeva.  Brigette Thakur MD, [facility]

## 2020-10-12 LAB
ACT BLD: 169 SECS (ref 79–138)
ANION GAP SERPL CALC-SCNC: 8 MMOL/L (ref 5–15)
BASOPHILS # BLD: 0 K/UL (ref 0–0.1)
BASOPHILS NFR BLD: 0 % (ref 0–1)
BNP SERPL-MCNC: 1999 PG/ML
BUN SERPL-MCNC: 21 MG/DL (ref 6–20)
BUN/CREAT SERPL: 17 (ref 12–20)
CALCIUM SERPL-MCNC: 8.7 MG/DL (ref 8.5–10.1)
CHLORIDE SERPL-SCNC: 101 MMOL/L (ref 97–108)
CO2 SERPL-SCNC: 27 MMOL/L (ref 21–32)
CREAT SERPL-MCNC: 1.24 MG/DL (ref 0.55–1.02)
DIFFERENTIAL METHOD BLD: ABNORMAL
EOSINOPHIL # BLD: 0.4 K/UL (ref 0–0.4)
EOSINOPHIL NFR BLD: 5 % (ref 0–7)
ERYTHROCYTE [DISTWIDTH] IN BLOOD BY AUTOMATED COUNT: 18.3 % (ref 11.5–14.5)
GLUCOSE SERPL-MCNC: 100 MG/DL (ref 65–100)
HCT VFR BLD AUTO: 33.8 % (ref 35–47)
HGB BLD-MCNC: 10.8 G/DL (ref 11.5–16)
IMM GRANULOCYTES # BLD AUTO: 0 K/UL (ref 0–0.04)
IMM GRANULOCYTES NFR BLD AUTO: 0 % (ref 0–0.5)
LYMPHOCYTES # BLD: 1.4 K/UL (ref 0.8–3.5)
LYMPHOCYTES NFR BLD: 17 % (ref 12–49)
MAGNESIUM SERPL-MCNC: 1.8 MG/DL (ref 1.6–2.4)
MCH RBC QN AUTO: 25.7 PG (ref 26–34)
MCHC RBC AUTO-ENTMCNC: 32 G/DL (ref 30–36.5)
MCV RBC AUTO: 80.5 FL (ref 80–99)
MONOCYTES # BLD: 0.6 K/UL (ref 0–1)
MONOCYTES NFR BLD: 7 % (ref 5–13)
NEUTS SEG # BLD: 5.7 K/UL (ref 1.8–8)
NEUTS SEG NFR BLD: 71 % (ref 32–75)
NRBC # BLD: 0 K/UL (ref 0–0.01)
NRBC BLD-RTO: 0 PER 100 WBC
PHOSPHATE SERPL-MCNC: 3.5 MG/DL (ref 2.6–4.7)
PLATELET # BLD AUTO: 126 K/UL (ref 150–400)
POTASSIUM SERPL-SCNC: 4.1 MMOL/L (ref 3.5–5.1)
RBC # BLD AUTO: 4.2 M/UL (ref 3.8–5.2)
SODIUM SERPL-SCNC: 136 MMOL/L (ref 136–145)
WBC # BLD AUTO: 8 K/UL (ref 3.6–11)

## 2020-10-12 PROCEDURE — B240ZZ3 ULTRASONOGRAPHY OF SINGLE CORONARY ARTERY, INTRAVASCULAR: ICD-10-PCS | Performed by: STUDENT IN AN ORGANIZED HEALTH CARE EDUCATION/TRAINING PROGRAM

## 2020-10-12 PROCEDURE — 74011250637 HC RX REV CODE- 250/637: Performed by: NURSE PRACTITIONER

## 2020-10-12 PROCEDURE — 36415 COLL VENOUS BLD VENIPUNCTURE: CPT

## 2020-10-12 PROCEDURE — C1894 INTRO/SHEATH, NON-LASER: HCPCS | Performed by: STUDENT IN AN ORGANIZED HEALTH CARE EDUCATION/TRAINING PROGRAM

## 2020-10-12 PROCEDURE — 93458 L HRT ARTERY/VENTRICLE ANGIO: CPT | Performed by: STUDENT IN AN ORGANIZED HEALTH CARE EDUCATION/TRAINING PROGRAM

## 2020-10-12 PROCEDURE — 83735 ASSAY OF MAGNESIUM: CPT

## 2020-10-12 PROCEDURE — 99232 SBSQ HOSP IP/OBS MODERATE 35: CPT | Performed by: SPECIALIST

## 2020-10-12 PROCEDURE — 74011250637 HC RX REV CODE- 250/637: Performed by: INTERNAL MEDICINE

## 2020-10-12 PROCEDURE — 77030029065 HC DRSG HEMO QCLOT ZMED -B

## 2020-10-12 PROCEDURE — 74011000636 HC RX REV CODE- 636: Performed by: STUDENT IN AN ORGANIZED HEALTH CARE EDUCATION/TRAINING PROGRAM

## 2020-10-12 PROCEDURE — 99152 MOD SED SAME PHYS/QHP 5/>YRS: CPT | Performed by: STUDENT IN AN ORGANIZED HEALTH CARE EDUCATION/TRAINING PROGRAM

## 2020-10-12 PROCEDURE — 84100 ASSAY OF PHOSPHORUS: CPT

## 2020-10-12 PROCEDURE — 94640 AIRWAY INHALATION TREATMENT: CPT

## 2020-10-12 PROCEDURE — 80048 BASIC METABOLIC PNL TOTAL CA: CPT

## 2020-10-12 PROCEDURE — 74011250636 HC RX REV CODE- 250/636: Performed by: STUDENT IN AN ORGANIZED HEALTH CARE EDUCATION/TRAINING PROGRAM

## 2020-10-12 PROCEDURE — 83880 ASSAY OF NATRIURETIC PEPTIDE: CPT

## 2020-10-12 PROCEDURE — 99153 MOD SED SAME PHYS/QHP EA: CPT | Performed by: STUDENT IN AN ORGANIZED HEALTH CARE EDUCATION/TRAINING PROGRAM

## 2020-10-12 PROCEDURE — C1769 GUIDE WIRE: HCPCS | Performed by: STUDENT IN AN ORGANIZED HEALTH CARE EDUCATION/TRAINING PROGRAM

## 2020-10-12 PROCEDURE — 74011000250 HC RX REV CODE- 250: Performed by: INTERNAL MEDICINE

## 2020-10-12 PROCEDURE — 77030015766: Performed by: STUDENT IN AN ORGANIZED HEALTH CARE EDUCATION/TRAINING PROGRAM

## 2020-10-12 PROCEDURE — 92978 ENDOLUMINL IVUS OCT C 1ST: CPT | Performed by: STUDENT IN AN ORGANIZED HEALTH CARE EDUCATION/TRAINING PROGRAM

## 2020-10-12 PROCEDURE — 4A023N7 MEASUREMENT OF CARDIAC SAMPLING AND PRESSURE, LEFT HEART, PERCUTANEOUS APPROACH: ICD-10-PCS | Performed by: STUDENT IN AN ORGANIZED HEALTH CARE EDUCATION/TRAINING PROGRAM

## 2020-10-12 PROCEDURE — 77030038269 HC DRN EXT URIN PURWCK BARD -A

## 2020-10-12 PROCEDURE — C1887 CATHETER, GUIDING: HCPCS | Performed by: STUDENT IN AN ORGANIZED HEALTH CARE EDUCATION/TRAINING PROGRAM

## 2020-10-12 PROCEDURE — 74011000250 HC RX REV CODE- 250: Performed by: STUDENT IN AN ORGANIZED HEALTH CARE EDUCATION/TRAINING PROGRAM

## 2020-10-12 PROCEDURE — B2111ZZ FLUOROSCOPY OF MULTIPLE CORONARY ARTERIES USING LOW OSMOLAR CONTRAST: ICD-10-PCS | Performed by: STUDENT IN AN ORGANIZED HEALTH CARE EDUCATION/TRAINING PROGRAM

## 2020-10-12 PROCEDURE — C1753 CATH, INTRAVAS ULTRASOUND: HCPCS | Performed by: STUDENT IN AN ORGANIZED HEALTH CARE EDUCATION/TRAINING PROGRAM

## 2020-10-12 PROCEDURE — 77030008543 HC TBNG MON PRSS MRTM -A: Performed by: STUDENT IN AN ORGANIZED HEALTH CARE EDUCATION/TRAINING PROGRAM

## 2020-10-12 PROCEDURE — 77030019569 HC BND COMPR RAD TERU -B: Performed by: STUDENT IN AN ORGANIZED HEALTH CARE EDUCATION/TRAINING PROGRAM

## 2020-10-12 PROCEDURE — 85025 COMPLETE CBC W/AUTO DIFF WBC: CPT

## 2020-10-12 PROCEDURE — 74011250636 HC RX REV CODE- 250/636: Performed by: INTERNAL MEDICINE

## 2020-10-12 PROCEDURE — 65660000000 HC RM CCU STEPDOWN

## 2020-10-12 PROCEDURE — 85347 COAGULATION TIME ACTIVATED: CPT

## 2020-10-12 RX ORDER — LIDOCAINE HYDROCHLORIDE 10 MG/ML
INJECTION INFILTRATION; PERINEURAL AS NEEDED
Status: DISCONTINUED | OUTPATIENT
Start: 2020-10-12 | End: 2020-10-12 | Stop reason: HOSPADM

## 2020-10-12 RX ORDER — LISINOPRIL 5 MG/1
5 TABLET ORAL
Status: DISCONTINUED | OUTPATIENT
Start: 2020-10-12 | End: 2020-10-13 | Stop reason: HOSPADM

## 2020-10-12 RX ORDER — SODIUM CHLORIDE 0.9 % (FLUSH) 0.9 %
5-40 SYRINGE (ML) INJECTION AS NEEDED
Status: DISCONTINUED | OUTPATIENT
Start: 2020-10-12 | End: 2020-10-13 | Stop reason: HOSPADM

## 2020-10-12 RX ORDER — HYDROXYCHLOROQUINE SULFATE 200 MG/1
200 TABLET, FILM COATED ORAL DAILY
Status: DISCONTINUED | OUTPATIENT
Start: 2020-10-13 | End: 2020-10-13 | Stop reason: HOSPADM

## 2020-10-12 RX ORDER — ATORVASTATIN CALCIUM 20 MG/1
20 TABLET, FILM COATED ORAL DAILY
Status: DISCONTINUED | OUTPATIENT
Start: 2020-10-13 | End: 2020-10-13 | Stop reason: HOSPADM

## 2020-10-12 RX ORDER — ACETAMINOPHEN 325 MG/1
650 TABLET ORAL
Status: DISCONTINUED | OUTPATIENT
Start: 2020-10-12 | End: 2020-10-13 | Stop reason: HOSPADM

## 2020-10-12 RX ORDER — SODIUM CHLORIDE 0.9 % (FLUSH) 0.9 %
5-40 SYRINGE (ML) INJECTION EVERY 8 HOURS
Status: DISCONTINUED | OUTPATIENT
Start: 2020-10-12 | End: 2020-10-13 | Stop reason: HOSPADM

## 2020-10-12 RX ORDER — MIDAZOLAM HYDROCHLORIDE 1 MG/ML
INJECTION, SOLUTION INTRAMUSCULAR; INTRAVENOUS AS NEEDED
Status: DISCONTINUED | OUTPATIENT
Start: 2020-10-12 | End: 2020-10-12 | Stop reason: HOSPADM

## 2020-10-12 RX ORDER — VERAPAMIL HYDROCHLORIDE 2.5 MG/ML
INJECTION, SOLUTION INTRAVENOUS AS NEEDED
Status: DISCONTINUED | OUTPATIENT
Start: 2020-10-12 | End: 2020-10-12 | Stop reason: HOSPADM

## 2020-10-12 RX ORDER — FENTANYL CITRATE 50 UG/ML
INJECTION, SOLUTION INTRAMUSCULAR; INTRAVENOUS AS NEEDED
Status: DISCONTINUED | OUTPATIENT
Start: 2020-10-12 | End: 2020-10-12 | Stop reason: HOSPADM

## 2020-10-12 RX ORDER — HEPARIN SODIUM 1000 [USP'U]/ML
INJECTION, SOLUTION INTRAVENOUS; SUBCUTANEOUS AS NEEDED
Status: DISCONTINUED | OUTPATIENT
Start: 2020-10-12 | End: 2020-10-12 | Stop reason: HOSPADM

## 2020-10-12 RX ORDER — HEPARIN SODIUM 200 [USP'U]/100ML
INJECTION, SOLUTION INTRAVENOUS
Status: COMPLETED | OUTPATIENT
Start: 2020-10-12 | End: 2020-10-12

## 2020-10-12 RX ORDER — MONTELUKAST SODIUM 10 MG/1
10 TABLET ORAL DAILY
Status: DISCONTINUED | OUTPATIENT
Start: 2020-10-13 | End: 2020-10-13 | Stop reason: HOSPADM

## 2020-10-12 RX ORDER — FOLIC ACID 1 MG/1
1 TABLET ORAL DAILY
Status: DISCONTINUED | OUTPATIENT
Start: 2020-10-13 | End: 2020-10-13 | Stop reason: HOSPADM

## 2020-10-12 RX ADMIN — CLOPIDOGREL BISULFATE 75 MG: 75 TABLET ORAL at 08:00

## 2020-10-12 RX ADMIN — BUDESONIDE 500 MCG: 0.5 INHALANT RESPIRATORY (INHALATION) at 07:52

## 2020-10-12 RX ADMIN — ASPIRIN 81 MG: 81 TABLET, CHEWABLE ORAL at 08:00

## 2020-10-12 RX ADMIN — Medication 10 ML: at 22:02

## 2020-10-12 RX ADMIN — IPRATROPIUM BROMIDE AND ALBUTEROL SULFATE 3 ML: .5; 3 SOLUTION RESPIRATORY (INHALATION) at 07:47

## 2020-10-12 RX ADMIN — CEFDINIR 300 MG: 300 CAPSULE ORAL at 08:00

## 2020-10-12 RX ADMIN — IPRATROPIUM BROMIDE AND ALBUTEROL SULFATE 3 ML: .5; 3 SOLUTION RESPIRATORY (INHALATION) at 15:55

## 2020-10-12 RX ADMIN — IPRATROPIUM BROMIDE AND ALBUTEROL SULFATE 3 ML: .5; 3 SOLUTION RESPIRATORY (INHALATION) at 11:19

## 2020-10-12 RX ADMIN — METOPROLOL TARTRATE 25 MG: 25 TABLET, FILM COATED ORAL at 22:01

## 2020-10-12 RX ADMIN — FUROSEMIDE 40 MG: 40 TABLET ORAL at 15:23

## 2020-10-12 RX ADMIN — HEPARIN SODIUM 5000 UNITS: 5000 INJECTION INTRAVENOUS; SUBCUTANEOUS at 22:02

## 2020-10-12 RX ADMIN — Medication 10 ML: at 13:31

## 2020-10-12 RX ADMIN — ARFORMOTEROL TARTRATE 15 MCG: 15 SOLUTION RESPIRATORY (INHALATION) at 20:00

## 2020-10-12 RX ADMIN — BUDESONIDE 500 MCG: 0.5 INHALANT RESPIRATORY (INHALATION) at 20:00

## 2020-10-12 RX ADMIN — CEFDINIR 300 MG: 300 CAPSULE ORAL at 22:01

## 2020-10-12 RX ADMIN — ARFORMOTEROL TARTRATE 15 MCG: 15 SOLUTION RESPIRATORY (INHALATION) at 07:52

## 2020-10-12 RX ADMIN — LEVOTHYROXINE SODIUM 125 MCG: 0.12 TABLET ORAL at 08:00

## 2020-10-12 RX ADMIN — METOPROLOL TARTRATE 25 MG: 25 TABLET, FILM COATED ORAL at 08:01

## 2020-10-12 RX ADMIN — IPRATROPIUM BROMIDE AND ALBUTEROL SULFATE 3 ML: .5; 3 SOLUTION RESPIRATORY (INHALATION) at 19:40

## 2020-10-12 RX ADMIN — LISINOPRIL 5 MG: 5 TABLET ORAL at 22:01

## 2020-10-12 RX ADMIN — PANTOPRAZOLE SODIUM 40 MG: 40 TABLET, DELAYED RELEASE ORAL at 08:01

## 2020-10-12 RX ADMIN — ACETAMINOPHEN 650 MG: 325 TABLET ORAL at 12:10

## 2020-10-12 NOTE — PROGRESS NOTES
TRANSFER - IN REPORT:    Verbal report received from Laura(name) on [place]  being received from ICU(unit) for routine progression of care      Report consisted of patients Situation, Background, Assessment and   Recommendations(SBAR). Information from the following report(s) SBAR, Kardex, ED Summary, Intake/Output, MAR, Recent Results and Cardiac Rhythm nsr was reviewed with the receiving nurse. Opportunity for questions and clarification was provided. Assessment completed upon patients arrival to unit and care assumed. 1730  Pt arrived to the unit. Bedside shift change report given to Fuad Kemp (oncoming nurse) by Ita Cardenas (offgoing nurse). Report included the following information SBAR, Kardex, ED Summary, Intake/Output, MAR, Recent Results and Cardiac Rhythm NSR.

## 2020-10-12 NOTE — PROGRESS NOTES
10/12/2020  12:44 PM  Pt si Re-admission  Update via chart review, pt is continuing to require medical management for acute respiratory failure w/ hypoxia and hypercapnea,acute on chronic CHF, elevated troponin, HTN, hypothyroid. Transitions of Care Plan:  LOS 4 days, RUR 14 %  1. Acute respiratory failure, resolved, pt extubated, off O2  2. L heart cath today, cards following  3. CM to follow through for treatment/response  4. DC when stable to home, pt lives alone, pt's family lives nearby, assists w/ transport  5. Pt would benefit from [place]  for disease education, med rec  6. Pt resides outside of [facility]  7. Outpatient f/u PCP, cardiology  8.  Family will transport    JESSI Mcgrath

## 2020-10-12 NOTE — PROCEDURES
BRIEF PROCEDURE NOTE    Date of Procedure: 10/12/2020   Preoperative Diagnosis: pulmonary edema, cad  Postoperative Diagnosis: same    Procedure: Left heart cath, coronary angiography,ivus  Interventional Cardiologist: Parul Connolly DO  Assistant: None  Anesthesia: local + IV moderate sedation   I administered moderate sedation throughout this procedure. An independent trained observer pushed medications at my direction, and monitored the patients level of consciousness and physiological status throughout. Estimated Blood Loss: Minimal    Access: right radial artery, 5F. Unable to engage left coronary from right radial.  Right CFA, 5F  Catheters:  Left coronary: JL 3.5, 5F (tried jl 3.5, al 1, tig 3, tig 4, BU 3.5, and Art. runthrough wire was used to stabilize catheter during angiography  Right coronary: JR4, 5F    Findings:   L Main: large caliber, ostial calcified stenosis with MLA 8.2mm2 with eccentric calcium throughout left main that is non-occlusive  LAD: large caliber vessel, small d1 and moderate d2, no critical disease  LCx: large caliber, large om1, small om2 and small om3, no critical disease  RCA: moderate caliber, previously placed proximal RCA stent patent, small PDA and small RP branch    LVEDP:  12  mmhg        Specimens Removed: None    Implants: n/a    Closure Device: radial TR band    See full cath note. Complications: none      Findings:  1. Multivessel cad with left main ostial stenosis with MLA 8.2 mm2 and patent RCA stent  2.  Borderline elevation in lvedp    Plan:    [facility] medical [facility]    DO Karol Martinez DO  [facility] [address] [place] [address]                                              Office [phone],YAY [phone]

## 2020-10-12 NOTE — PROGRESS NOTES
1915: Bedside and Verbal shift change report given to Ruddy Saeed RN (oncoming nurse) by Mahi Robles RN (offgoing nurse). Report included the following information SBAR, Kardex, Intake/Output, MAR and Recent Results. 0715: Bedside and Verbal shift change report given to Leti Chambers RN (oncoming nurse) by Ruddy Saeed RN (offgoing nurse). Report included the following information SBAR, Kardex, Intake/Output, MAR and Recent Results. Central line Type: port    Central Line Insert Date: 10/8    Reason Central Line Placed: hemodynamically unstable, requiring monitoring lines, or volume resuscitation    Central Line Dressing Date: 10/8    Biopatch in place? Yes No: yes    Tubing labeled and appropriate? Yes No: yes    Alcohol caps on all open ports?  Yes No: yes    Last G bath (time&date): 10/12 0805    Reviewed with provider and central line must stay in for the following reasons:

## 2020-10-12 NOTE — PROGRESS NOTES
7831: Pt shaved, given CHG bath. Bed pad changed. Dentures in cup @ bedside in case w/ pt label as well as glasses, white stone earrings & purple stone ring. Pt to cath lab @ this time. 1050: Pt returned from cath lab, Alda Gotti RN received pt from cath lab RN. R radial site CDI w/ TR band in place. R groin site CDI w/ quickclot/tegaderm in place. PNV intact to RUE/RLE. Pt has no complaints of pain. Pt to lie supine until 1418 & aware of this precaution. Daughter @ bedside. Attempted to remove 2cc air from TR band @ 1050, oozing noted. 2cc air replaced, palpable radial pulse. Will attempt again @ 1120 per order. 1120: Attempted to remove 2cc air from TR band @ 1050, oozing noted. 2cc air replaced, palpable radial pulse. Will attempt again @ 1150 per order. 1150: 2cc air removed from TR band per order. Site CDI w/ old drainage. PNV intact. 1215: 2cc air removed from TR band per order. Site CDI w/ old drainage. PNV intact. Daughter assisting pt w/ lunch in reverse trendelenburg. 1230: 3cc air removed from TR band per order. Site CDI w/ old drainage. PNV intact. 1245: 3cc air removed from TR band per order. TR band removed, small amt of ecchymosis to site, hemostatic. [address] [facility] [address] placed. Pt requested to have arm immobilizer placed back on as a reminder. PNV intact. 1707: TRANSFER - OUT REPORT:    Verbal report given to [facility] RN (name) on [place]  being transferred to [facility] 329 (unit) for routine progression of care       Report consisted of patients Situation, Background, Assessment and   Recommendations(SBAR). Information from the following report(s) SBAR, Kardex, Intake/Output, Recent Results and Cardiac Rhythm NSR was reviewed with the receiving nurse.     Lines:   Venous Access Device 07/15/19 Upper chest (subclavicular area, right (Active)       Venous Access Device power port 10/08/20 Upper chest (subclavicular area, right (Active)   Central Line Being Utilized Yes 10/12/20 1500 Criteria for Appropriate Use Other (comment) 10/12/20 1500   Site Assessment Clean, dry, & intact 10/12/20 1500   Date of Last Dressing Change 10/08/20 10/12/20 1500   Dressing Status Clean, dry, & intact 10/12/20 1500   Dressing Type Disk with Chlorhexadine gluconate (CHG); Transparent 10/12/20 1500   Action Taken Open ports on tubing capped 10/12/20 1500   Date Accessed (Medial Site) 10/08/20 10/12/20 1500   Positive Blood Return (Medial Site) Yes 10/12/20 1500   Action Taken (Medial Site) Flushed 10/12/20 1500   Alcohol Cap Used Yes 10/12/20 1500        Opportunity for questions and clarification was provided. Patient transported with:   Monitor  Registered Nurse     Belongings- dentures, glasses, jewelry (purple stone ring/white stone earrings). Left VM for pt's daughter Crist Leyden notifying of transfer & gave number to call nurses station on new unit.

## 2020-10-12 NOTE — PROGRESS NOTES
Pt has been transferred out of the ICU and has no further PCCM issues. Pulmonary will sign off. Please reconsult if needed.

## 2020-10-12 NOTE — PROGRESS NOTES
Cardiology Progress Note         NAME:  [place]   :   1935   MR  N:   [id number]     Assessment/Plan:   1. A/C systolic CHF: EF has now normalized: cont po lasix. Resume GDMT as hemodynamics allow. Katrina pBNP. Cath to reassess CORS this am.   2. Recent NSTEMI: () with 30 % in stent stenosis and mild stenosis distal to the stent at [facility]/No PCI. Cont BB. 3. Acute resp failure: now on room air   4. CAD s/p PCI to RCA 2019. Cont asa/plavix   5. HTN: hypotensive this admission. Albert off 10/11. 6. COPD  7. Anemia: stable   8. CKD: stable    CARDIOLOGY ATTENDING  Patient personally seen and examined. All the elements of history and examination were personally performed. Assessment and plan was discussed and agree as written above    Doing OK post-cath. No CP or SOB. Hrt RRR, no murmur, lungs clear, no edema. Will treat medically for HFrEF and CAD - she is volume compensated and LV has improved. Linnea Van MD, [facility]          Subjective:   [place] is a 81 y.o.   female  with PMH of CAD/NSTEMI 7/15/19, HTN, HLD, lupus, CKD, CLARISSE, COPD,  anemia who was transferred from [facility] for acute hypoxic resp failure. In [facility] she was initially placed on Bi Pap then emergently intubated. Last seen in our office in . Per record review she was admitted to [facility] in  with acute resp failure, decline in EF(35-40). She ruled in for NSTEMI (troponin peak 0.27), was intubated. Cardiac cath showed 30% in stent stenosis of prior RCA stent and mild stenosis distal to the stent, nml LVEDP. ACE was held in setting of KYE, discharged on lasix 20 mg prn. COVID at that time was negative.      Extubated 10/9 in pm  Albert off 10/11  Cr 1.254  pbnp pending         Cardiac ROS: Had mid sternal chest pain over night, resolved spontaneously.  Patient denies any exertional chest pain, dyspnea, palpitations, syncope, orthopnea, edema or paroxysmal nocturnal dyspnea. Previous Cardiac Eval  PAD form completed 19    Lipids 19 - TC 91, TG 95, HDL 31, LDL 41, VLDL 91      Cardiac Testing/ Procedures:     A. Cardiac Cath/PCI:R Radial access  RCA - JR4  LCA - difficulty to access with TIG4;EBU 3.5     R CFA - micropuncture/ultrasound/fluorscopy guided access     L Main: Ca++; Ostial LM 10% ( good reflux/no dampening)     LAD: Med; MLI; D1 - MLI     LCflex: Med; MLI     RCA: Dominant  Prox 95%; Mid 50%; PDA and PLB - small; PLB branch diffuse severe dz        LVEDP:  15     LVEF: Not assessed; Nml by echo     No significant gradient across aortic valve.     PCI: JR4 guide ( with SH)  BMW wire  Pre dil with 2 by 12  VINOD 2.5 by 26  Post dil with 2.75 by 12 ( at high merary - approx 3mm)  GERALD 3 before and after           Specimens Removed : None     Closure Device: TR Band - R radial     R CFA - mynx           B. ECHO/AUNDREA: 7//15/19 - EF 56-60%; Mild AR, TR, MI, MR. Mod Pulm HTN     C. StressNuclear/Stress ECHO/Stress test:     D. Vascular:     E. EP:     F. Miscellaneous:  10/08/20   ECHO ADULT COMPLETE 10/09/2020 10/9/2020    Narrative · LV: Estimated LVEF is 60 - 65%. Normal cavity size, wall thickness and   systolic function (ejection fraction normal). Wall motion: normal. Mild   (grade 1) left ventricular diastolic dysfunction. · MV: Mild mitral annular calcification. Signed by: Mouna Martins MD           Review of Systems: NP cough this am. No nausea, indigestion, vomiting, sputum. No bleeding. NPO for cath. Objective:     Visit Vitals  BP (!) 127/46   Pulse 73   Temp 98.3 °F (36.8 °C)   Resp 17   Ht 5' 2\" (1.575 m)   Wt 56.3 kg (124 lb 1.9 oz)   SpO2 93%   BMI 22.70 kg/m²    O2 Flow Rate (L/min): 0 l/min O2 Device: CPAP nasal    Temp (24hrs), Av.3 °F (36.8 °C), Min:97.4 °F (36.3 °C), Max:98.8 °F (37.1 °C)      No intake/output data recorded.     10/10 190 - 10/12 0700  In: 1085.6 [P.O.:660; I.V.:425.6]  Out: 1300 [Urine:1300]  TELE: SR    General: AAOx3 cooperative, no acute distress. HEENT: Atraumatic. Pink and moist.  Anicteric sclerae. Neck : Supple  Lungs: CTA bilaterally. Heart: Regular rhythm, no murmur, no rubs, no gallops. No JVD. No carotid bruits. Abdomen: Soft, non-distended, non-tender. + Bowel sounds. Extremities: No edema  Neurologic: Grossly intact. Alert and oriented X 3. No acute neurological distress. Psych: Good insight. Not anxious or agitated. Care Plan discussed with:    Comments   Patient x    Family      RN x    Care Manager                    Consultant:          Data Review:     No lab exists for component: ITNL   No results for input(s): CPK, CKMB, TROIQ in the last 72 hours. Recent Labs     10/12/20  0432 10/11/20  0238 10/10/20  0343    137 138   K 4.1 3.6 3.2*    101 100   CO2 27 28 31   BUN 21* 21* 19   CREA 1.24* 1.18* 1.25*    97 105*   PHOS 3.5  --   --    MG 1.8  --   --    ALB  --   --  3.2*   WBC 8.0 11.5* 18.7*   HGB 10.8* 10.8* 11.7   HCT 33.8* 34.1* 36.5   * 132* 156     No results for input(s): INR, PTP, APTT, INREXT in the last 72 hours.     Medications reviewed  Current Facility-Administered Medications   Medication Dose Route Frequency    levothyroxine (SYNTHROID) tablet 125 mcg  125 mcg Oral ACB    albuterol-ipratropium (DUO-NEB) 2.5 MG-0.5 MG/3 ML  3 mL Nebulization QID RT    cefdinir (OMNICEF) capsule 300 mg  300 mg Oral Q12H    pantoprazole (PROTONIX) tablet 40 mg  40 mg Oral DAILY    furosemide (LASIX) tablet 40 mg  40 mg Oral DAILY    heparin (porcine) injection 5,000 Units  5,000 Units SubCUTAneous Q12H    aspirin chewable tablet 81 mg  81 mg Oral DAILY    clopidogreL (PLAVIX) tablet 75 mg  75 mg Oral DAILY    metoprolol tartrate (LOPRESSOR) tablet 25 mg  25 mg Oral Q12H    arformoteroL (BROVANA) neb solution 15 mcg  15 mcg Nebulization BID RT    budesonide (PULMICORT) 500 mcg/2 ml nebulizer suspension  500 mcg Nebulization BID RT    PHENYLephrine (RUFINA-SYNEPHRINE) 30 mg in 0.9% sodium chloride 250 mL infusion   mcg/min IntraVENous TITRATE         Miladis Issa NP

## 2020-10-12 NOTE — PROGRESS NOTES
1- Primary RN Laura unable to take report. Took report for RN from Nora Lau in cath lab. When primary RN available will provide with this update. Right Radial accessed with TR band (start removing air @ 1050) and Right Groin- sheath removed, hemastasis applied per RN. Pt must lay flat for 3 hours.

## 2020-10-12 NOTE — PROGRESS NOTES
1008: Blood aspirated from sheath then arterial sheath pulled 5 Fr Right Groin. Jo [place] applied. Manual pressure held by Rod Moore RN.

## 2020-10-12 NOTE — CARDIO/PULMONARY
[facility] Cardiopulmonary Rehab: 80 yo female transferred from [facility] in [place], with respiratory failure requiring emergent intubation (10/8). Per Dr Tho Lao, dx includes: Acute on chronic CHF. S/P cardiac cath; no PCI (10/12). LVEF 60-65% by echo (10/9/20). Cardiac hx includes recent NSTEMI at [facility] (9/11/20), and MI with PCI to RCA (2019). Pt is eligible for outpatient cardiac rehab, due to recent MI. She resides in [facility], which is closer to [place]. Info on cardiac rehab and [facility] Cardiac Rehab program, has been attached to pt's discharge instructions.

## 2020-10-12 NOTE — PROGRESS NOTES
Qasim [address] 80  [address] [facility], [address] [place] [address]  [phone]      Medical Progress Note      NAME: Elvira Duran   :  1935  MRM:  [id number]    Date of service: 10/12/2020  1:54 PM       Assessment and Plan:   1. Acute respiratory failure with hypoxia and hypercapnea. Extubated on 10/09. Resolved. Off O2.      2.  Abnormal CXR. ? Pneumonia. Repeated CXR showed resolution of infiltrate. Now only on levaquin. SARS-CoV-2 is negative. Check cultures      3. Acute on chronic diastolic CHF. Holding diuretic IV due to hypotension. Planned for cardiac cath on Monday. Cardiac MRI if cath unrevealing. Echo EF is normal, but grade one diastolic dysfunction. 4.  Elevated troponin/ recent NSTEMI/ CAD s/p PCI in 2019. On ASA and plavix. Had cardiac cath on 10/12 and no stent required. 5.  Elevated lactic acid. Resolved. Continue ABx       6. SLE: will resume hydroxychloroquine       7. Hypothyroidism:  Continue synthroid. Check TSH     8. HTN: holding home BP meds due to hypotension. Is on metoprolol, lisinopril at home. On lila for hypotension.              Subjective:     Chief Complaint[de-identified] Patient was seen and examined as a follow up for acute respiratory failure. Chart was reviewed. feels better after cardiac cath. ROS:  (bold if positive, if negative)    Cough Tolerating PT  Tolerating Diet        Objective:     Last 24hrs VS reviewed since prior progress note.  Most recent are:    Visit Vitals  /75 (BP 1 Location: Left arm, BP Patient Position: At rest)   Pulse 77   Temp 97.8 °F (36.6 °C)   Resp 18   Ht 5' 2\" (1.575 m)   Wt 56.3 kg (124 lb 1.9 oz)   SpO2 95%   BMI 22.70 kg/m²     SpO2 Readings from Last 6 Encounters:   10/12/20 95%   20 98%   19 98%   19 97%   19 98%   19 97%    O2 Flow Rate (L/min): 2 l/min       Intake/Output Summary (Last 24 hours) at 10/12/2020 1308  Last data filed at 10/12/2020 5082  Gross per 24 hour   Intake 19.21 ml   Output 675 ml   Net -655.79 ml        Physical Exam:    Gen:  Well-developed, well-nourished, in no acute distress  HEENT:  Pink conjunctivae, PERRL, hearing intact to voice, moist mucous membranes  Neck:  Supple, without masses, thyroid non-tender  Resp:  No accessory muscle use, clear breath sounds without wheezes rales or rhonchi  Card:  Grade 3/6 systolic murmur, normal S1, S2 without thrills, bruits or peripheral edema  Abd:  Soft, non-tender, non-distended, normoactive bowel sounds are present, no palpable organomegaly and no detectable hernias  Lymph:  No cervical or inguinal adenopathy  Musc:  No cyanosis or clubbing  Skin:  No rashes or ulcers, skin turgor is good  Neuro:  Cranial nerves are grossly intact, no focal motor weakness, follows commands appropriatelyintubated and sedatedtubated and sedated  __________________________________________________________________  Medications Reviewed: (see below)  Medications:     Current Facility-Administered Medications   Medication Dose Route Frequency    influenza vaccine 2020-21 (6 mos+)(PF) (FLUARIX/FLULAVAL/FLUZONE QUAD) injection 0.5 mL  0.5 mL IntraMUSCular PRIOR TO DISCHARGE    sodium chloride (NS) flush 5-40 mL  5-40 mL IntraVENous Q8H    sodium chloride (NS) flush 5-40 mL  5-40 mL IntraVENous PRN    acetaminophen (TYLENOL) tablet 650 mg  650 mg Oral Q4H PRN    levothyroxine (SYNTHROID) tablet 125 mcg  125 mcg Oral ACB    albuterol-ipratropium (DUO-NEB) 2.5 MG-0.5 MG/3 ML  3 mL Nebulization QID RT    cefdinir (OMNICEF) capsule 300 mg  300 mg Oral Q12H    pantoprazole (PROTONIX) tablet 40 mg  40 mg Oral DAILY    furosemide (LASIX) tablet 40 mg  40 mg Oral DAILY    heparin (porcine) injection 5,000 Units  5,000 Units SubCUTAneous Q12H    aspirin chewable tablet 81 mg  81 mg Oral DAILY    clopidogreL (PLAVIX) tablet 75 mg  75 mg Oral DAILY    metoprolol tartrate (LOPRESSOR) tablet 25 mg  25 mg Oral Q12H    arformoteroL (BROVANA) neb solution 15 mcg  15 mcg Nebulization BID RT    budesonide (PULMICORT) 500 mcg/2 ml nebulizer suspension  500 mcg Nebulization BID RT    PHENYLephrine (RUFINA-SYNEPHRINE) 30 mg in 0.9% sodium chloride 250 mL infusion   mcg/min IntraVENous TITRATE        Lab Data Reviewed: (see below)  Lab Review:     Recent Labs     10/12/20  0432 10/11/20  0238 10/10/20  0343   WBC 8.0 11.5* 18.7*   HGB 10.8* 10.8* 11.7   HCT 33.8* 34.1* 36.5   * 132* 156     Recent Labs     10/12/20  0432 10/11/20  0238 10/10/20  0343    137 138   K 4.1 3.6 3.2*    101 100   CO2 27 28 31    97 105*   BUN 21* 21* 19   CREA 1.24* 1.18* 1.25*   CA 8.7 8.3* 8.4*   MG 1.8  --   --    PHOS 3.5  --   --    ALB  --   --  3.2*   TBILI  --   --  0.9   ALT  --   --  20     No results found for: GLUCPOC  No results for input(s): PH, PCO2, PO2, HCO3, FIO2 in the last 72 hours. No results for input(s): INR, INREXT, INREXT in the last 72 hours.   All Micro Results     Procedure Component Value Units Date/Time    CULTURE, BLOOD, PAIRED [[id number]] Collected:  10/08/20 1652    Order Status:  Completed Specimen:  Blood Updated:  10/12/20 0408     Special Requests: NO SPECIAL REQUESTS        Culture result: NO GROWTH 4 DAYS       CULTURE, RESPIRATORY/SPUTUM/BRONCH W GRAM STAIN [[id number]]  (Abnormal)  (Susceptibility) Collected:  10/08/20 6819    Order Status:  Completed Specimen:  Sputum,ET Suction Updated:  10/11/20 1044     Special Requests: NO SPECIAL REQUESTS        GRAM STAIN OCCASIONAL WBCS SEEN               OCCASIONAL EPITHELIAL CELLS SEEN            1+ GRAM NEGATIVE RODS               OCCASIONAL GRAM POSITIVE COCCI IN PAIRS                  OCCASIONAL GRAM NEGATIVE DIPLOCOCCI           Culture result: LIGHT ESCHERICHIA COLI               LIGHT NORMAL RESPIRATORY JAMES            FEW YEAST       CULTURE, MRSA [[id number]] Collected:  10/08/20 1611    Order Status:  Completed Specimen:  Nasal from Nares Updated: 10/09/20 2115     Special Requests: NO SPECIAL REQUESTS        Culture result: MRSA NOT PRESENT                   Screening of patient nares for MRSA is for surveillance purposes and, if positive, to facilitate isolation considerations in high risk settings. It is not intended for automatic decolonization interventions per se as regimens are not sufficiently effective to warrant routine use. RESPIRATORY PANEL,PCR,NASOPHARYNGEAL [[id number]] Collected:  10/08/20 1630    Order Status:  Canceled Specimen:  NASOPHARYNGEAL SWAB           I have reviewed notes of prior 24hr. Other pertinent lab:      Total time spent with patient: [place] 60 discussed with: Patient, Nursing Staff and >50% of time spent in counseling and coordination of care    Discussed:  Care Plan    Prophylaxis:  Hep SQ    Disposition:  Home w/Family           ___________________________________________________    Attending Physician: Francisca Lowry MD

## 2020-10-12 NOTE — PROGRESS NOTES
10:03 AM    TRANSFER - IN REPORT:    Verbal report received from [facility] [employer] [facility] Brian RADER RN(name) on [place]  being received from [address] [facility] (unit) for routine post - op      Report consisted of patients Situation, Background, Assessment and   Recommendations(SBAR). Information from the following report(s) Procedure Summary was reviewed with the receiving nurse. Opportunity for questions and clarification was provided. Assessment completed upon patients arrival to unit and care assumed. 10:18 AM    Hemostasis achieved at 10:18 AM. Dressing applied. Pt voices understanding of post procedure bedrest instructions. 10:26 AM    TRANSFER - OUT REPORT:    Verbal report given to [facility] RN(name) on [place]  being transferred to ICU (unit) for routine progression of care       Report consisted of patients Situation, Background, Assessment and   Recommendations(SBAR). Information from the following report(s) Procedure Summary, Recent Results and Cardiac Rhythm NSR was reviewed with the receiving nurse. Lines:   Venous Access Device 07/15/19 Upper chest (subclavicular area, right (Active)       Venous Access Device power port 10/08/20 Upper chest (subclavicular area, right (Active)   Central Line Being Utilized Yes 10/12/20 0758   Criteria for Appropriate Use Other (comment) 10/12/20 0758   Site Assessment Clean, dry, & intact 10/12/20 0758   Date of Last Dressing Change 10/09/20 10/12/20 0758   Dressing Status Clean, dry, & intact 10/12/20 0758   Dressing Type Disk with Chlorhexadine gluconate (CHG); Transparent 10/12/20 0758   Action Taken Open ports on tubing capped 10/12/20 0758   Date Accessed (Medial Site) 10/08/20 10/12/20 0758   Positive Blood Return (Medial Site) Yes 10/12/20 0758   Action Taken (Medial Site) Flushed 10/12/20 0758   Alcohol Cap Used Yes 10/12/20 0758       Peripheral IV 10/08/20 Distal;Left;Posterior Wrist (Active)   Site Assessment Clean, dry, & intact 10/12/20 8792 Phlebitis Assessment 0 10/12/20 0758   Infiltration Assessment 0 10/12/20 0758   Dressing Status Clean, dry, & intact 10/12/20 0758   Dressing Type Transparent 10/12/20 0758   Hub Color/Line Status Blue 10/12/20 0758   Action Taken Open ports on tubing capped 10/12/20 0758   Alcohol Cap Used Yes 10/12/20 0758        Opportunity for questions and clarification was provided.       Patient transported with:   Registered Nurse

## 2020-10-13 VITALS
TEMPERATURE: 98.5 F | WEIGHT: 125.3 LBS | SYSTOLIC BLOOD PRESSURE: 106 MMHG | RESPIRATION RATE: 18 BRPM | HEART RATE: 86 BPM | HEIGHT: 62 IN | OXYGEN SATURATION: 96 % | DIASTOLIC BLOOD PRESSURE: 54 MMHG | BODY MASS INDEX: 23.06 KG/M2

## 2020-10-13 PROBLEM — J96.21 ACUTE ON CHRONIC RESPIRATORY FAILURE WITH HYPOXIA (HCC): Status: RESOLVED | Noted: 2020-10-08 | Resolved: 2020-10-13

## 2020-10-13 LAB
BACTERIA SPEC CULT: NORMAL
SERVICE CMNT-IMP: NORMAL

## 2020-10-13 PROCEDURE — 74011000250 HC RX REV CODE- 250: Performed by: INTERNAL MEDICINE

## 2020-10-13 PROCEDURE — 74011250637 HC RX REV CODE- 250/637: Performed by: INTERNAL MEDICINE

## 2020-10-13 PROCEDURE — 97116 GAIT TRAINING THERAPY: CPT

## 2020-10-13 PROCEDURE — 74011250636 HC RX REV CODE- 250/636: Performed by: INTERNAL MEDICINE

## 2020-10-13 PROCEDURE — 97162 PT EVAL MOD COMPLEX 30 MIN: CPT

## 2020-10-13 PROCEDURE — 99232 SBSQ HOSP IP/OBS MODERATE 35: CPT | Performed by: SPECIALIST

## 2020-10-13 PROCEDURE — 94640 AIRWAY INHALATION TREATMENT: CPT

## 2020-10-13 RX ORDER — METOPROLOL TARTRATE 25 MG/1
25 TABLET, FILM COATED ORAL EVERY 12 HOURS
Qty: 30 TAB | Refills: 0 | Status: SHIPPED | OUTPATIENT
Start: 2020-10-13 | End: 2021-03-23 | Stop reason: ALTCHOICE

## 2020-10-13 RX ORDER — IPRATROPIUM BROMIDE AND ALBUTEROL SULFATE 2.5; .5 MG/3ML; MG/3ML
3 SOLUTION RESPIRATORY (INHALATION)
Status: DISCONTINUED | OUTPATIENT
Start: 2020-10-13 | End: 2020-10-13 | Stop reason: HOSPADM

## 2020-10-13 RX ORDER — HEPARIN 100 UNIT/ML
300 SYRINGE INTRAVENOUS AS NEEDED
Status: DISCONTINUED | OUTPATIENT
Start: 2020-10-13 | End: 2020-10-13 | Stop reason: HOSPADM

## 2020-10-13 RX ORDER — CEFDINIR 300 MG/1
300 CAPSULE ORAL EVERY 12 HOURS
Qty: 10 CAP | Refills: 0 | Status: SHIPPED | OUTPATIENT
Start: 2020-10-13 | End: 2021-01-22 | Stop reason: ALTCHOICE

## 2020-10-13 RX ORDER — LISINOPRIL 5 MG/1
5 TABLET ORAL
Qty: 30 TAB | Refills: 1 | Status: SHIPPED | OUTPATIENT
Start: 2020-10-13 | End: 2021-03-23

## 2020-10-13 RX ORDER — FUROSEMIDE 40 MG/1
40 TABLET ORAL DAILY
Qty: 30 TAB | Refills: 0 | Status: SHIPPED | OUTPATIENT
Start: 2020-10-13

## 2020-10-13 RX ADMIN — FOLIC ACID 1 MG: 1 TABLET ORAL at 09:18

## 2020-10-13 RX ADMIN — FUROSEMIDE 40 MG: 40 TABLET ORAL at 09:21

## 2020-10-13 RX ADMIN — CEFDINIR 300 MG: 300 CAPSULE ORAL at 09:22

## 2020-10-13 RX ADMIN — PANTOPRAZOLE SODIUM 40 MG: 40 TABLET, DELAYED RELEASE ORAL at 09:21

## 2020-10-13 RX ADMIN — ASPIRIN 81 MG: 81 TABLET, CHEWABLE ORAL at 09:23

## 2020-10-13 RX ADMIN — METOPROLOL TARTRATE 25 MG: 25 TABLET, FILM COATED ORAL at 10:45

## 2020-10-13 RX ADMIN — IPRATROPIUM BROMIDE AND ALBUTEROL SULFATE 3 ML: .5; 3 SOLUTION RESPIRATORY (INHALATION) at 08:17

## 2020-10-13 RX ADMIN — Medication 300 UNITS: at 14:26

## 2020-10-13 RX ADMIN — ATORVASTATIN CALCIUM 20 MG: 20 TABLET, FILM COATED ORAL at 09:19

## 2020-10-13 RX ADMIN — MONTELUKAST SODIUM 10 MG: 10 TABLET, FILM COATED ORAL at 09:18

## 2020-10-13 RX ADMIN — Medication 10 ML: at 05:57

## 2020-10-13 RX ADMIN — Medication 10 ML: at 14:26

## 2020-10-13 RX ADMIN — CLOPIDOGREL BISULFATE 75 MG: 75 TABLET ORAL at 09:19

## 2020-10-13 RX ADMIN — BUDESONIDE 500 MCG: 0.5 INHALANT RESPIRATORY (INHALATION) at 08:22

## 2020-10-13 RX ADMIN — HYDROXYCHLOROQUINE SULFATE 200 MG: 200 TABLET, FILM COATED ORAL at 09:23

## 2020-10-13 RX ADMIN — HEPARIN SODIUM 5000 UNITS: 5000 INJECTION INTRAVENOUS; SUBCUTANEOUS at 10:46

## 2020-10-13 RX ADMIN — ARFORMOTEROL TARTRATE 15 MCG: 15 SOLUTION RESPIRATORY (INHALATION) at 08:22

## 2020-10-13 RX ADMIN — LEVOTHYROXINE SODIUM 125 MCG: 0.12 TABLET ORAL at 05:57

## 2020-10-13 NOTE — NURSE NAVIGATOR
Met with patient at bedside. Introduced self and role of HF NN. Assessed patient's current understanding of hospitalization and heart failure. Patient educated using teach back method. Patient states when she first came in, the providers thought she could have pneumonia component but have determined it was congestive heart failure. She noted a 2 lb weight gain the day leading up to hospitalization and had taken a bumex which she had ordered to take as needed. She notes that the 0.5 mg bumex at home did not cause increase in urine output. She currently had diuresed well in the hospital and is feeling much better. Reviewed basic heart function and  systolic HF in setting of CAD. Reviewed symptoms of HF using HF magnet. Patient does have a scale and can correctly describe how to be accurate with daily weight. She can state weight gain parameters. Discussed HF zones with emphasis on early recognition of symptoms and notification of provider. Reviewed furosemide as new diuretic medication and current dose of taking 40 mg daily. Patient concerned with possible dehydration so reviewed with her to notify Dr. Humberto Serrato for sudden weight loss accompanied by dizziness/weakness upon standing, low BP, or increase in HR. Patient does have BP cuff and requested she keep log with weight, BP, and heart rate and take to follow up Cardiology appointment. Patient does know her medications by name, her daughter helps her set up a weekly pill box, and she voices good compliance with taking her medications. Reviewed relationship of sodium to fluid congestion and symptoms. Discussed hidden sources of sodium and foods to avoid. She notes her granddaughter does her grocery shopping and she has been trying to make healthy choices. Reviewed recommendation of fresh fruits, fresh or frozen vegetables, fresh meats, low fat dairy. Patient was given Living with HF Education Book, HF magnet, and HF calendar.

## 2020-10-13 NOTE — PROGRESS NOTES
Cardiology Progress Note         NAME:  [place]   :   1935   MR  N:   [id number]     Assessment/Plan:   1. A/C systolic CHF: EF has now normalized: cont po lasix. Resume GDMT as hemodynamics allow. Katrina pBNP. Cath revealed Multivessel cad with left main ostial stenosis with MLA 8.2 mm2 and patent RCA stent. Essential that she is on daily diuretics going forward. D/W pt at length this am. Cont BB/low dose ACE-I. Has OP follow up. Consider changing to entresto as OP once BP improves. 2. CAD/Recent NSTEMI: () with 30 % in stent stenosis and mild stenosis distal to the stent at [facility]/No PCI. Cont BB/asa/statin/plavix   3. Acute resp failure: resolved. Now on room air   4. HTN:   5. COPD  6. Anemia: stable   7. CKD: stable    Ok for d/c today after she sees PT. D/W RN. CARDIOLOGY ATTENDING  Patient personally seen and examined. All the elements of history and examination were personally performed. Assessment and plan was discussed and agree as written above    She feels OK today. Hrt RRR, lungs clear, abd soft, NT, no edema   Continue cardiomyopathy and CAD meds. She is volume compensated. OK for discharge     Lien Echols MD, [facility] [place]            Subjective:   [place] is a 81 y.o.   female  with PMH of CAD/NSTEMI 7/15/19, HTN, HLD, lupus, CKD, CLARISSE, COPD,  anemia who was transferred from [place] for acute hypoxic resp failure. In [place] she was initially placed on Bi Pap then emergently intubated. Last seen in our office in . Per record review she was admitted to [facility] in  with acute resp failure, decline in EF(35-40). She ruled in for NSTEMI (troponin peak 0.27), was intubated. Cardiac cath showed 30% in stent stenosis of prior RCA stent and mild stenosis distal to the stent, nml LVEDP. ACE was held in setting of KYE, discharged on lasix 20 mg prn.  COVID at that time was negative.            Cardiac ROS:  Patient denies any exertional chest pain, dyspnea, palpitations, syncope, orthopnea, edema or paroxysmal nocturnal dyspnea. Previous Cardiac Eval  PAD form completed 19    Lipids 19 - TC 91, TG 95, HDL 31, LDL 41, VLDL 91      Cardiac Testing/ Procedures:     A. Cardiac Cath/PCI:R Radial access  RCA - JR4  LCA - difficulty to access with TIG4;EBU 3.5     R CFA - micropuncture/ultrasound/fluorscopy guided access     L Main: Ca++; Ostial LM 10% ( good reflux/no dampening)     LAD: Med; MLI; D1 - MLI     LCflex: Med; MLI     RCA: Dominant  Prox 95%; Mid 50%; PDA and PLB - small; PLB branch diffuse severe dz        LVEDP:  15     LVEF: Not assessed; Nml by echo     No significant gradient across aortic valve.     PCI: JR4 guide ( with SH)  BMW wire  Pre dil with 2 by 12  VINOD 2.5 by 26  Post dil with 2.75 by 12 ( at high merary - approx 3mm)  GERALD 3 before and after           Specimens Removed : None     Closure Device: TR Band - R radial     R CFA - mynx           B. ECHO/AUNDREA: 7//15/19 - EF 56-60%; Mild AR, TR, SC, MR. Mod Pulm HTN     C. StressNuclear/Stress ECHO/Stress test:     D. Vascular:     E. EP:     F. Miscellaneous:  10/08/20   ECHO ADULT COMPLETE 10/09/2020 10/9/2020    Narrative · LV: Estimated LVEF is 60 - 65%. Normal cavity size, wall thickness and   systolic function (ejection fraction normal). Wall motion: normal. Mild   (grade 1) left ventricular diastolic dysfunction. · MV: Mild mitral annular calcification. Signed by: Krissy Decker MD           Review of Systems: NP cough this am. No nausea, indigestion, vomiting, sputum. No bleeding. Taking po.           Objective:     Visit Vitals  BP (!) 103/55 (BP 1 Location: Left arm, BP Patient Position: At rest)   Pulse 79   Temp 98.3 °F (36.8 °C)   Resp 18   Ht 5' 2\" (1.575 m)   Wt 56.3 kg (124 lb 1.9 oz)   SpO2 93%   BMI 22.70 kg/m²    O2 Flow Rate (L/min): 2 l/min O2 Device: Room air    Temp (24hrs), Av °F (36.7 °C), Min:97.6 °F (36.4 °C), Max:98.4 °F (36.9 °C)      No intake/output data recorded. 10/11 1901 - 10/13 0700  In: 680 [P.O.:680]  Out: 1300 [Urine:1300]  TELE:     General: AAOx3 cooperative, no acute distress. HEENT: Atraumatic. Pink and moist.  Anicteric sclerae. Neck : Supple  Lungs: CTA bilaterally. Heart: Regular rhythm, no murmur, no rubs, no gallops. No JVD. No carotid bruits. Abdomen: Soft, non-distended, non-tender. + Bowel sounds. Extremities: No edema  R groin: soft no hematoma no ecchymosis. Neurologic: Grossly intact. Alert and oriented X 3. No acute neurological distress. Psych: Good insight. Not anxious or agitated. Care Plan discussed with:    Comments   Patient x    Family  x dtr 10/12   RN x    Care Manager                    Consultant:          Data Review:     No lab exists for component: ITNL   No results for input(s): CPK, CKMB, TROIQ in the last 72 hours. Recent Labs     10/12/20  0432 10/11/20  0238    137   K 4.1 3.6    101   CO2 27 28   BUN 21* 21*   CREA 1.24* 1.18*    97   PHOS 3.5  --    MG 1.8  --    WBC 8.0 11.5*   HGB 10.8* 10.8*   HCT 33.8* 34.1*   * 132*     No results for input(s): INR, PTP, APTT, INREXT, INREXT in the last 72 hours.     Medications reviewed  Current Facility-Administered Medications   Medication Dose Route Frequency    atorvastatin (LIPITOR) tablet 20 mg  20 mg Oral DAILY    folic acid (FOLVITE) tablet 1 mg  1 mg Oral DAILY    hydrOXYchloroQUINE (PLAQUENIL) tablet 200 mg  200 mg Oral DAILY    montelukast (SINGULAIR) tablet 10 mg  10 mg Oral DAILY    influenza vaccine 2020-21 (6 mos+)(PF) (FLUARIX/FLULAVAL/FLUZONE QUAD) injection 0.5 mL  0.5 mL IntraMUSCular PRIOR TO DISCHARGE    sodium chloride (NS) flush 5-40 mL  5-40 mL IntraVENous Q8H    sodium chloride (NS) flush 5-40 mL  5-40 mL IntraVENous PRN    acetaminophen (TYLENOL) tablet 650 mg  650 mg Oral Q4H PRN    lisinopriL (PRINIVIL, ZESTRIL) tablet 5 mg  5 mg Oral QHS    levothyroxine (SYNTHROID) tablet 125 mcg  125 mcg Oral ACB    albuterol-ipratropium (DUO-NEB) 2.5 MG-0.5 MG/3 ML  3 mL Nebulization QID RT    cefdinir (OMNICEF) capsule 300 mg  300 mg Oral Q12H    pantoprazole (PROTONIX) tablet 40 mg  40 mg Oral DAILY    furosemide (LASIX) tablet 40 mg  40 mg Oral DAILY    heparin (porcine) injection 5,000 Units  5,000 Units SubCUTAneous Q12H    aspirin chewable tablet 81 mg  81 mg Oral DAILY    clopidogreL (PLAVIX) tablet 75 mg  75 mg Oral DAILY    metoprolol tartrate (LOPRESSOR) tablet 25 mg  25 mg Oral Q12H    arformoteroL (BROVANA) neb solution 15 mcg  15 mcg Nebulization BID RT    budesonide (PULMICORT) 500 mcg/2 ml nebulizer suspension  500 mcg Nebulization BID RT    PHENYLephrine (RUFINA-SYNEPHRINE) 30 mg in 0.9% sodium chloride 250 mL infusion   mcg/min IntraVENous TITRATE         Julianna Nguyen NP

## 2020-10-13 NOTE — PROGRESS NOTES
[facility]  [address] [facility] [place], [address] [place] [address]  [phone]      Medical Progress Note      NAME: Joseline Delgado   :  1935  MRM:  [id number]    Date of service: 10/13/2020  1:54 PM       Assessment and Plan:   1. Acute respiratory failure with hypoxia and hypercapnea. Extubated on 10/09. Resolved. Off O2.      2.  Abnormal CXR. ? Pneumonia. Repeated CXR showed resolution of infiltrate. On cefdinir for 5 more days. SARS-CoV-2 is negative. Check cultures      3. Acute on chronic diastolic CHF. S/p cardiac cath and is unremarkable. Echo EF is normal, but grade one diastolic dysfunction. 4.  Elevated troponin/ recent NSTEMI/ CAD s/p PCI in 2019. On ASA and plavix. Had cardiac cath on 10/12 and no stent required. 5.  Elevated lactic acid. Resolved. Continue ABx       6. SLE: will resume hydroxychloroquine       7. Hypothyroidism:  Continue synthroid. Check TSH     8. HTN: holding home BP meds due to hypotension.              Subjective:     Chief Complaint[de-identified] Patient was seen and examined as a follow up for acute respiratory failure. Chart was reviewed. feels well. ROS:  (bold if positive, if negative)    Cough Tolerating PT  Tolerating Diet        Objective:     Last 24hrs VS reviewed since prior progress note.  Most recent are:    Visit Vitals  /62 (BP 1 Location: Left arm, BP Patient Position: At rest)   Pulse 88   Temp 98.1 °F (36.7 °C)   Resp 18   Ht 5' 2\" (1.575 m)   Wt 56.3 kg (124 lb 1.9 oz)   SpO2 92%   BMI 22.70 kg/m²     SpO2 Readings from Last 6 Encounters:   10/13/20 92%   20 98%   19 98%   19 97%   19 98%   19 97%    O2 Flow Rate (L/min): 2 l/min       Intake/Output Summary (Last 24 hours) at 10/13/2020 1003  Last data filed at 10/13/2020 0924  Gross per 24 hour   Intake 680 ml   Output 1425 ml   Net -745 ml        Physical Exam:    Gen:  Well-developed, well-nourished, in no acute distress  HEENT: Pink conjunctivae, PERRL, hearing intact to voice, moist mucous membranes  Neck:  Supple, without masses, thyroid non-tender  Resp:  No accessory muscle use, clear breath sounds without wheezes rales or rhonchi  Card:  Grade 3/6 systolic murmur, normal S1, S2 without thrills, bruits or peripheral edema  Abd:  Soft, non-tender, non-distended, normoactive bowel sounds are present, no palpable organomegaly and no detectable hernias  Lymph:  No cervical or inguinal adenopathy  Musc:  No cyanosis or clubbing  Skin:  No rashes or ulcers, skin turgor is good  Neuro:  Cranial nerves are grossly intact, no focal motor weakness, follows commands appropriatelyintubated and sedatedtubated and sedated  __________________________________________________________________  Medications Reviewed: (see below)  Medications:     Current Facility-Administered Medications   Medication Dose Route Frequency    albuterol-ipratropium (DUO-NEB) 2.5 MG-0.5 MG/3 ML  3 mL Nebulization TID RT    atorvastatin (LIPITOR) tablet 20 mg  20 mg Oral DAILY    folic acid (FOLVITE) tablet 1 mg  1 mg Oral DAILY    hydrOXYchloroQUINE (PLAQUENIL) tablet 200 mg  200 mg Oral DAILY    montelukast (SINGULAIR) tablet 10 mg  10 mg Oral DAILY    influenza vaccine 2020-21 (6 mos+)(PF) (FLUARIX/FLULAVAL/FLUZONE QUAD) injection 0.5 mL  0.5 mL IntraMUSCular PRIOR TO DISCHARGE    sodium chloride (NS) flush 5-40 mL  5-40 mL IntraVENous Q8H    sodium chloride (NS) flush 5-40 mL  5-40 mL IntraVENous PRN    acetaminophen (TYLENOL) tablet 650 mg  650 mg Oral Q4H PRN    lisinopriL (PRINIVIL, ZESTRIL) tablet 5 mg  5 mg Oral QHS    levothyroxine (SYNTHROID) tablet 125 mcg  125 mcg Oral ACB    cefdinir (OMNICEF) capsule 300 mg  300 mg Oral Q12H    pantoprazole (PROTONIX) tablet 40 mg  40 mg Oral DAILY    furosemide (LASIX) tablet 40 mg  40 mg Oral DAILY    heparin (porcine) injection 5,000 Units  5,000 Units SubCUTAneous Q12H    aspirin chewable tablet 81 mg  81 mg Oral DAILY    clopidogreL (PLAVIX) tablet 75 mg  75 mg Oral DAILY    metoprolol tartrate (LOPRESSOR) tablet 25 mg  25 mg Oral Q12H    arformoteroL (BROVANA) neb solution 15 mcg  15 mcg Nebulization BID RT    budesonide (PULMICORT) 500 mcg/2 ml nebulizer suspension  500 mcg Nebulization BID RT        Lab Data Reviewed: (see below)  Lab Review:     Recent Labs     10/12/20  0432 10/11/20  0238   WBC 8.0 11.5*   HGB 10.8* 10.8*   HCT 33.8* 34.1*   * 132*     Recent Labs     10/12/20  0432 10/11/20  0238    137   K 4.1 3.6    101   CO2 27 28    97   BUN 21* 21*   CREA 1.24* 1.18*   CA 8.7 8.3*   MG 1.8  --    PHOS 3.5  --      No results found for: GLUCPOC  No results for input(s): PH, PCO2, PO2, HCO3, FIO2 in the last 72 hours. No results for input(s): INR, INREXT, INREXT in the last 72 hours.   All Micro Results     Procedure Component Value Units Date/Time    CULTURE, BLOOD, PAIRED [[id number]] Collected:  10/08/20 1652    Order Status:  Completed Specimen:  Blood Updated:  10/13/20 0545     Special Requests: NO SPECIAL REQUESTS        Culture result: NO GROWTH 5 DAYS       CULTURE, RESPIRATORY/SPUTUM/BRONCH W GRAM STAIN [[id number]]  (Abnormal)  (Susceptibility) Collected:  10/08/20 1749    Order Status:  Completed Specimen:  Sputum,ET Suction Updated:  10/11/20 1044     Special Requests: NO SPECIAL REQUESTS        GRAM STAIN OCCASIONAL WBCS SEEN               OCCASIONAL EPITHELIAL CELLS SEEN            1+ GRAM NEGATIVE RODS               OCCASIONAL GRAM POSITIVE COCCI IN PAIRS                  OCCASIONAL GRAM NEGATIVE DIPLOCOCCI           Culture result: LIGHT ESCHERICHIA COLI               LIGHT NORMAL RESPIRATORY JAMES            FEW YEAST       CULTURE, MRSA [[id number]] Collected:  10/08/20 1611    Order Status:  Completed Specimen:  Nasal from Nares Updated:  10/09/20 2115     Special Requests: NO SPECIAL REQUESTS        Culture result: MRSA NOT PRESENT                   Screening of patient nares for MRSA is for surveillance purposes and, if positive, to facilitate isolation considerations in high risk settings. It is not intended for automatic decolonization interventions per se as regimens are not sufficiently effective to warrant routine use. RESPIRATORY PANEL,PCR,NASOPHARYNGEAL [[id number]] Collected:  10/08/20 1630    Order Status:  Canceled Specimen:  NASOPHARYNGEAL SWAB           I have reviewed notes of prior 24hr. Other pertinent lab:      Total time spent with patient: [place] discussed with: Patient, Nursing Staff and >50% of time spent in counseling and coordination of care    Discussed:  Care Plan    Prophylaxis:  Hep SQ    Disposition:  Home w/Family           ___________________________________________________    Attending Physician: Cortez Ryan MD

## 2020-10-13 NOTE — ROUTINE PROCESS
Hospital follow-up PCP transitional care appointment has been scheduled with Dr. Lucrecia Hardwick for Oct 15 @ 1:45PM to address respiratory failure. Pending patient discharge.   Ele Dempsey, Care Management Specialist.

## 2020-10-13 NOTE — DISCHARGE SUMMARY
Hospitalist Discharge Summary     Patient ID:    Chuyita Mosley  [id number]  81 y.o.  1935    Admit date of service: 10/8/2020    Discharge date of service: 10/13/2020    Admission Diagnoses: Acute on chronic respiratory failure with hypoxia (HCC) [J96.21]    Chronic Diagnoses:    Problem List as of 10/13/2020 Date Reviewed: 7/16/2019          Codes Class Noted - Resolved    Coronary artery disease of native artery of native heart with stable angina pectoris ([facility] ICD-10-CM: I25.118  ICD-9-CM: 414.01, 413.9  7/16/2019 - Present        HTN (hypertension), benign ICD-10-CM: I10  ICD-9-CM: 401.1  7/16/2019 - Present        NSTEMI (non-ST elevated myocardial infarction) ([facility] ICD-10-CM: I21.4  ICD-9-CM: 410.70  7/15/2019 - Present        Lupus ([facility] (Chronic) ICD-10-CM: M32.9  ICD-9-CM: 710.0  7/15/2019 - Present        Asthma (Chronic) ICD-10-CM: J45.909  ICD-9-CM: 493.90  7/15/2019 - Present        Hypothyroid (Chronic) ICD-10-CM: E03.9  ICD-9-CM: 244.9  7/15/2019 - Present        RESOLVED: Acute on chronic respiratory failure with hypoxia ([facility] ICD-10-CM: J96.21  ICD-9-CM: 518.84, 799.02  10/8/2020 - 10/13/2020        RESOLVED: Acute chest pain ICD-10-CM: R07.9  ICD-9-CM: 786.50  7/15/2019 - 7/16/2019        RESOLVED: Chest pain at rest ICD-10-CM: R07.9  ICD-9-CM: 786.50  7/15/2019 - 7/16/2019              Discharge Medications:   Current Discharge Medication List      START taking these medications    Details   cefdinir (OMNICEF) 300 mg capsule Take 1 Cap by mouth every twelve (12) hours. Qty: 10 Cap, Refills: 0      metoprolol tartrate (LOPRESSOR) 25 mg tablet Take 1 Tab by mouth every twelve (12) hours. Qty: 30 Tab, Refills: 0      furosemide (LASIX) 40 mg tablet Take 1 Tab by mouth daily. Qty: 30 Tab, Refills: 0         CONTINUE these medications which have NOT CHANGED    Details   Spiriva Respimat 1.25 mcg/actuation inhaler Take 2 Puffs by inhalation daily.       clopidogrel (PLAVIX) 75 mg tab Take 1 Tab by mouth daily. Qty: 30 Tab, Refills: 11      aspirin 81 mg chewable tablet Take 81 mg by mouth nightly. levothyroxine (SYNTHROID) 125 mcg tablet Take 125 mcg by mouth Daily (before breakfast). albuterol (PROVENTIL HFA, VENTOLIN HFA, PROAIR HFA) 90 mcg/actuation inhaler Take 2 Puffs by inhalation every six (6) hours as needed for Wheezing. fluticasone propion-salmeteroL (Advair Diskus) 100-50 mcg/dose diskus inhaler Take 1 Puff by inhalation every twelve (12) hours. ondansetron (ZOFRAN ODT) 8 mg disintegrating tablet Take 8 mg by mouth every eight (8) hours as needed for Nausea. aclidinium bromide (TUDORZA PRESSAIR) 400 mcg/actuation inhaler Take 1 Puff by inhalation. albuterol sulfate (PROVENTIL;VENTOLIN) 2.5 mg/0.5 mL nebu nebulizer solution by Nebulization route as needed for Wheezing. MELOXICAM PO Take 15 mg by mouth daily. acetaminophen (TYLENOL) 325 mg tablet Take  by mouth every four (4) hours as needed for Pain. bumetanide (BUMEX) 0.5 mg tablet Take 1 Tab by mouth daily. Qty: 30 Tab, Refills: 0      hydroxychloroquine (PLAQUENIL) 200 mg tablet Take 200 mg by mouth daily. montelukast (SINGULAIR) 10 mg tablet Take 10 mg by mouth daily. omeprazole (PRILOSEC) 20 mg capsule Take 20 mg by mouth daily. budesonide-formoterol (SYMBICORT) 160-4.5 mcg/actuation HFAA Take 2 Puffs by inhalation two (2) times a day. folic acid (FOLVITE) 1 mg tablet Take 1 mg by mouth daily. multivitamin (ONE A DAY) tablet Take 1 Tab by mouth daily. atorvastatin (LIPITOR) 20 mg tablet Take 20 mg by mouth daily. alendronate (FOSAMAX) 70 mg tablet Take 70 mg by mouth every seven (7) days. Calcium-Cholecalciferol, D3, (CALCIUM 600 WITH VITAMIN D3) 600 mg(1,500mg) -400 unit cap Take 1 Tab by mouth daily.  Indications: osteoporosis, a condition of weak bones         STOP taking these medications       metoprolol succinate (TOPROL-XL) 50 mg XL tablet Comments: Reason for Stopping:         lisinopril (PRINIVIL, ZESTRIL) 40 mg tablet Comments:   Reason for Stopping: Follow up Care:    1. Donya Walker MD in 1-2 weeks  2. Cardiology  Pulmonary     Diet:  Cardiac Diet    Disposition:  Home. Advanced Directive:    Discharge Exam:  See today's note. CONSULTATIONS: Cardiology and Pulmonary/Intensive care    Significant Diagnostic Studies:   Recent Labs     10/12/20  0432 10/11/20  0238   WBC 8.0 11.5*   HGB 10.8* 10.8*   HCT 33.8* 34.1*   * 132*     Recent Labs     10/12/20  0432 10/11/20  0238    137   K 4.1 3.6    101   CO2 27 28   BUN 21* 21*   CREA 1.24* 1.18*    97   CA 8.7 8.3*   MG 1.8  --    PHOS 3.5  --      No results for input(s): ALT, AP, TBIL, TBILI, TP, ALB, GLOB, GGT, AML, LPSE in the last 72 hours. No lab exists for component: SGOT, GPT, AMYP, HLPSE  No results for input(s): INR, PTP, APTT, INREXT in the last 72 hours. No results for input(s): FE, TIBC, PSAT, FERR in the last 72 hours. No results for input(s): PH, PCO2, PO2 in the last 72 hours. No results for input(s): CPK, CKMB in the last 72 hours. No lab exists for component: TROPONINI  No results found for: [place] [address]:   1. Acute respiratory failure with hypoxia and hypercapnea. Extubated on 10/09. Resolved. Off O2.      2.  Abnormal CXR. ? Pneumonia. Repeated CXR showed resolution of infiltrate. On cefdinir for 5 more days. SARS-CoV-2 is negative. Check cultures      3. Acute on chronic diastolic CHF. S/p cardiac cath and is unremarkable. Echo EF is normal, but grade one diastolic dysfunction.       4.  Elevated troponin/ recent NSTEMI/ CAD s/p PCI in July 2019. On ASA and plavix. Had cardiac cath on 10/12 and no stent required. 5.  Elevated lactic acid. Resolved. Continue ABx       6. SLE: will resume hydroxychloroquine       7. Hypothyroidism:  Continue synthroid. Check TSH     8. HTN: holding home BP meds due to hypotension. Discharged in improved condition.     Spent 35 minutes    Signed:  Jovanna Mclean MD  10/13/2020  10:17 AM

## 2020-10-13 NOTE — DISCHARGE INSTRUCTIONS
Patient Education        Avoiding Triggers With Heart  ACUTE DIAGNOSES:  Acute on chronic respiratory failure with hypoxia (HCC) [J96.21]    CHRONIC MEDICAL DIAGNOSES:  Problem List as of 10/13/2020 Date Reviewed: 7/16/2019          Codes Class Noted - Resolved    Coronary artery disease of native artery of native heart with stable angina pectoris ([facility] ICD-10-CM: I25.118  ICD-9-CM: 414.01, 413.9  7/16/2019 - Present        HTN (hypertension), benign ICD-10-CM: I10  ICD-9-CM: 401.1  7/16/2019 - Present        NSTEMI (non-ST elevated myocardial infarction) ([facility] ICD-10-CM: I21.4  ICD-9-CM: 410.70  7/15/2019 - Present        Lupus ([facility] (Chronic) ICD-10-CM: M32.9  ICD-9-CM: 710.0  7/15/2019 - Present        Asthma (Chronic) ICD-10-CM: J45.909  ICD-9-CM: 493.90  7/15/2019 - Present        Hypothyroid (Chronic) ICD-10-CM: E03.9  ICD-9-CM: 244.9  7/15/2019 - Present        RESOLVED: Acute on chronic respiratory failure with hypoxia ([facility] ICD-10-CM: J96.21  ICD-9-CM: 518.84, 799.02  10/8/2020 - 10/13/2020        RESOLVED: Acute chest pain ICD-10-CM: R07.9  ICD-9-CM: 786.50  7/15/2019 - 7/16/2019        RESOLVED: Chest pain at rest ICD-10-CM: R07.9  ICD-9-CM: 786.50  7/15/2019 - 7/16/2019              DISCHARGE MEDICATIONS:          · It is important that you take the medication exactly as they are prescribed. · Keep your medication in the bottles provided by the pharmacist and keep a list of the medication names, dosages, and times to be taken in your wallet. · Do not take other medications without consulting your doctor. DIET:  Cardiac Diet    ACTIVITY: Activity as tolerated    ADDITIONAL INFORMATION: If you experience any of the following symptoms then please call your primary care physician or return to the emergency room if you cannot get hold of your doctor: Fever, chills, nausea, vomiting, diarrhea, change in mentation, falling, bleeding, shortness of breath.     FOLLOW UP CARE:  Dr. Jacob Contreras MD you are to call and set up an appointment to see them in 5 days. Follow-up with cardiology  Follow up with pulmonary      Information obtained by :  I understand that if any problems occur once I am at home I am to contact my physician. I understand and acknowledge receipt of the instructions indicated above. Physician's or R.N.'s Signature                                                                  Date/Time                                                                                                                                              Patient or Representative Signature                                                          Date/Time     Failure: Care Instructions  Your Care Instructions     Triggers are anything that make your heart failure flare up. A flare-up is also called \"sudden heart failure\" or \"acute heart failure. \" When you have a flare-up, fluid builds up in your lungs, and you have problems breathing. You might need to go to the hospital. By watching for changes in your condition and avoiding triggers, you can prevent heart failure flare-ups. Follow-up care is a key part of your treatment and safety. Be sure to make and go to all appointments, and call your doctor if you are having problems. It's also a good idea to know your test results and keep a list of the medicines you take. How can you care for yourself at home? Watch for changes in your weight and condition  · Weigh yourself without clothing at the same time each day. Record your weight. Call your doctor if you have sudden weight gain, such as more than 2 to 3 pounds in a day or 5 pounds in a week. (Your doctor may suggest a different range of weight gain.) A sudden weight gain may mean that your heart failure is getting worse. · Keep a daily record of your symptoms.  Write down any changes in how you feel, such as new shortness of breath, cough, or problems eating. Also record if your ankles are more swollen than usual and if you feel more tired than usual. Note anything that you ate or did that could have triggered these changes. Limit sodium  Sodium causes your body to hold on to extra water. This may cause your heart failure symptoms to get worse. People get most of their sodium from processed foods. Fast food and restaurant meals also tend to be very high in sodium. · Your doctor may suggest that you limit sodium. Your doctor can tell you how much sodium is right for you. This includes limiting sodium in cooked and packaged foods. · Read food labels on cans and food packages. They tell you how much sodium you get in one serving. Check the serving size. If you eat more than one serving, you are getting more sodium. · Be aware that sodium can come in forms other than salt, including monosodium glutamate (MSG), sodium citrate, and sodium bicarbonate (baking soda). MSG is often added to Asian food. You can sometimes ask for food without MSG or salt. · Slowly reducing salt will help you adjust to the taste. Take the salt shaker off the table. · Flavor your food with garlic, lemon juice, onion, vinegar, herbs, and spices instead of salt. Do not use soy sauce, steak sauce, onion salt, garlic salt, mustard, or ketchup on your food, unless it is labeled \"low-sodium\" or \"low-salt. \"  · Make your own salad dressings, sauces, and ketchup without adding salt. · Use fresh or frozen ingredients, instead of canned ones, whenever you can. Choose low-sodium canned goods. · Eat less processed food and food from restaurants, including fast food. Exercise as directed  Moderate, regular exercise is very good for your heart. It improves your blood flow and helps control your weight. But too much exercise can stress your heart and cause a heart failure flare-up.   · Check with your doctor before you start an exercise program.  · Walking is an easy way to get exercise. Start out slowly. Gradually increase the length and pace of your walk. Swimming, riding a bike, and using a treadmill are also good forms of exercise. · When you exercise, watch for signs that your heart is working too hard. You are pushing yourself too hard if you cannot talk while you are exercising. If you become short of breath or dizzy or have chest pain, stop, sit down, and rest.  · Do not exercise when you do not feel well. Take medicines correctly  · Take your medicines exactly as prescribed. Call your doctor if you think you are having a problem with your medicine. · Make a list of all the medicines you take. Include those prescribed to you by other doctors and any over-the-counter medicines, vitamins, or supplements you take. Take this list with you when you go to any doctor. · Take your medicines at the same time every day. It may help you to post a list of all the medicines you take every day and what time of day you take them. · Make taking your medicine as simple as you can. Plan times to take your medicines when you are doing other things, such as eating a meal or getting ready for bed. This will make it easier to remember to take your medicines. · Get organized. Use helpful tools, such as daily or weekly pill containers. When should you call for help? Call 911 if you have symptoms of sudden heart failure such as:    · You have severe trouble breathing.     · You cough up pink, foamy mucus.     · You have a new irregular or rapid heartbeat. Call your doctor now or seek immediate medical care if:    · You have new or increased shortness of breath.     · You are dizzy or lightheaded, or you feel like you may faint.     · You have sudden weight gain, such as more than 2 to 3 pounds in a day or 5 pounds in a week.  (Your doctor may suggest a different range of weight gain.)     · You have increased swelling in your legs, ankles, or feet.     · You are suddenly so tired or weak that you cannot do your usual activities. Watch closely for changes in your health, and be sure to contact your doctor if you develop new symptoms. Where can you learn more? Go to http://www.gray.com/  Enter V089 in the search box to learn more about \"Avoiding Triggers With Heart Failure: Care Instructions. \"  Current as of: December 16, 2019               Content Version: 12.6  © 7013-7059 [employer]. Care instructions adapted under license by [employer] (which disclaims liability or warranty for this information). If you have questions about a medical condition or this instruction, always ask your healthcare professional. [place] [address] any warranty or liability for your use of this information. Radial Cardiac Catheterization/Angiography Discharge Instructions   It is normal to feel tired the first couple days. Take it easy and follow the physicians instructions. CHECK THE CATHETER INSERTION SITE DAILY:   Remove the wrist dressing 24 hours after the procedure. You may shower 24 hours after the procedure. Wash with soap and water and pat dry. Gentle cleaning of the site with soap and water is sufficient, cover with a dry clean dressing or bandage. Do not apply creams or powders to the area. No soaking the wrist for 3 days. Leave the puncture site open to air after 24 hours post-procedure. CALL THE PHYSICIANS:   If the site becomes red, swollen or feels warm to the touch   If there is bleeding or drainage or if there is unusual pain at the radial site. If there is any minor oozing, you may apply a band-aid and remove after 12 hours. If the bleeding continues, hold pressure with the middle finger against the puncture site and the thumb against the back of the wrist,call 911 to be transported to the hospital.   DO NOT DRIVE YOURSELF, [address]. ACTIVITY:   For the first 24 hours do not manipulate the wrist.   No lifting, pushing or pulling over 3-5 pounds with the affected wrist for 7 daysand no straining the insertion site. Do not life grocery bags or the garbage can, do not run the vacuum  or  for 7 days. Start with short walks as in the hospital and gradually increase as tolerated each day. It is recommended to walk 30 minutes 5-7 days per week. Follow your physicians instructions on activity. Avoid walking outside in extremes of heat or cold. Walk inside when it is cold and windy or hot and humid. Things to keep in mind:   No driving for at least 24 hours, or as designated by your physician. Limit the number of times you go up and down the stairs   Take rests and pace yourself with activity. Be careful and do not strain with bowel movements. MEDICATIONS:   Take all medications as prescribed   Call your physician if you have any questions   Keep an updated list of your medications with you at all times and give a list to your physician and pharmacist   SIGNS AND SYMPTOMS:   Be cautious of symptoms of angina or recurrent symptoms such as chest discomfort, unusual shortness of breath or fatigue. These could be symptoms of restenosis, a new blockage or a heart attack. If your symptoms are relieved with rest it is still recommended that you notify your physician of recurrent chest pain or discomfort. For CHEST PAIN or symptoms of angina not relieved with rest: If the discomfort is not relieved with rest, and you have been prescribed Nitroglycerin, take as directed (taken under the tongue, one at a time 5 minutes apart for a total of 3 doses). If the discomfort is not relieved after the 3rd nitroglycerin, call 911. If you have not been prescribed Nitroglycerin and your chest discomfort is not relieved with rest, call 911. AFTER CARE:   Follow up with your physician as instructed.    Follow a heart healthy diet with proper portion control, daily stress management, daily exercise, blood pressure and cholesterol control , and smoking cessation.

## 2020-10-13 NOTE — PROGRESS NOTES
11:16 AM  10/13/20      Medical records and d/c orders faxed to [facility] in [place] they will resume care . Daughter Tee Raman notified and will transport patient home. PT will evaluate prior to d/c.      Sabrina Soto RN CCM

## 2020-10-13 NOTE — PROGRESS NOTES
Problem: Mobility Impaired (Adult and Pediatric)  Goal: *Acute Goals and Plan of Care (Insert Text)  Description: FUNCTIONAL STATUS PRIOR TO ADMISSION: Patient was modified independent using a single point cane for functional mobility. HOME SUPPORT PRIOR TO ADMISSION: The patient lived alone with daughter to provide assistance. Physical Therapy Goals  Initiated 10/13/2020  1. Patient will move from supine to sit and sit to supine  in bed with modified independence within 7 day(s). 2.  Patient will transfer from bed to chair and chair to bed with modified independence using the least restrictive device within 7 day(s). 3.  Patient will perform sit to stand with modified independence within 7 day(s). 4.  Patient will ambulate with modified independence for 150 feet with the least restrictive device within 7 day(s). 5.  Patient will ascend/descend 3 stairs with 1 handrail(s) with modified independence within 7 day(s). Outcome: Progressing Towards Goal  Note:   PHYSICAL THERAPY EVALUATION  Patient: Lyric Montoya (81 y.o. female)  Date: 10/13/2020  Primary Diagnosis: Acute on chronic respiratory failure with hypoxia (HCC) [J96.21]  Procedure(s) (LRB):  LEFT HEART CATH / CORONARY ANGIOGRAPHY (N/A)  Intravascular Ultrasound - LM (N/A) 1 Day Post-Op   Precautions:   Fall      ASSESSMENT  Based on the objective data described below, the patient presents with decreased endurance and strength following admission for chest pain and found to have a NSTEMI. She required ICU stay with intubation, extubated on 10/9/2020. She has been weaned from O2 to room air. She is overall CGA for all upright mobility using a RW. She tolerated session well with vitals stable. See below. She was receiving home health prior to hospital admission and will benefit from return to home health services for cardiovascular training. .  Documentation for home O2:     ROOM AIR    AT REST   O2 SATS  97 HR  86   ROOM AIR WITH ACTIVITY 02 SATS  91 HR  80   (    )LITERS OF 02 PATIENT LEFT COMFORTABLY  SITTING/SUPINE 02 SATS  95 HR  84       Current Level of Function Impacting Discharge (mobility/balance): CGA with RW    Functional Outcome Measure: The patient scored Total: 65/100 on the Barthel Index which is indicative of 35% impaired ability to care for basic self needs/dependency on others. Other factors to consider for discharge: lives alone but daughter will stay with her first few days     Patient will benefit from skilled therapy intervention to address the above noted impairments. PLAN :  Recommendations and Planned Interventions: bed mobility training, transfer training, gait training, therapeutic exercises, and therapeutic activities      Frequency/Duration: Patient will be followed by physical therapy:  5 times a week to address goals. Recommendation for discharge: (in order for the patient to meet his/her long term goals)  Physical therapy at least 2 days/week in the home     This discharge recommendation:  A follow-up discussion with the attending provider and/or case management is planned    IF patient discharges home will need the following DME: patient owns DME required for discharge         SUBJECTIVE:   Patient stated Buster Clark feel heavy headed.     OBJECTIVE DATA SUMMARY:   HISTORY:    Past Medical History:   Diagnosis Date    Asthma     Chronic obstructive pulmonary disease ([facility]     Hypertension     Hypothyroid     Lupus ([facility]     Non Hodgkin's lymphoma ([facility]    No past surgical history on file.     Personal factors and/or comorbidities impacting plan of care: see above    Home Situation  Home Environment: Private residence  # Steps to Enter: 3  Rails to Enter: Yes  Hand Rails : Right  Wheelchair Ramp: No  One/Two Story Residence: One story  Living Alone: Yes  Support Systems: Family member(s)(daughter to stay with her at discharge)  Patient Expects to be Discharged to[de-identified] Private residence  Current DME Used/Available at Home: Cane, straight, Walker, rolling    EXAMINATION/PRESENTATION/DECISION MAKING:   Vitals    Blood pressure Heart rate(bpm) Oxygen saturation (room air)  Pre-activity    86   97%  During activity  Post activity 113/58   84   95%    Critical Behavior:  Neurologic State: Alert, Appropriate for age, Eyes open spontaneously  Orientation Level: Oriented X4  Cognition: Appropriate decision making, Appropriate for age attention/concentration, Appropriate safety awareness, Follows commands     Hearing: Auditory  Auditory Impairment: None    Range Of Motion:  AROM: Within functional limits           PROM: Within functional limits           Strength:    Strength: Generally decreased, functional                    Tone & Sensation:                                  Coordination:     Vision:      Functional Mobility:  Bed Mobility:  Rolling: Contact guard assistance  Supine to Sit: Minimum assistance  Sit to Supine: Contact guard assistance     Transfers:  Sit to Stand: Contact guard assistance  Stand to Sit: Contact guard assistance        Bed to Chair: Contact guard assistance              Balance:      Ambulation/Gait Training:  Distance (ft): 100 Feet (ft)  Assistive Device: Walker, rolling;Gait belt  Ambulation - Level of Assistance: Contact guard assistance     Gait Description (WDL): Exceptions to WDL                   Functional Measure:  Barthel Index:    Bathin  Bladder: 5  Bowels: 10  Groomin  Dressin  Feeding: 10  Mobility: 5  Stairs: 5  Toilet Use: 10  Transfer (Bed to Chair and Back): 10  Total: 65/100       The Barthel ADL Index: Guidelines  1. The index should be used as a record of what a patient does, not as a record of what a patient could do. 2. The main aim is to establish degree of independence from any help, physical or verbal, however minor and for whatever reason. 3. The need for supervision renders the patient not independent.   4. A patient's performance should be established using the best available evidence. Asking the patient, friends/relatives and nurses are the usual sources, but direct observation and common sense are also important. However direct testing is not needed. 5. Usually the patient's performance over the preceding 24-48 hours is important, but occasionally longer periods will be relevant. 6. Middle categories imply that the patient supplies over 50 per cent of the effort. 7. Use of aids to be independent is allowed. [facility] Barthel, D.W. ([id number]). Functional evaluation: the Barthel Index. [address] [facility] (14)2. Michael Phillips harry FATEMEH Hurtado, Latrell Fallon., Melany Ravi., [place], [address] (1999). Measuring the change indisability after inpatient rehabilitation; comparison of the responsiveness of the Barthel Index and Functional [place] Measure. Journal of Neurology, Neurosurgery, and Psychiatry, 66(4), 107-583. Nai Daley, NCiraJ.A, BJ Nichole, & Lizeth Munguia M.A. (2004.) Assessment of post-stroke quality of life in cost-effectiveness studies: The usefulness of the Barthel Index and the EuroQoL-5D. Quality of Life Research, 15, 801-20        Physical Therapy Evaluation Charge Determination   History Examination Presentation Decision-Making   HIGH Complexity :3+ comorbidities / personal factors will impact the outcome/ POC  MEDIUM Complexity : 3 Standardized tests and measures addressing body structure, function, activity limitation and / or participation in recreation  MEDIUM Complexity : Evolving with changing characteristics  Other outcome measures barthel index  MEDIUM      Based on the above components, the patient evaluation is determined to be of the following complexity level: MEDIUM    Pain Rating:  None reported    Activity Tolerance:   Fair and requires rest breaks  Please refer to the flowsheet for vital signs taken during this treatment.     After treatment patient left in no apparent distress:   Supine in bed, Call bell within reach, Bed / chair alarm activated, Caregiver / family present, and Side rails x 3    COMMUNICATION/EDUCATION:   The patients plan of care was discussed with: Registered nurse. Fall prevention education was provided and the patient/caregiver indicated understanding., Patient/family have participated as able in goal setting and plan of care. , and Patient/family agree to work toward stated goals and plan of care.     Thank you for this referral.  Lexa Eli, PT, DPT   Time Calculation: 21 mins

## 2020-10-13 NOTE — PROGRESS NOTES
Home Care Face to Face Encounter    Patients Name: Laith Stevens    YOB: 1935    Primary Diagnosis: acute respiratory failure     Date of Face to Face:   10/13/20                                  Face to Face Encounter findings are related to primary reason for home care:   yes. 1. I certify that the patient needs intermittent care as follows: skilled nursing care:  skilled observation/assessment, patient education, complex care plan management and administration of medications  physical therapy: strengthening, stretching/ROM, transfer training and gait/stair training  occupational therapy:  ADL safety (ie. cooking, bathing, dressing) and ROM    2. I certify that this patient is homebound, that is: 1) patient requires the use of a walker device, special transportation, or assistance of another to leave the home; or 2) patient's condition makes leaving the home medically contraindicated; and 3) patient has a normal inability to leave the home and leaving the home requires considerable and taxing effort. Patient may leave the home for infrequent and short duration for medical reasons, and occasional absences for non-medical reasons. Homebound status is due to the following functional limitations: Patient's ambulation limited secondary to severe pain and requires the use of an assistive device and the assistance of a caregiver for safe completion. Patient with strength and ROM deficits limiting ambulation endurance requiring the use of an assistive device and the assistance of a caregiver. Patient deemed temporarily homebound secondary to increased risk for infection when leaving home and going out into the community. 3. I certify that this patient is under my care and that I, or a nurse practitioner or  [id number], or clinical nurse specialist, or certified nurse midwife, working with me, had a Face-to-Face Encounter that meets the physician Face-to-Face Encounter requirements. The following are the clinical findings from the [address] [place] [address] encounter that support the need for skilled services and is a summary of the encounter:      See discharge summary      Herlinda Willams MD  10/13/2020

## 2020-10-13 NOTE — PROGRESS NOTES
10/13/20 0820   Chart and Patient  Assessment   Pulmonary History 3   Surgical History 0   Chest X-ray 2   Respiratory Pattern 0   Mental Status 0   Breath Sounds 2   Cough 0   Level of Activity/Mobility 1   Respiratory Assessment Total Score 8

## 2020-10-13 NOTE — PROGRESS NOTES
Bedside\"} shift change report given to [facility] (oncoming nurse) by Stepan Reeder (offgoing nurse). Report included the following information SBAR, Kardex, ED Summary, Intake/Output, MAR, Recent Results and Cardiac Rhythm NSR.     1415 Pt stated that she had her flu shot on the 10. 1.2020.

## 2020-10-16 ENCOUNTER — TELEPHONE (OUTPATIENT)
Dept: CARDIOLOGY CLINIC | Age: 85
End: 2020-10-16

## 2020-10-16 NOTE — TELEPHONE ENCOUNTER
April a home health nurse would like to talk to kush regarding some weight gain that she believes is due to fluid retention. Please advise.     Phone: [phone]

## 2020-10-16 NOTE — TELEPHONE ENCOUNTER
Called April Sunil Ferreira nurse no answer, LM/rupinder to call office. I called and spoke with patient since discharge from the hospital on 10/13/20 patient has been taking her weight daily. Wednesday 10/14  123 lbs  Thursday 10/15 125.8 lbs  Friday - today 10/16 127 lbs    Patient is taking medications as prescribed  Metoprolol 25 mg BID  Lasix 40 mg daily  Lisinopril 5 mg nightly    Patient is scheduled for a follow up visit on 10/22/20    Please advise.

## 2020-10-17 NOTE — PROGRESS NOTES
Physician Progress Note      PATIENT:               Neelima Fuentes  CSN #:                  [id number]  :                       1935  ADMIT DATE:       10/8/2020 3:14 PM  Oscar Eubanks DATE:        10/13/2020 3:58 PM  RESPONDING  PROVIDER #:        Ben Almodovar MD          QUERY TEXT:    Dr. Lisa Lara    This patient admitted on 10/08/2020 and was discharged on 10/13/2020. The Diagnosis of Sepsis with Septic Shock is noted and documented in the H&P, however, this diagnosis was dropped from the remaining of the progress notes , and is not noted in the discharge summary. If possible, can you please clarify and  please document  discharge summary if Sepsis with septic shock: The medical record reflects the following:  Risk Factors: 81 yr old female SOB, Transferred for OSH Ignacio. Pneumonia, intubated upon arrival.  Clinical Indicators: On day of transfer, WBC @ 21.4, Lactic acid up to 4.0-intubated at time of transfer, MAPs in the 50s--CXR + Ignacio. Pneumonia. Treatment: IV Broad spectrum abx, COVID screnn blood cx, On pressors, Monitor of Lactic Acid    Thank you,  [place]res [place], [address]  Options provided:  -- Yes, Sepsis with Septic Shock Confirmed after study  -- Sepsis with Septic Shock, after carful study was Ruled-Localized infection only without Sepsis  -- Other - I will add my own diagnosis  -- Disagree - Not applicable / Not valid  -- Disagree - Clinically unable to determine / Unknown  -- Refer to Clinical Documentation Reviewer    PROVIDER RESPONSE TEXT:    No sepsis    Query created by: Aspen Durant on 10/16/2020 8:57 AM      QUERY TEXT:    Dr. Lisa Lara  This patient was admitted on 10/08/2020-10/13/2020. Transferred from Outside hospital.    The patient noted with dx. of Ignacio. Pneumonia. Patient has a history of COPD and CHF.       After study, Can you please document in the progress notes and discharge summary the type of Pneumonia  you were monitoring and  evaluating and/or treating any of the following: The medical record reflects the following:  Risk Factors: COPD, Arrived from outside hospital intubated on vent. Clinical Indicators: CXR + helene. airspace disease concerning for Pneumonia  Treatment: Admitted to ICU on Full vent support. Extubated on 10/09/2020- Braod spectrum abx, blood, sputum  They were treated with Vanc, Maxipime and Levaquin before transitioning to Cefdinir. Resp. cx + Gram negative  rods. cx, COVID ruled out    Thank you,  DANIEL Clemons ,[place], [place], [address] [facility]  Options provided:  -- Gram negative pneumonia  -- Bacterial pneumonia  -- Viral pneumonia  -- Aspiration pneumonia  -- Other - I will add my own diagnosis  -- Disagree - Not applicable / Not valid  -- Disagree - Clinically unable to determine / Unknown  -- Refer to Clinical Documentation Reviewer    PROVIDER RESPONSE TEXT:    Pt had no pneumonia or sepsis    Query created by: Aspen Durant on 10/16/2020 9:10 AM      Electronically signed by:  Ben Almodovar MD 10/17/2020 6:15 AM

## 2020-10-22 ENCOUNTER — OFFICE VISIT (OUTPATIENT)
Dept: CARDIOLOGY CLINIC | Age: 85
End: 2020-10-22
Payer: MEDICARE

## 2020-10-22 VITALS
DIASTOLIC BLOOD PRESSURE: 62 MMHG | HEIGHT: 62 IN | BODY MASS INDEX: 23.55 KG/M2 | SYSTOLIC BLOOD PRESSURE: 110 MMHG | OXYGEN SATURATION: 97 % | HEART RATE: 60 BPM | WEIGHT: 128 LBS | RESPIRATION RATE: 20 BRPM

## 2020-10-22 DIAGNOSIS — I10 ESSENTIAL HYPERTENSION: ICD-10-CM

## 2020-10-22 DIAGNOSIS — I50.32 CHRONIC DIASTOLIC HEART FAILURE (HCC): Primary | ICD-10-CM

## 2020-10-22 DIAGNOSIS — I25.118 CORONARY ARTERY DISEASE OF NATIVE ARTERY OF NATIVE HEART WITH STABLE ANGINA PECTORIS (HCC): ICD-10-CM

## 2020-10-22 DIAGNOSIS — J44.9 CHRONIC OBSTRUCTIVE PULMONARY DISEASE, UNSPECIFIED COPD TYPE (HCC): ICD-10-CM

## 2020-10-22 PROCEDURE — G8400 PT W/DXA NO RESULTS DOC: HCPCS | Performed by: INTERNAL MEDICINE

## 2020-10-22 PROCEDURE — 1111F DSCHRG MED/CURRENT MED MERGE: CPT | Performed by: INTERNAL MEDICINE

## 2020-10-22 PROCEDURE — G8432 DEP SCR NOT DOC, RNG: HCPCS | Performed by: INTERNAL MEDICINE

## 2020-10-22 PROCEDURE — G8536 NO DOC ELDER MAL SCRN: HCPCS | Performed by: INTERNAL MEDICINE

## 2020-10-22 PROCEDURE — G8754 DIAS BP LESS 90: HCPCS | Performed by: INTERNAL MEDICINE

## 2020-10-22 PROCEDURE — G0463 HOSPITAL OUTPT CLINIC VISIT: HCPCS | Performed by: INTERNAL MEDICINE

## 2020-10-22 PROCEDURE — 1101F PT FALLS ASSESS-DOCD LE1/YR: CPT | Performed by: INTERNAL MEDICINE

## 2020-10-22 PROCEDURE — 99214 OFFICE O/P EST MOD 30 MIN: CPT | Performed by: INTERNAL MEDICINE

## 2020-10-22 PROCEDURE — G8427 DOCREV CUR MEDS BY ELIG CLIN: HCPCS | Performed by: INTERNAL MEDICINE

## 2020-10-22 PROCEDURE — 1090F PRES/ABSN URINE INCON ASSESS: CPT | Performed by: INTERNAL MEDICINE

## 2020-10-22 PROCEDURE — G8752 SYS BP LESS 140: HCPCS | Performed by: INTERNAL MEDICINE

## 2020-10-22 PROCEDURE — G8420 CALC BMI NORM PARAMETERS: HCPCS | Performed by: INTERNAL MEDICINE

## 2020-10-22 RX ORDER — FLUTICASONE FUROATE, UMECLIDINIUM BROMIDE AND VILANTEROL TRIFENATATE 100; 62.5; 25 UG/1; UG/1; UG/1
POWDER RESPIRATORY (INHALATION)
COMMUNITY
Start: 2020-10-19

## 2020-10-22 RX ORDER — ZOSTER VACCINE RECOMBINANT, ADJUVANTED 50 MCG/0.5
KIT INTRAMUSCULAR
Status: ON HOLD | COMMUNITY
Start: 2019-05-08 | End: 2021-08-20

## 2020-10-22 NOTE — LETTER
10/22/20 Patient: Margarito Hewitt YOB: 1935 Date of Visit: 10/22/2020 Nilam Thurston MD 
[id number] [address] [address] VIA Facsimile: [phone] Dear Nilam Thurston MD, Thank you for referring Ms. Margarito Hewitt to [facility] for evaluation. My notes for this consultation are attached. If you have questions, please do not hesitate to call me. I look forward to following your patient along with you. Sincerely, Onesimo Keller MD

## 2020-10-22 NOTE — PROGRESS NOTES
Arlene Lobo MD    [address] [facility] [address] [facility]    Office [phone],LRW [phone]      Laith Stevens is a 81 y.o. female is here for f/u visit . Primary care physician:  Quynh Leonard MD      Dear Khushboo Tomlinson,    I had the pleasure of seeing Ms. Laith Stevens in the office today. Assessment:    Chronic diastolic heart failure (HFpEF)  CAD - NSTEMI(7/15/19 )- PCI to RCA with VINOD  HTN  HLD  Hx of Hypothyroid/SLE/Non Hodgkin's Lymphoma in remission ( last treatment 2013)  History of lupus/rheumatoid arthritis  History of CLARISSE  CKD   Anemia -  COPD on inhalers    Plan:   Continue current dose of Lasix can take extra half a tablet of Lasix for weight gain. Parameters given. Continue DAPT- aspirin/Plavix - . If there is concern for bleeding/anemia-can stop Plavix. Will address next visit. Blood pressure elevated here. However, patient states that usually is well controlled. Continue to monitor. Aggressive cardiovascular risk factor modification. Follow-up in 6 weeks or earlier as needed. Patient understands the plan. All questions were answered to the patient's satisfaction. Medication Side Effects and Warnings were discussed with patient: yes  Patient Labs were reviewed and or requested:  yes  Patient Past Records were reviewed and or requested: yes    I appreciate the opportunity to be involved in [address] [place] [address]. See note below for details. Please do not hesitate to contact us with questions or concerns. Arlene Lobo MD    Cardiac Testing/ Procedures: A. Cardiac Cath/PCI: 10/2/20  Access: right radial artery, 5F. Unable to engage left coronary from right radial.  Right CFA, 5F  Catheters:  Left coronary: JL 3.5, 5F (tried jl 3.5, al 1, tig 3, tig 4, BU 3.5, and Art.   runthrough wire was used to stabilize catheter during angiography  Right coronary: JR4, 5F     Findings:   L Main: large caliber, ostial calcified stenosis with MLA 8.2mm2 with eccentric calcium throughout left main that is non-occlusive  LAD: large caliber vessel, small d1 and moderate d2, no critical disease  LCx: large caliber, large om1, small om2 and small om3, no critical disease  RCA: moderate caliber, previously placed proximal RCA stent patent, small PDA and small RP branch     LVEDP:  12  mmhg           Specimens Removed: None     Implants: n/a     Closure Device: radial TR band     See full cath note.     Complications: none        Findings:  1. Multivessel cad with left main ostial stenosis with MLA 8.2 mm2 and patent RCA stent  2. Borderline elevation in lvedp     Plan:     Cont medical mgmt    July 2020    R Radial access  RCA - JR4  LCA - difficulty to access with TIG4;EBU 3.5     R CFA - micropuncture/ultrasound/fluorscopy guided access     L Main: Ca++; Ostial LM 10% ( good reflux/no dampening)     LAD: Med; MLI; D1 - MLI     LCflex: Med; MLI     RCA: Dominant  Prox 95%; Mid 50%; PDA and PLB - small; PLB branch diffuse severe dz        LVEDP:  15     LVEF: Not assessed; Nml by echo     No significant gradient across aortic valve.     PCI: JR4 guide ( with SH)  BMW wire  Pre dil with 2 by 12  VINOD 2.5 by 26  Post dil with 2.75 by 12 ( at high merary - approx 3mm)  GERALD 3 before and after           Specimens Removed : None     Closure Device: TR Band - R radial     R CFA - mynx          B. ECHO/AUNDREA: 7//15/19 - EF 56-60%; Mild AR, TR, AK, MR. Mod Pulm HTN    C.StressNuclear/Stress ECHO/Stress test:    D.Vascular:    E. EP:    F. Miscellaneous:    Subjective:  Chinmay Serrano is a 81 y.o. female who returns for follow up  today . No CP. Compliant with meds. Recently had weight gain/swelling lower extremities. Lasix dose was increased for 4 days was decreased retrosternal dose of 40 mg daily. Patient states that with increased dose of Lasix her swelling resolved.   Also has been diagnosed with COPD (no history of smoking/remote history of secondhand smoking/worked in [place]). Her inhalers have been changed and since then her dyspnea has improved. States that she has lost some weight but is trying to eat better. Her daughter-Cesia is a patient here/former nurse at [employer] [facility]    ROS:  (bold if positive, if negative)    SOB/SOUSA         Medications before admission:    Current Outpatient Medications   Medication Sig Dispense    fluticasone-umeclidinium-vilanterol (Trelegy Ellipta) 100-62.5-25 mcg inhaler      varicella-zoster recombinant, PF, (Shingrix, PF,) 50 mcg/0.5 mL susr injection as directed     metoprolol tartrate (LOPRESSOR) 25 mg tablet Take 1 Tab by mouth every twelve (12) hours. 30 Tab    furosemide (LASIX) 40 mg tablet Take 1 Tab by mouth daily. 30 Tab    lisinopriL (PRINIVIL, ZESTRIL) 5 mg tablet Take 1 Tab by mouth nightly. 30 Tab    ondansetron (ZOFRAN ODT) 8 mg disintegrating tablet Take 8 mg by mouth every eight (8) hours as needed for Nausea.  aclidinium bromide (TUDORZA PRESSAIR) 400 mcg/actuation inhaler Take 1 Puff by inhalation.  albuterol sulfate (PROVENTIL;VENTOLIN) 2.5 mg/0.5 mL nebu nebulizer solution by Nebulization route as needed for Wheezing.  acetaminophen (TYLENOL) 325 mg tablet Take  by mouth every four (4) hours as needed for Pain.  clopidogrel (PLAVIX) 75 mg tab Take 1 Tab by mouth daily. 30 Tab    hydroxychloroquine (PLAQUENIL) 200 mg tablet Take 200 mg by mouth daily.  montelukast (SINGULAIR) 10 mg tablet Take 10 mg by mouth daily.  omeprazole (PRILOSEC) 20 mg capsule Take 20 mg by mouth daily.  folic acid (FOLVITE) 1 mg tablet Take 1 mg by mouth daily.  multivitamin (ONE A DAY) tablet Take 1 Tab by mouth daily.  aspirin 81 mg chewable tablet Take 81 mg by mouth nightly.  atorvastatin (LIPITOR) 20 mg tablet Take 20 mg by mouth daily.  levothyroxine (SYNTHROID) 125 mcg tablet Take 125 mcg by mouth Daily (before breakfast).      albuterol (PROVENTIL HFA, VENTOLIN HFA, PROAIR HFA) 90 mcg/actuation inhaler Take 2 Puffs by inhalation every six (6) hours as needed for Wheezing.  alendronate (FOSAMAX) 70 mg tablet Take 70 mg by mouth every seven (7) days.  Calcium-Cholecalciferol, D3, (CALCIUM 600 WITH VITAMIN D3) 600 mg(1,500mg) -400 unit cap Take 1 Tab by mouth daily. Indications: osteoporosis, a condition of weak bones     cefdinir (OMNICEF) 300 mg capsule Take 1 Cap by mouth every twelve (12) hours. 10 Cap    Spiriva Respimat 1.25 mcg/actuation inhaler Take 2 Puffs by inhalation daily.  fluticasone propion-salmeteroL (Advair Diskus) 100-50 mcg/dose diskus inhaler Take 1 Puff by inhalation every twelve (12) hours.  MELOXICAM PO Take 15 mg by mouth daily.  budesonide-formoterol (SYMBICORT) 160-4.5 mcg/actuation HFAA Take 2 Puffs by inhalation two (2) times a day. No current facility-administered medications for this visit. Family History of CAD:    No    Social History:  Current  Smoker  Yes    Physical Exam:  Visit Vitals  /62 (BP 1 Location: Left arm, BP Patient Position: Sitting)   Pulse 60   Resp 20   Ht 5' 2\" (1.575 m)   Wt 128 lb (58.1 kg)   SpO2 97%   BMI 23.41 kg/m²          Gen: Well-developed, well-nourished, in no acute distress  Neck: Supple,No JVD, No Carotid Bruit,   Resp: No accessory muscle use, Clear breath sounds, No rales or rhonchi  Card: Regular Rate,Rythm,Normal S1, S2, 2/6 sys murmur+,No rubs or gallop. No thrills.    Abd:  Soft, BS+,   MSK: No cyanosis  Skin: No rashes    Neuro: moving all four extremities , follows commands appropriately  Psych:  Good insight, oriented to person, place , alert, Nml Affect  LE: No edema    EKG:       LABS:        Lab Results   Component Value Date/Time    WBC 8.0 10/12/2020 04:32 AM    HGB 10.8 (L) 10/12/2020 04:32 AM    HCT 33.8 (L) 10/12/2020 04:32 AM    PLATELET 151 (L) 83/66/6070 04:32 AM     Lab Results   Component Value Date/Time    Sodium 136 10/12/2020 04:32 AM    Potassium 4.1 10/12/2020 04:32 AM    Chloride 101 10/12/2020 04:32 AM    CO2 27 10/12/2020 04:32 AM    Anion gap 8 10/12/2020 04:32 AM    Glucose 100 10/12/2020 04:32 AM    BUN 21 (H) 10/12/2020 04:32 AM    Creatinine 1.24 (H) 10/12/2020 04:32 AM    BUN/Creatinine ratio 17 10/12/2020 04:32 AM    GFR est AA 50 (L) 10/12/2020 04:32 AM    GFR est non-AA 41 (L) 10/12/2020 04:32 AM    Calcium 8.7 10/12/2020 04:32 AM       Lab Results   Component Value Date/Time    aPTT 93.1 (HH) 07/15/2019 08:07 AM     No results found for: INR, PTMR, PTP, PT1, PT2, INREXT, INREXT  No components found for: Marcus Mallory MD

## 2020-10-22 NOTE — PROGRESS NOTES
.  Visit Vitals  /62 (BP 1 Location: Left arm, BP Patient Position: Sitting)   Pulse 60   Resp 20   Ht 5' 2\" (1.575 m)   Wt 128 lb (58.1 kg)   SpO2 97%   BMI 23.41 kg/m²

## 2020-10-23 ENCOUNTER — TELEPHONE (OUTPATIENT)
Dept: CARDIOLOGY CLINIC | Age: 85
End: 2020-10-23

## 2020-10-23 NOTE — TELEPHONE ENCOUNTER
What parameters do you want pt to have for BP before using Metoprolol? She took BP prior to Metoprolol 121/66 then after med it was 98/50.

## 2020-10-23 NOTE — TELEPHONE ENCOUNTER
Deirdre with [facility] is calling to speak with a nurse to discuss the parameters of metoprolol. She stated that the patient takes her medication even when her blood pressure is in normal range and it is causing it to drop. Please advise.     Phone #: [phone]  Thanks

## 2020-10-29 NOTE — TELEPHONE ENCOUNTER
Patient Call     Dmitri Martinez MD  You 6 days ago       Hold Metoprolol if SBP <110 ( before taking med)    Message text

## 2020-12-03 ENCOUNTER — HOSPITAL ENCOUNTER (EMERGENCY)
Age: 85
Discharge: HOME | End: 2020-12-03
Attending: STUDENT IN AN ORGANIZED HEALTH CARE EDUCATION/TRAINING PROGRAM
Payer: MEDICARE

## 2020-12-03 ENCOUNTER — APPOINTMENT (OUTPATIENT)
Dept: GENERAL RADIOLOGY | Age: 85
End: 2020-12-03
Attending: NURSE PRACTITIONER
Payer: MEDICARE

## 2020-12-03 VITALS
HEART RATE: 83 BPM | WEIGHT: 128.31 LBS | OXYGEN SATURATION: 99 % | RESPIRATION RATE: 18 BRPM | DIASTOLIC BLOOD PRESSURE: 58 MMHG | TEMPERATURE: 98.7 F | SYSTOLIC BLOOD PRESSURE: 119 MMHG | BODY MASS INDEX: 23.61 KG/M2 | HEIGHT: 62 IN

## 2020-12-03 DIAGNOSIS — D72.829 LEUKOCYTOSIS, UNSPECIFIED TYPE: Primary | ICD-10-CM

## 2020-12-03 DIAGNOSIS — R79.89 ELEVATED SERUM CREATININE: ICD-10-CM

## 2020-12-03 LAB
ALBUMIN SERPL-MCNC: 4.1 G/DL (ref 3.5–5)
ALBUMIN/GLOB SERPL: 1.2 {RATIO} (ref 1.1–2.2)
ALP SERPL-CCNC: 75 U/L (ref 45–117)
ALT SERPL-CCNC: 18 U/L (ref 12–78)
ANION GAP SERPL CALC-SCNC: 9 MMOL/L (ref 5–15)
AST SERPL-CCNC: 18 U/L (ref 15–37)
BASOPHILS # BLD: 0 K/UL (ref 0–0.1)
BASOPHILS NFR BLD: 0 % (ref 0–1)
BILIRUB SERPL-MCNC: 0.8 MG/DL (ref 0.2–1)
BUN SERPL-MCNC: 25 MG/DL (ref 6–20)
BUN/CREAT SERPL: 16 (ref 12–20)
CALCIUM SERPL-MCNC: 9.1 MG/DL (ref 8.5–10.1)
CHLORIDE SERPL-SCNC: 95 MMOL/L (ref 97–108)
CO2 SERPL-SCNC: 28 MMOL/L (ref 21–32)
COMMENT, HOLDF: NORMAL
CREAT SERPL-MCNC: 1.54 MG/DL (ref 0.55–1.02)
DIFFERENTIAL METHOD BLD: ABNORMAL
EOSINOPHIL # BLD: 0 K/UL (ref 0–0.4)
EOSINOPHIL NFR BLD: 0 % (ref 0–7)
ERYTHROCYTE [DISTWIDTH] IN BLOOD BY AUTOMATED COUNT: 15.8 % (ref 11.5–14.5)
GLOBULIN SER CALC-MCNC: 3.5 G/DL (ref 2–4)
GLUCOSE SERPL-MCNC: 115 MG/DL (ref 65–100)
HCT VFR BLD AUTO: 37.8 % (ref 35–47)
HGB BLD-MCNC: 12.2 G/DL (ref 11.5–16)
IMM GRANULOCYTES # BLD AUTO: 0 K/UL (ref 0–0.04)
IMM GRANULOCYTES NFR BLD AUTO: 0 % (ref 0–0.5)
LACTATE SERPL-SCNC: 1.5 MMOL/L (ref 0.4–2)
LYMPHOCYTES # BLD: 1.7 K/UL (ref 0.8–3.5)
LYMPHOCYTES NFR BLD: 7 % (ref 12–49)
MAGNESIUM SERPL-MCNC: 2 MG/DL (ref 1.6–2.4)
MCH RBC QN AUTO: 26.4 PG (ref 26–34)
MCHC RBC AUTO-ENTMCNC: 32.3 G/DL (ref 30–36.5)
MCV RBC AUTO: 81.8 FL (ref 80–99)
MONOCYTES # BLD: 1.7 K/UL (ref 0–1)
MONOCYTES NFR BLD: 7 % (ref 5–13)
NEUTS BAND NFR BLD MANUAL: 7 %
NEUTS SEG # BLD: 20.5 K/UL (ref 1.8–8)
NEUTS SEG NFR BLD: 79 % (ref 32–75)
NRBC # BLD: 0 K/UL (ref 0–0.01)
NRBC BLD-RTO: 0 PER 100 WBC
PLATELET # BLD AUTO: 170 K/UL (ref 150–400)
PLATELET COMMENTS,PCOM: ABNORMAL
PMV BLD AUTO: 11.8 FL (ref 8.9–12.9)
POTASSIUM SERPL-SCNC: 4.1 MMOL/L (ref 3.5–5.1)
PROT SERPL-MCNC: 7.6 G/DL (ref 6.4–8.2)
RBC # BLD AUTO: 4.62 M/UL (ref 3.8–5.2)
RBC MORPH BLD: ABNORMAL
SAMPLES BEING HELD,HOLD: NORMAL
SODIUM SERPL-SCNC: 132 MMOL/L (ref 136–145)
TROPONIN I SERPL-MCNC: 0.07 NG/ML
WBC # BLD AUTO: 23.9 K/UL (ref 3.6–11)
WBC MORPH BLD: ABNORMAL

## 2020-12-03 PROCEDURE — 71045 X-RAY EXAM CHEST 1 VIEW: CPT

## 2020-12-03 PROCEDURE — 87635 SARS-COV-2 COVID-19 AMP PRB: CPT

## 2020-12-03 PROCEDURE — 84484 ASSAY OF TROPONIN QUANT: CPT

## 2020-12-03 PROCEDURE — 80053 COMPREHEN METABOLIC PANEL: CPT

## 2020-12-03 PROCEDURE — 83735 ASSAY OF MAGNESIUM: CPT

## 2020-12-03 PROCEDURE — 83605 ASSAY OF LACTIC ACID: CPT

## 2020-12-03 PROCEDURE — 99284 EMERGENCY DEPT VISIT MOD MDM: CPT

## 2020-12-03 PROCEDURE — 85025 COMPLETE CBC W/AUTO DIFF WBC: CPT

## 2020-12-03 PROCEDURE — 36415 COLL VENOUS BLD VENIPUNCTURE: CPT

## 2020-12-03 PROCEDURE — 93005 ELECTROCARDIOGRAM TRACING: CPT

## 2020-12-03 RX ORDER — FUROSEMIDE 40 MG/1
40 TABLET ORAL DAILY
Qty: 5 TAB | Refills: 0 | Status: SHIPPED | OUTPATIENT
Start: 2020-12-03 | End: 2020-12-08

## 2020-12-03 NOTE — ED NOTES
5:37 PM  I have evaluated the patient as the Provider in Triage. I have reviewed Her vital signs and the triage nurse assessment. I have talked with the patient and any available family and advised that I am the provider in triage and have ordered the appropriate study to initiate their work up based on the clinical presentation during my assessment. I have advised that the patient will be accommodated in the Main ED as soon as possible. I have also requested to contact the triage nurse or myself immediately if the patient experiences any changes in their condition during this brief waiting period. Elisabet Chandra, ROSI    62-year-old female with reported history of COPD, CAD, and heart failure presents today for evaluation of fatigue, chills, vomiting x2 earlier this morning, and easy fatigability/mild shortness of breath.   No chest pain or diaphoresis

## 2020-12-03 NOTE — ED TRIAGE NOTES
Patient reports x2 episodes of vomiting this morning. Denies nausea at this time. Advised by PCP to come to ER for covid test prior to being seen by PCP. Denies any positive contacts. Reports having chills but denies any fevers or increasing in shortness of breath. History of asthma and COPD. Also endorses bilateral rib pain.

## 2020-12-03 NOTE — ED PROVIDER NOTES
80yo F with hx of lupus, nonHodgkins Lymphoma, COPD, hypothyroidism, NSTEMI, CAD, HTN presenting with vomiting. Had two episodes of vomiting and tried to make an appt with PCP who told her to get a COVID test prior to scheduling an appt so she is in the ED for the COVID test. Endorses chills, SOB which is baseline for her 2/2 COPD. She also feels more fatigued lately. Denies fevers, HA, dizziness, nausea, diarrhea. Past Medical History:   Diagnosis Date    Asthma     Chronic obstructive pulmonary disease ([facility]     Hypertension     Hypothyroid     Lupus ([facility]     Non Hodgkin's lymphoma ([facility]        No past surgical history on file.       Family History:   Problem Relation Age of Onset    Heart Disease Mother        Social History     Socioeconomic History    Marital status:      Spouse name: Not on file    Number of children: Not on file    Years of education: Not on file    Highest education level: Not on file   Occupational History    Not on file   Social Needs    Financial resource strain: Not on file    Food insecurity     Worry: Not on file     Inability: Not on file    Transportation needs     Medical: Not on file     Non-medical: Not on file   Tobacco Use    Smoking status: Never Smoker    Smokeless tobacco: Never Used   Substance and Sexual Activity    Alcohol use: Never     Frequency: Never    Drug use: Never    Sexual activity: Not on file   Lifestyle    Physical activity     Days per week: Not on file     Minutes per session: Not on file    Stress: Not on file   Relationships    Social connections     Talks on phone: Not on file     Gets together: Not on file     Attends Christianity service: Not on file     Active member of club or organization: Not on file     Attends meetings of clubs or organizations: Not on file     Relationship status: Not on file    Intimate partner violence     Fear of current or ex partner: Not on file     Emotionally abused: Not on file Physically abused: Not on file     Forced sexual activity: Not on file   Other Topics Concern    Not on file   Social History Narrative    Not on file         ALLERGIES: Latex    Review of Systems   Constitutional: Positive for chills and fatigue. Negative for activity change, appetite change and fever. HENT: Negative for congestion, postnasal drip, rhinorrhea, sinus pain and sore throat. Respiratory: Negative for cough, chest tightness, shortness of breath and wheezing. Cardiovascular: Negative for chest pain and leg swelling. Gastrointestinal: Positive for vomiting. Negative for abdominal distention, abdominal pain, constipation, diarrhea and nausea. Endocrine: Negative for cold intolerance and heat intolerance. Genitourinary: Negative for dysuria and urgency. Musculoskeletal: Negative for back pain, gait problem, joint swelling and myalgias. Skin: Negative for color change and pallor. Neurological: Negative for dizziness, light-headedness and headaches. Vitals:    12/03/20 1721   BP: (!) 120/52   Pulse: 83   Resp: 18   Temp: 98.7 °F (37.1 °C)   SpO2: 98%   Weight: 58.2 kg (128 lb 4.9 oz)   Height: 5' 2\" (1.575 m)            Physical Exam  Constitutional:       General: She is not in acute distress. Appearance: Normal appearance. She is normal weight. She is not ill-appearing or toxic-appearing. HENT:      Head: Normocephalic and atraumatic. Right Ear: Tympanic membrane normal.      Left Ear: Tympanic membrane normal.      Nose: Nose normal. No congestion or rhinorrhea. Mouth/Throat:      Mouth: Mucous membranes are moist.   Eyes:      Extraocular Movements: Extraocular movements intact. Conjunctiva/sclera: Conjunctivae normal.      Pupils: Pupils are equal, round, and reactive to light. Neck:      Musculoskeletal: Normal range of motion and neck supple. No neck rigidity or muscular tenderness.    Cardiovascular:      Rate and Rhythm: Normal rate and regular rhythm. Pulses: Normal pulses. Heart sounds: Normal heart sounds. No murmur. Pulmonary:      Effort: Pulmonary effort is normal. No respiratory distress. Breath sounds: Normal breath sounds. No stridor. No wheezing. Abdominal:      General: Abdomen is flat. Bowel sounds are normal. There is no distension. Palpations: Abdomen is soft. Tenderness: There is no abdominal tenderness. There is no guarding. Musculoskeletal: Normal range of motion. Lymphadenopathy:      Cervical: No cervical adenopathy. Skin:     General: Skin is warm and dry. Capillary Refill: Capillary refill takes less than 2 seconds. Neurological:      General: No focal deficit present. Mental Status: She is alert and oriented to person, place, and time. Mental status is at baseline. Psychiatric:         Mood and Affect: Mood normal.         Behavior: Behavior normal.          MDM  Number of Diagnoses or Management Options  Elevated serum creatinine:   Leukocytosis, unspecified type:   Diagnosis management comments: 82yo F with hx of lupus, nonHodgkins Lymphoma in remission since 2013, COPD, hypothyroidism, NSTEMI, CAD, HTN presenting for vomiting and with a WBC 23.9 with left shift, Na 132, Cr 1.54. Concern for COVID, viral gastroenteritis, recurrent lymphoma. Patient tolerating PO intake in ED without recurrent nausea nor vomiting.     EKG: Sinus Rhythm at 82bpm with occasional PACs, normal axis, regular intervals, non-specific ST changes comparable to last EKG 6/22/2020       Amount and/or Complexity of Data Reviewed  Clinical lab tests: ordered and reviewed  Tests in the radiology section of CPT®: ordered and reviewed  Tests in the medicine section of CPT®: ordered      Patient signed out to Dr Quincy Buerger at end of shift who will complete workup       Procedures

## 2020-12-04 ENCOUNTER — PATIENT OUTREACH (OUTPATIENT)
Dept: CASE MANAGEMENT | Age: 85
End: 2020-12-04

## 2020-12-04 LAB
COVID-19, XGCOVT: NOT DETECTED
HEALTH STATUS, XMCV2T: NORMAL
SOURCE, COVRS: NORMAL
SPECIMEN SOURCE, FCOV2M: NORMAL
SPECIMEN TYPE, XMCV1T: NORMAL

## 2020-12-04 NOTE — ED NOTES
Pt provided with water and abdulkadir crackers for PO challenge. Pt denies nausea; no vomiting or dry heaving noted.

## 2020-12-04 NOTE — PROGRESS NOTES
Patient contacted regarding COVID-19  risk. Discussed COVID-19 related testing which was pending at this time. Test results were pending. Patient informed of results, if available? Await results. Outreach made within 2 business days of discharge: Yes    Care Transition Nurse/ Ambulatory Care Manager/ LPN Care Coordinator contacted the patient by telephone to perform post discharge assessment. Verified name and  with patient as identifiers. Provided introduction to self, and explanation of the CTN/ACM/LPN role, and reason for call due to risk factors for infection and/or exposure to COVID-19. Symptoms reviewed with patient who verbalized the following symptoms: no new symptoms. Due to no new or worsening symptoms encounter was not routed to provider for escalation. Discussed follow-up appointments. If no appointment was previously scheduled, appointment scheduling offered: [facility] follow up appointment(s):   Future Appointments   Date Time Provider Jhoan Jarvis   2020  3:20 PM Amanda Mix MD [facility]     Non-BS follow up appointment(s): none      Advance Care Planning:   Does patient have an Advance Directive: currently not on file; education provided     Patient has following risk factors of: CAD. CTN/ACM/LPN reviewed discharge instructions, medical action plan and red flags such as increased shortness of breath, increasing fever and signs of decompensation with patient who verbalized understanding. Discussed exposure protocols and quarantine with CDC Guidelines What to do if you are sick with coronavirus disease .  Patient was given an opportunity for questions and concerns. The patient agrees to contact the [facility] exposure line [phone], local [facility] department [facility]  ([phone]) and PCP office for questions related to their healthcare. CTN/ACM provided contact information for future needs.     Reviewed and educated patient on any new and changed medications related to discharge diagnosis. Patient/family/caregiver given information for [employer] and agrees to enroll no  14 day call based on severity of symptoms and risk factors.

## 2020-12-04 NOTE — DISCHARGE INSTRUCTIONS
Patient Education        Learning About High White Blood Cell Counts  What are white blood cells? White blood cells (leukocytes) help protect your body from infection. Normally, when germs get inside your body, your body makes more white blood cells that search for and destroy the germs. Less often, there are medical problems where the body may make a lot more white blood cells than it needs. What happens when you have a high white blood cell count? Your white blood cell count may be high because your body is fighting an infection. But other things can cause it, such as some medicines, burns, an illness, or other health problems. When your doctor sees that your white blood cell count is high, he or she will try to find out why, and then treat the cause. What are the symptoms? A high white blood cell count alone doesn't cause any symptoms. The symptoms you feel may come from the medical problem that your white blood cells are fighting. For example, if you have pneumonia, you may have a fever and trouble breathing. These are symptoms of pneumonia, not of a high white blood cell count. How is it treated? · Your doctor may do more tests to find the problem that's making your white blood cell count high. Once your doctor finds the problem, he or she may be able to treat it. · Part of your treatment may be telling your doctor if you feel worse. Watch your temperature, and call your doctor if your fever goes up and stays up. Follow-up care is a key part of your treatment and safety. Be sure to make and go to all appointments, and call your doctor if you are having problems. It's also a good idea to know your test results and keep a list of the medicines you take. Where can you learn more? Go to http://www.gray.com/  Enter V383 in the search box to learn more about \"Learning About High White Blood Cell Counts. \"  Current as of: December 9, 2019               Content Version: 12.6  © 9936-2207 [employer], [employer]orporated. Care instructions adapted under license by [employer] (which disclaims liability or warranty for this information). If you have questions about a medical condition or this instruction, always ask your healthcare professional. [place] [address] any warranty or liability for your use of this information.

## 2020-12-04 NOTE — ED NOTES
Patient does not appear to be in any acute distress/shows no evidence of clinical instability at this time. Provider has reviewed discharge instructions with the patient/family. The patient/family verbalized understanding instructions as well as need for follow up for any further symptoms.     Discharge papers given, education provided, and any questions answered. Patient discharged by provider. Self quarantine education emphasized, pt verbalized understanding. Patient is a 49 [facility]   • Blanchard Valley Health System Blanchard Valley 

## 2020-12-06 LAB
ATRIAL RATE: 81 BPM
CALCULATED P AXIS, ECG09: 81 DEGREES
CALCULATED R AXIS, ECG10: -19 DEGREES
CALCULATED T AXIS, ECG11: 103 DEGREES
DIAGNOSIS, 93000: NORMAL
P-R INTERVAL, ECG05: 148 MS
Q-T INTERVAL, ECG07: 394 MS
QRS DURATION, ECG06: 110 MS
QTC CALCULATION (BEZET), ECG08: 457 MS
VENTRICULAR RATE, ECG03: 81 BPM

## 2020-12-07 ENCOUNTER — PATIENT OUTREACH (OUTPATIENT)
Dept: CASE MANAGEMENT | Age: 85
End: 2020-12-07

## 2020-12-07 NOTE — PROGRESS NOTES
Patient contacted regarding COVID-19  risk. Discussed COVID-19 related testing which was available at this time. Test results were negative. Patient informed of results, if available? yes. Outreach made within 2 business days of discharge: Yes    Care Transition Nurse/ Ambulatory Care Manager/ LPN Care Coordinator contacted the patient by telephone to perform post discharge assessment. Verified name and  with patient as identifiers. Provided introduction to self, and explanation of the CTN/ACM/LPN role, and reason for call due to risk factors for infection and/or exposure to COVID-19. Symptoms reviewed with patient who verbalized the following symptoms: no new symptoms. Due to no new or worsening symptoms encounter was not routed to provider for escalation. Discussed follow-up appointments. If no appointment was previously scheduled, appointment scheduling offered: [facility] follow up appointment(s):   Future Appointments   Date Time Provider Jhoan Jarvis   2020  3:20 PM [facility]     [facility] follow up appointment(s): none      Advance Care Planning:   Does patient have an Advance Directive: currently not on file; patient declined education    Patient has following risk factors of: CAD. CTN/ACM/LPN reviewed discharge instructions, medical action plan and red flags such as increased shortness of breath, increasing fever and signs of decompensation with patient who verbalized understanding. Discussed exposure protocols and quarantine with CDC Guidelines What to do if you are sick with coronavirus disease .  Patient was given an opportunity for questions and concerns. The patient agrees to contact the [facility] exposure line [phone], local [facility] department [facility]  ([phone]) and PCP office for questions related to their healthcare. CTN/ACM provided contact information for future needs.     Reviewed and educated patient on any new and changed medications related to discharge diagnosis. Patient/family/caregiver given information for [employer] and agrees to enroll no  14 day call based on severity of symptoms and risk factors.

## 2020-12-17 ENCOUNTER — PATIENT OUTREACH (OUTPATIENT)
Dept: CASE MANAGEMENT | Age: 85
End: 2020-12-17

## 2021-01-07 ENCOUNTER — TELEPHONE (OUTPATIENT)
Dept: CARDIOLOGY CLINIC | Age: 86
End: 2021-01-07

## 2021-01-07 NOTE — TELEPHONE ENCOUNTER
Spoke with pt's family member, Mahogany Phillips (HIPPA aprroved) verified name and  of pt. Daughter requesting an appointment sooner then 3/19/21. I rescheduled the pt for 21 @1500. Mahogany Phillips was in agreement.

## 2021-01-07 NOTE — TELEPHONE ENCOUNTER
Patient's daughter calling in regards to appointment set for 03/19. Requesting a closer date due to 2 major hospitalizations.      Phone: [phone]

## 2021-01-21 NOTE — PROGRESS NOTES
Mercy Ribera MD    [address] [facility], [address] [place]    Office [phone],DWD [phone]      Stephany Marin is a 81 y.o. female is here for f/u visit . Primary care physician:  Mari Torres MD      Dear Vania Weston,    I had the pleasure of seeing Ms. Stephany Marin in the office today. Assessment:    Chronic diastolic heart failure (HFpEF)  CAD - NSTEMI(7/15/19 )- PCI to RCA with VINOD  PVCs  HTN  HLD  Hx of Hypothyroid/SLE/Non Hodgkin's Lymphoma in remission ( last treatment 2013)  History of lupus/rheumatoid arthritis  History of CLARISSE  CKD   Anemia -  COPD on inhalers    Plan:     Decrease Lasix 40 mg to 20 mg daily. Can take extra Lasix based on weight gain. Parameters given. DC Prinivil. Increase metoprolol 25 mg half tablet twice daily to 1 tablet twice daily  On- aspirin/Plavix - . If there is concern for bleeding/anemia-can stop Plavix. Aggressive cardiovascular risk factor modification. Follow-up in12 weeks or earlier as needed. Patient understands the plan. All questions were answered to the patient's satisfaction. Medication Side Effects and Warnings were discussed with patient: yes  Patient Labs were reviewed and or requested:  yes  Patient Past Records were reviewed and or requested: yes    I appreciate the opportunity to be involved in [address] [place] [address] Dr. See note below for details. Please do not hesitate to contact us with questions or concerns. Mercy Ribera MD    Cardiac Testing/ Procedures: A. Cardiac Cath/PCI: 10/2/20  Access: right radial artery, 5F. Unable to engage left coronary from right radial.  Right CFA, 5F  Catheters:  Left coronary: JL 3.5, 5F (tried jl 3.5, al 1, tig 3, tig 4, BU 3.5, and Art.   runthrough wire was used to stabilize catheter during angiography  Right coronary: JR4, 5F     Findings:   L Main: large caliber, ostial calcified stenosis with MLA 8.2mm2 with eccentric calcium throughout left main that is non-occlusive  LAD: large caliber vessel, small d1 and moderate d2, no critical disease  LCx: large caliber, large om1, small om2 and small om3, no critical disease  RCA: moderate caliber, previously placed proximal RCA stent patent, small PDA and small RP branch     LVEDP:  12  mmhg           Specimens Removed: None     Implants: n/a     Closure Device: radial TR band     See full cath note.     Complications: none        Findings:  1. Multivessel cad with left main ostial stenosis with MLA 8.2 mm2 and patent RCA stent  2. Borderline elevation in lvedp     Plan:     Cont medical mgmt    July 2020    R Radial access  RCA - JR4  LCA - difficulty to access with TIG4;EBU 3.5     R CFA - micropuncture/ultrasound/fluorscopy guided access     L Main: Ca++; Ostial LM 10% ( good reflux/no dampening)     LAD: Med; MLI; D1 - MLI     LCflex: Med; MLI     RCA: Dominant  Prox 95%; Mid 50%; PDA and PLB - small; PLB branch diffuse severe dz        LVEDP:  15     LVEF: Not assessed; Nml by echo     No significant gradient across aortic valve.     PCI: JR4 guide ( with SH)  BMW wire  Pre dil with 2 by 12  VINOD 2.5 by 26  Post dil with 2.75 by 12 ( at high merary - approx 3mm)  GERALD 3 before and after           Specimens Removed : None     Closure Device: TR Band - R radial     R CFA - mynx          B. ECHO/AUNDREA: 7//15/19 - EF 56-60%; Mild AR, TR, CT, MR. Mod Pulm HTN    C.StressNuclear/Stress ECHO/Stress test:    D.Vascular:    E. EP:    F. Miscellaneous:    Subjective:  Tiara Garland is a 81 y.o. female who returns for follow up  today . No CP. Compliant with meds. Going to cardiac rehab. Reviewed cardiac rehab notes/rhythm strips. Patient having PVCs with exercise. No chest pain. Also denies any swelling the lower extremities, significant weight gain, dyspnea. At cardiac rehab over the last few visits, systolic blood pressures running low in the 100s.     (Also has been diagnosed with COPD (no history of smoking/remote history of secondhand smoking/worked in 2000 [employer]). Her inhalers have been changed and since then her dyspnea has improved.)  States that she has lost some weight but is trying to eat better. Her daughter-Cesia is a patient here/former nurse at [employer] [facility]    ROS:  (bold if positive, if negative)    SOB/SOUSA         Medications before admission:    Current Outpatient Medications   Medication Sig Dispense    ferrous sulfate 325 mg (65 mg iron) tablet      therapeutic multivitamin (THERAGRAN) tablet      fluticasone-umeclidinium-vilanterol (Trelegy Ellipta) 100-62.5-25 mcg inhaler      varicella-zoster recombinant, PF, (Shingrix, PF,) 50 mcg/0.5 mL susr injection as directed     metoprolol tartrate (LOPRESSOR) 25 mg tablet Take 1 Tab by mouth every twelve (12) hours. (Patient taking differently: Take 12.5 mg by mouth every twelve (12) hours. ) 30 Tab    furosemide (LASIX) 40 mg tablet Take 1 Tab by mouth daily. 30 Tab    lisinopriL (PRINIVIL, ZESTRIL) 5 mg tablet Take 1 Tab by mouth nightly. 30 Tab    ondansetron (ZOFRAN ODT) 8 mg disintegrating tablet Take 8 mg by mouth every eight (8) hours as needed for Nausea.  albuterol sulfate (PROVENTIL;VENTOLIN) 2.5 mg/0.5 mL nebu nebulizer solution by Nebulization route as needed for Wheezing.  acetaminophen (TYLENOL) 325 mg tablet Take  by mouth every four (4) hours as needed for Pain.  clopidogrel (PLAVIX) 75 mg tab Take 1 Tab by mouth daily. 30 Tab    hydroxychloroquine (PLAQUENIL) 200 mg tablet Take 200 mg by mouth daily.  montelukast (SINGULAIR) 10 mg tablet Take 10 mg by mouth daily.  omeprazole (PRILOSEC) 20 mg capsule Take 20 mg by mouth daily.  folic acid (FOLVITE) 1 mg tablet Take 1 mg by mouth daily.  aspirin 81 mg chewable tablet Take 81 mg by mouth nightly.  atorvastatin (LIPITOR) 20 mg tablet Take 20 mg by mouth daily.      levothyroxine (SYNTHROID) 125 mcg tablet Take 125 mcg by mouth Daily (before breakfast).  albuterol (PROVENTIL HFA, VENTOLIN HFA, PROAIR HFA) 90 mcg/actuation inhaler Take 2 Puffs by inhalation every six (6) hours as needed for Wheezing.  alendronate (FOSAMAX) 70 mg tablet Take 70 mg by mouth every seven (7) days.  Calcium-Cholecalciferol, D3, (CALCIUM 600 WITH VITAMIN D3) 600 mg(1,500mg) -400 unit cap Take 1 Tab by mouth daily. Indications: osteoporosis, a condition of weak bones     Spiriva Respimat 1.25 mcg/actuation inhaler Take 2 Puffs by inhalation daily.  aclidinium bromide (TUDORZA PRESSAIR) 400 mcg/actuation inhaler Take 1 Puff by inhalation.  MELOXICAM PO Take 15 mg by mouth daily.  budesonide-formoterol (SYMBICORT) 160-4.5 mcg/actuation HFAA Take 2 Puffs by inhalation two (2) times a day.  multivitamin (ONE A DAY) tablet Take 1 Tab by mouth daily. No current facility-administered medications for this visit. Family History of CAD:    No    Social History:  Current  Smoker  Yes    Physical Exam:  Visit Vitals  /62 (BP 1 Location: Left arm, BP Patient Position: Sitting)   Pulse 68   Ht 5' 2\" (1.575 m)   Wt 132 lb 6.4 oz (60.1 kg)   SpO2 97%   BMI 24.22 kg/m²          Gen: Well-developed, well-nourished, in no acute distress  Neck: Supple,No JVD, No Carotid Bruit,   Resp: No accessory muscle use, Clear breath sounds, No rales or rhonchi  Card: Regular Rate,Rythm,Normal S1, S2, 2/6 sys murmur+,No rubs or gallop. No thrills.    Abd:  Soft, BS+,   MSK: No cyanosis  Skin: No rashes    Neuro: moving all four extremities , follows commands appropriately  Psych:  Good insight, oriented to person, place , alert, Nml Affect  LE: No edema    EKG:       LABS:        Lab Results   Component Value Date/Time    WBC 23.9 (H) 12/03/2020 06:33 PM    HGB 12.2 12/03/2020 06:33 PM    HCT 37.8 12/03/2020 06:33 PM    PLATELET 128 87/39/4650 06:33 PM     Lab Results   Component Value Date/Time    Sodium 132 (L) 12/03/2020 06:33 PM    Potassium 4.1 12/03/2020 06:33 PM    Chloride 95 (L) 12/03/2020 06:33 PM    CO2 28 12/03/2020 06:33 PM    Anion gap 9 12/03/2020 06:33 PM    Glucose 115 (H) 12/03/2020 06:33 PM    BUN 25 (H) 12/03/2020 06:33 PM    Creatinine 1.54 (H) 12/03/2020 06:33 PM    BUN/Creatinine ratio 16 12/03/2020 06:33 PM    GFR est AA 39 (L) 12/03/2020 06:33 PM    GFR est non-AA 32 (L) 12/03/2020 06:33 PM    Calcium 9.1 12/03/2020 06:33 PM             Filippo Becker MD

## 2021-01-22 ENCOUNTER — OFFICE VISIT (OUTPATIENT)
Dept: CARDIOLOGY CLINIC | Age: 86
End: 2021-01-22
Payer: MEDICARE

## 2021-01-22 VITALS
HEART RATE: 68 BPM | HEIGHT: 62 IN | BODY MASS INDEX: 24.37 KG/M2 | WEIGHT: 132.4 LBS | OXYGEN SATURATION: 97 % | SYSTOLIC BLOOD PRESSURE: 102 MMHG | DIASTOLIC BLOOD PRESSURE: 62 MMHG

## 2021-01-22 DIAGNOSIS — I25.10 CORONARY ARTERY DISEASE INVOLVING NATIVE CORONARY ARTERY OF NATIVE HEART WITHOUT ANGINA PECTORIS: ICD-10-CM

## 2021-01-22 DIAGNOSIS — J44.9 CHRONIC OBSTRUCTIVE PULMONARY DISEASE, UNSPECIFIED COPD TYPE (HCC): ICD-10-CM

## 2021-01-22 DIAGNOSIS — I50.32 CHRONIC DIASTOLIC HEART FAILURE (HCC): ICD-10-CM

## 2021-01-22 DIAGNOSIS — E78.5 HYPERLIPIDEMIA LDL GOAL <70: ICD-10-CM

## 2021-01-22 DIAGNOSIS — I10 ESSENTIAL HYPERTENSION: ICD-10-CM

## 2021-01-22 DIAGNOSIS — I49.3 PVC (PREMATURE VENTRICULAR CONTRACTION): Primary | ICD-10-CM

## 2021-01-22 PROCEDURE — G8432 DEP SCR NOT DOC, RNG: HCPCS | Performed by: INTERNAL MEDICINE

## 2021-01-22 PROCEDURE — G8420 CALC BMI NORM PARAMETERS: HCPCS | Performed by: INTERNAL MEDICINE

## 2021-01-22 PROCEDURE — 1090F PRES/ABSN URINE INCON ASSESS: CPT | Performed by: INTERNAL MEDICINE

## 2021-01-22 PROCEDURE — G8754 DIAS BP LESS 90: HCPCS | Performed by: INTERNAL MEDICINE

## 2021-01-22 PROCEDURE — G8427 DOCREV CUR MEDS BY ELIG CLIN: HCPCS | Performed by: INTERNAL MEDICINE

## 2021-01-22 PROCEDURE — 1101F PT FALLS ASSESS-DOCD LE1/YR: CPT | Performed by: INTERNAL MEDICINE

## 2021-01-22 PROCEDURE — G8400 PT W/DXA NO RESULTS DOC: HCPCS | Performed by: INTERNAL MEDICINE

## 2021-01-22 PROCEDURE — 99214 OFFICE O/P EST MOD 30 MIN: CPT | Performed by: INTERNAL MEDICINE

## 2021-01-22 PROCEDURE — G8536 NO DOC ELDER MAL SCRN: HCPCS | Performed by: INTERNAL MEDICINE

## 2021-01-22 PROCEDURE — G8752 SYS BP LESS 140: HCPCS | Performed by: INTERNAL MEDICINE

## 2021-01-22 PROCEDURE — G0463 HOSPITAL OUTPT CLINIC VISIT: HCPCS | Performed by: INTERNAL MEDICINE

## 2021-01-22 RX ORDER — LANOLIN ALCOHOL/MO/W.PET/CERES
CREAM (GRAM) TOPICAL
COMMUNITY
Start: 2021-01-14 | End: 2021-12-03

## 2021-01-22 RX ORDER — THERA TABS 400 MCG
TAB ORAL
Status: ON HOLD | COMMUNITY
Start: 2021-01-14 | End: 2021-08-20

## 2021-01-22 NOTE — PROGRESS NOTES
Saray Garcia is a 81 y.o. female    Chief Complaint   Patient presents with    Follow-up     HF, COPD    Coronary Artery Disease       Chest pain No; some fluttering at times     SOB No    Dizziness No    Swelling No    Refills No    Visit Vitals  /62 (BP 1 Location: Left arm, BP Patient Position: Sitting)   Pulse 68   Ht 5' 2\" (1.575 m)   Wt 132 lb 6.4 oz (60.1 kg)   SpO2 97%   BMI 24.22 kg/m²       1. Have you been to the ER, urgent care clinic since your last visit? Hospitalized since your last visit? No    2. Have you seen or consulted any other health care providers outside of the [address] [place] [address] since your last visit? Include any pap smears or colon screening.   No

## 2021-01-22 NOTE — LETTER
1/22/2021 Patient: Celeste Mills YOB: 1935 Date of Visit: 1/22/2021 Alejandra Carolina MD 
[address] [address] [address] Fax: [phone] Dear Alejandra Carolina MD, Thank you for referring Ms. Celeste Mills to [facility] [address] [place] [address] for evaluation. My notes for this consultation are attached. If you have questions, please do not hesitate to call me. I look forward to following your patient along with you. Sincerely, Edwige Giraldo MD

## 2021-03-18 ENCOUNTER — TELEPHONE (OUTPATIENT)
Dept: CARDIOLOGY CLINIC | Age: 86
End: 2021-03-18

## 2021-03-18 NOTE — TELEPHONE ENCOUNTER
Patient's daughter, Michell Costello, states both cardiac rehab and patient's PCP sent EKG tracing to our office for MD to review. There is concern about patient's AFIB.  Michell Costello can be reached at  [phone]

## 2021-03-19 NOTE — TELEPHONE ENCOUNTER
Patient daughter is calling to speak with nurse in regards to patient ekg. She can be reached at [phone].

## 2021-03-22 ENCOUNTER — TELEPHONE (OUTPATIENT)
Dept: CARDIOLOGY CLINIC | Age: 86
End: 2021-03-22

## 2021-03-22 NOTE — TELEPHONE ENCOUNTER
Received fax from Hurley Boxer at [facility]. [facility] [address]. Per Dr Kierra Hill review he would like patient to be seen. Called and spoke with Hurley Boxer and patient was scheduled for an OV tomorrow at  10:40 am.    Verbalized understanding.

## 2021-03-22 NOTE — TELEPHONE ENCOUNTER
Francie from [facility] called; they have the patient there currently. She is having quite a few pauses, pulse 75 when she first came in. Went up to 113 just moving around a little bit. She has the EKG from PCP, are faxing it and their scans from rehab just now to [phone]. She is requesting a c/b at [phone].

## 2021-03-23 ENCOUNTER — OFFICE VISIT (OUTPATIENT)
Dept: CARDIOLOGY CLINIC | Age: 86
End: 2021-03-23
Payer: MEDICARE

## 2021-03-23 VITALS
DIASTOLIC BLOOD PRESSURE: 70 MMHG | SYSTOLIC BLOOD PRESSURE: 124 MMHG | HEART RATE: 68 BPM | OXYGEN SATURATION: 100 % | HEIGHT: 62 IN | BODY MASS INDEX: 24.88 KG/M2 | WEIGHT: 135.2 LBS

## 2021-03-23 DIAGNOSIS — I48.19 PERSISTENT ATRIAL FIBRILLATION (HCC): ICD-10-CM

## 2021-03-23 DIAGNOSIS — I10 ESSENTIAL HYPERTENSION: ICD-10-CM

## 2021-03-23 DIAGNOSIS — I50.32 CHRONIC DIASTOLIC HEART FAILURE (HCC): ICD-10-CM

## 2021-03-23 DIAGNOSIS — I25.10 CORONARY ARTERY DISEASE INVOLVING NATIVE CORONARY ARTERY OF NATIVE HEART WITHOUT ANGINA PECTORIS: Primary | ICD-10-CM

## 2021-03-23 DIAGNOSIS — J44.9 CHRONIC OBSTRUCTIVE PULMONARY DISEASE, UNSPECIFIED COPD TYPE (HCC): ICD-10-CM

## 2021-03-23 PROCEDURE — 99214 OFFICE O/P EST MOD 30 MIN: CPT | Performed by: INTERNAL MEDICINE

## 2021-03-23 PROCEDURE — 1090F PRES/ABSN URINE INCON ASSESS: CPT | Performed by: INTERNAL MEDICINE

## 2021-03-23 PROCEDURE — G8754 DIAS BP LESS 90: HCPCS | Performed by: INTERNAL MEDICINE

## 2021-03-23 PROCEDURE — G8400 PT W/DXA NO RESULTS DOC: HCPCS | Performed by: INTERNAL MEDICINE

## 2021-03-23 PROCEDURE — G8420 CALC BMI NORM PARAMETERS: HCPCS | Performed by: INTERNAL MEDICINE

## 2021-03-23 PROCEDURE — G8752 SYS BP LESS 140: HCPCS | Performed by: INTERNAL MEDICINE

## 2021-03-23 PROCEDURE — G8427 DOCREV CUR MEDS BY ELIG CLIN: HCPCS | Performed by: INTERNAL MEDICINE

## 2021-03-23 PROCEDURE — G8536 NO DOC ELDER MAL SCRN: HCPCS | Performed by: INTERNAL MEDICINE

## 2021-03-23 PROCEDURE — 1101F PT FALLS ASSESS-DOCD LE1/YR: CPT | Performed by: INTERNAL MEDICINE

## 2021-03-23 PROCEDURE — G8432 DEP SCR NOT DOC, RNG: HCPCS | Performed by: INTERNAL MEDICINE

## 2021-03-23 RX ORDER — METOPROLOL TARTRATE 25 MG/1
37.5 TABLET, FILM COATED ORAL 2 TIMES DAILY
Qty: 45 TAB | Refills: 3 | Status: ON HOLD | OUTPATIENT
Start: 2021-03-23 | End: 2021-05-07 | Stop reason: SDUPTHER

## 2021-03-23 RX ORDER — FLUTICASONE FUROATE, UMECLIDINIUM BROMIDE AND VILANTEROL TRIFENATATE 100; 62.5; 25 UG/1; UG/1; UG/1
POWDER RESPIRATORY (INHALATION) DAILY
COMMUNITY
Start: 2020-11-02 | End: 2021-03-23 | Stop reason: SDUPTHER

## 2021-03-23 NOTE — LETTER
3/24/2021 Patient: Tammy Thorpe YOB: 1935 Date of Visit: 3/23/2021 Dulce Sheldon MD 
[address] [address] [address] Fax: [phone] Dear Dulce Sheldon MD, Thank you for referring Ms. Tammy Thorpe to [facility] for evaluation. My notes for this consultation are attached. If you have questions, please do not hesitate to call me. I look forward to following your patient along with you. Sincerely, Windy Merchant MD

## 2021-03-23 NOTE — PATIENT INSTRUCTIONS
Increase metoprolol from 25 mg twice daily to 37.5 mg twice daily  Stop Plavix. Continue aspirin. Start Eliquis 5 mg twice daily. Atrial Fibrillation: Care Instructions  Your Care Instructions     Atrial fibrillation is an irregular and often fast heartbeat. Treating this condition is important for several reasons. It can cause blood clots, which can travel from your heart to your brain and cause a stroke. If you have a fast heartbeat, you may feel lightheaded, dizzy, and weak. An irregular heartbeat can also increase your risk for heart failure. Atrial fibrillation is often the result of another heart condition, such as high blood pressure or coronary artery disease. Making changes to improve your heart condition will help you stay healthy and active. Follow-up care is a key part of your treatment and safety. Be sure to make and go to all appointments, and call your doctor if you are having problems. It's also a good idea to know your test results and keep a list of the medicines you take. How can you care for yourself at home? Medicines    · Take your medicines exactly as prescribed. Call your doctor if you think you are having a problem with your medicine. You will get more details on the specific medicines your doctor prescribes.     · If your doctor has given you a blood thinner to prevent a stroke, be sure you get instructions about how to take your medicine safely. Blood thinners can cause serious bleeding problems.     · Do not take any vitamins, over-the-counter drugs, or herbal products without talking to your doctor first.   Lifestyle changes    · Do not smoke. Smoking can increase your chance of a stroke and heart attack. If you need help quitting, talk to your doctor about stop-smoking programs and medicines. These can increase your chances of quitting for good.     · Eat a heart-healthy diet.     · Stay at a healthy weight.  Lose weight if you need to.     · Limit alcohol to 2 drinks a day for men and 1 drink a day for women. Too much alcohol can cause health problems.     · Avoid colds and flu. Get a pneumococcal vaccine shot. If you have had one before, ask your doctor whether you need another dose. Get a flu shot every year. If you must be around people with colds or flu, wash your hands often. Activity    · If your doctor recommends it, get more exercise. Walking is a good choice. Bit by bit, increase the amount you walk every day. Try for at least 30 minutes on most days of the week. You also may want to swim, bike, or do other activities. Your doctor may suggest that you join a cardiac rehabilitation program so that you can have help increasing your physical activity safely.     · Start light exercise if your doctor says it is okay. Even a small amount will help you get stronger, have more energy, and manage stress. Walking is an easy way to get exercise. Start out by walking a little more than you did in the hospital. Gradually increase the amount you walk.     · When you exercise, watch for signs that your heart is working too hard. You are pushing too hard if you cannot talk while you are exercising. If you become short of breath or dizzy or have chest pain, sit down and rest immediately.     · Check your pulse regularly. Place two fingers on the artery at the palm side of your wrist, in line with your thumb. If your heartbeat seems uneven or fast, talk to your doctor. When should you call for help? Call 911 anytime you think you may need emergency care. For example, call if:    · You have symptoms of a heart attack. These may include:  ? Chest pain or pressure, or a strange feeling in the chest.  ? Sweating. ? Shortness of breath. ? Nausea or vomiting. ? Pain, pressure, or a strange feeling in the back, neck, jaw, or upper belly or in one or both shoulders or arms. ? Lightheadedness or sudden weakness. ? A fast or irregular heartbeat.   After you call 911, the  may tell you to chew 1 adult-strength or 2 to 4 low-dose aspirin. Wait for an ambulance. Do not try to drive yourself.     · You have symptoms of a stroke. These may include:  ? Sudden numbness, tingling, weakness, or loss of movement in your face, arm, or leg, especially on only one side of your body. ? Sudden vision changes. ? Sudden trouble speaking. ? Sudden confusion or trouble understanding simple statements. ? Sudden problems with walking or balance. ? A sudden, severe headache that is different from past headaches.     · You passed out (lost consciousness). Call your doctor now or seek immediate medical care if:    · You have new or increased shortness of breath.     · You feel dizzy or lightheaded, or you feel like you may faint.     · Your heart rate becomes irregular.     · You can feel your heart flutter in your chest or skip heartbeats. Tell your doctor if these symptoms are new or worse. Watch closely for changes in your health, and be sure to contact your doctor if you have any problems. Where can you learn more? Go to http://www.gray.com/  Enter U020 in the search box to learn more about \"Atrial Fibrillation: Care Instructions. \"  Current as of: December 16, 2019               Content Version: 12.6  © 5194-1400 [employer]. Care instructions adapted under license by [employer] (which disclaims liability or warranty for this information). If you have questions about a medical condition or this instruction, always ask your healthcare professional. [place] [address] any warranty or liability for your use of this information.

## 2021-03-23 NOTE — PROGRESS NOTES
Carolina Diop MD    [phone] [facility] [place], [address] [facility]    Office [phone],[facility] [phone]      Estee Garcia is a 81 y.o. female is here for f/u visit . Primary care physician:  Silvano Diaz MD      Dear Aren Zarco,    I had the pleasure of seeing Ms. Estee Garcia in the office today. Assessment:    A. fib with CVR-diagnosed on EKG 3/15/2021 at PCPs office (new diagnosis)  Chronic diastolic heart failure (HFpEF)  CAD - NSTEMI(7/15/19 )- PCI to RCA with VINOD  PVCs  HTN  HLD  Hx of Hypothyroid/SLE/Non Hodgkin's Lymphoma in remission ( last treatment 2013)  History of lupus/rheumatoid arthritis  History of CLARISSE  CKD   Anemia -  COPD on inhalers    Plan:     Increase metoprolol 25 mg twice daily to 37.5 mg twice daily. Advised to monitor heart rate/blood pressure closely since her blood pressure in the 120s. Patient is asymptomatic from A. fib standpoint. Discussed thromboembolic risk/high [id number] score with A. fib with patient/daughter. No obvious contraindications. Will start Eliquis 5 mg twice daily. Recheck BMP to evaluate renal function. Will also check TSH since patient has a history of hypothyroidism. History of anemia-we will check CBC. Follow-up in 3 to 4 weeks with echocardiogram prior to visit. If she still is in A. fib will consider DCCV. Patient is on both aspirin/Plavix. Will discontinue Plavix and continue aspirin 81 mg daily in addition to the Eliquis. Aggressive cardiovascular risk factor modification. Follow-up in4 weeks or earlier as needed. Patient understands the plan. All questions were answered to the patient's satisfaction. Medication Side Effects and Warnings were discussed with patient: yes  Patient Labs were reviewed and or requested:  yes  Patient Past Records were reviewed and or requested: yes    I appreciate the opportunity to be involved in [address] [place] [address]  See note below for details.  Please do not hesitate to contact us with questions or concerns. Jose Ugalde MD    Cardiac Testing/ Procedures: A. Cardiac Cath/PCI: 10/2/20  Access: right radial artery, 5F. Unable to engage left coronary from right radial.  Right CFA, 5F  Catheters:  Left coronary: JL 3.5, 5F (tried jl 3.5, al 1, tig 3, tig 4, BU 3.5, and Art. runthrough wire was used to stabilize catheter during angiography  Right coronary: JR4, 5F     Findings:   L Main: large caliber, ostial calcified stenosis with MLA 8.2mm2 with eccentric calcium throughout left main that is non-occlusive  LAD: large caliber vessel, small d1 and moderate d2, no critical disease  LCx: large caliber, large om1, small om2 and small om3, no critical disease  RCA: moderate caliber, previously placed proximal RCA stent patent, small PDA and small RP branch     LVEDP:  12  mmhg           Specimens Removed: None     Implants: n/a     Closure Device: radial TR band     See full cath note.     Complications: none        Findings:  1. Multivessel cad with left main ostial stenosis with MLA 8.2 mm2 and patent RCA stent  2. Borderline elevation in lvedp     Plan:     Cont medical [facility]    July 2020    R Radial access  RCA - JR4  LCA - difficulty to access with TIG4;EBU 3.5     R CFA - micropuncture/ultrasound/fluorscopy guided access     L Main: Ca++; Ostial LM 10% ( good reflux/no dampening)     LAD: Med; MLI; D1 - MLI     LCflex: Med; MLI     RCA: Dominant  Prox 95%; Mid 50%; PDA and PLB - small; PLB branch diffuse severe dz        LVEDP:  15     LVEF: Not assessed; Nml by echo     No significant gradient across aortic valve.     PCI: JR4 guide ( with SH)  BMW wire  Pre dil with 2 by 12  VINOD 2.5 by 26  Post dil with 2.75 by 12 ( at high merary - approx 3mm)  GERALD 3 before and after           Specimens Removed : None     Closure Device: TR Band - R radial     R CFA - mynx          B. ECHO/AUNDREA: 7//15/19 - EF 56-60%; Mild AR, TR, MO, MR.  Mod Pulm HTN    C.StressNuclear/Stress ECHO/Stress test:    D.Vascular:    E. EP:    F. Miscellaneous:    Subjective:  Shaheen Walsh is a 81 y.o. female who returns for follow up  today . No CP/palpitations. Mild chronic dyspnea on exertion which has not changed. Has been diagnosed with A. fib based on EKG 3/25/2021 and again in rehab. Also denies any swelling the lower extremities, significant weight gain, dyspnea. Reviewed EKG from 3/25/2021-A. fib with RVR, nonspecific ST-T changes, PVCs, heart rate 120. Reviewed cardiac rehab rhythm strips from 3/22/2021. A. fib with CVR-heart rate 75 bpm, blood pressure 128/60, PVCs at rest.  Per cardiac rehab report-heart rate with minimal exertion increased to greater than 110. Rehab was not performed and patient was advised to follow-up with cardiology. (Also has been diagnosed with COPD (no history of smoking/remote history of secondhand smoking/worked in 2000 [employer]). Her inhalers have been changed and since then her dyspnea has improved.)  States that she has lost some weight but is trying to eat better. Her daughter-Cesia is a patient here/former nurse at [employer] [facility]    ROS:  (bold if positive, if negative)             Medications before admission:    Current Outpatient Medications   Medication Sig Dispense    ferrous sulfate 325 mg (65 mg iron) tablet      therapeutic multivitamin (THERAGRAN) tablet      fluticasone-umeclidinium-vilanterol (Trelegy Ellipta) 100-62.5-25 mcg inhaler      varicella-zoster recombinant, PF, (Shingrix, PF,) 50 mcg/0.5 mL susr injection as directed     metoprolol tartrate (LOPRESSOR) 25 mg tablet Take 1 Tab by mouth every twelve (12) hours. 30 Tab    furosemide (LASIX) 40 mg tablet Take 1 Tab by mouth daily. (Patient taking differently: Take 40 mg by mouth daily. 1/2 tablet) 30 Tab    ondansetron (ZOFRAN ODT) 8 mg disintegrating tablet Take 8 mg by mouth every eight (8) hours as needed for Nausea.      albuterol sulfate (PROVENTIL;VENTOLIN) 2.5 mg/0.5 mL nebu nebulizer solution by Nebulization route as needed for Wheezing.  acetaminophen (TYLENOL) 325 mg tablet Take  by mouth every four (4) hours as needed for Pain.  clopidogrel (PLAVIX) 75 mg tab Take 1 Tab by mouth daily. 30 Tab    hydroxychloroquine (PLAQUENIL) 200 mg tablet Take 200 mg by mouth daily.  montelukast (SINGULAIR) 10 mg tablet Take 10 mg by mouth daily.  omeprazole (PRILOSEC) 20 mg capsule Take 20 mg by mouth daily.  folic acid (FOLVITE) 1 mg tablet Take 1 mg by mouth daily.  aspirin 81 mg chewable tablet Take 81 mg by mouth nightly.  atorvastatin (LIPITOR) 20 mg tablet Take 20 mg by mouth daily.  levothyroxine (SYNTHROID) 125 mcg tablet Take 125 mcg by mouth Daily (before breakfast).  albuterol (PROVENTIL HFA, VENTOLIN HFA, PROAIR HFA) 90 mcg/actuation inhaler Take 2 Puffs by inhalation every six (6) hours as needed for Wheezing.  alendronate (FOSAMAX) 70 mg tablet Take 70 mg by mouth every seven (7) days.  Calcium-Cholecalciferol, D3, (CALCIUM 600 WITH VITAMIN D3) 600 mg(1,500mg) -400 unit cap Take 1 Tab by mouth daily. Indications: osteoporosis, a condition of weak bones     Spiriva Respimat 1.25 mcg/actuation inhaler Take 2 Puffs by inhalation daily.  aclidinium bromide (TUDORZA PRESSAIR) 400 mcg/actuation inhaler Take 1 Puff by inhalation.  MELOXICAM PO Take 15 mg by mouth daily.  budesonide-formoterol (SYMBICORT) 160-4.5 mcg/actuation HFAA Take 2 Puffs by inhalation two (2) times a day.  multivitamin (ONE A DAY) tablet Take 1 Tab by mouth daily. No current facility-administered medications for this visit.         Family History of CAD:    No    Social History:  Current  Smoker  Yes    Physical Exam:  Visit Vitals  /70 (BP 1 Location: Left upper arm, BP Patient Position: Sitting)   Pulse 68   Ht 5' 2\" (1.575 m)   Wt 135 lb 3.2 oz (61.3 kg)   SpO2 100%   BMI 24.73 kg/m² Gen: Well-developed, well-nourished, in no acute distress  Neck: Supple,No JVD, No Carotid Bruit,   Resp: No accessory muscle use, Clear breath sounds, No rales or rhonchi  Card: Irregular rate,Rythm,Normal S1, S2, 2/6 sys murmur+,No rubs or gallop. No thrills.    Abd:  Soft, BS+,   MSK: No cyanosis  Skin: No rashes    Neuro: moving all four extremities , follows commands appropriately  Psych:  Good insight, oriented to person, place , alert, Nml Affect  LE: No edema    EKG:       LABS:        Lab Results   Component Value Date/Time    WBC 23.9 (H) 12/03/2020 06:33 PM    HGB 12.2 12/03/2020 06:33 PM    HCT 37.8 12/03/2020 06:33 PM    PLATELET 534 62/78/3208 06:33 PM     Lab Results   Component Value Date/Time    Sodium 132 (L) 12/03/2020 06:33 PM    Potassium 4.1 12/03/2020 06:33 PM    Chloride 95 (L) 12/03/2020 06:33 PM    CO2 28 12/03/2020 06:33 PM    Anion gap 9 12/03/2020 06:33 PM    Glucose 115 (H) 12/03/2020 06:33 PM    BUN 25 (H) 12/03/2020 06:33 PM    Creatinine 1.54 (H) 12/03/2020 06:33 PM    BUN/Creatinine ratio 16 12/03/2020 06:33 PM    GFR est AA 39 (L) 12/03/2020 06:33 PM    GFR est non-AA 32 (L) 12/03/2020 06:33 PM    Calcium 9.1 12/03/2020 06:33 PM             Jacob Haynes MD

## 2021-03-23 NOTE — TELEPHONE ENCOUNTER
Medication sample per VO Dr Maritza Kidd. Requested Prescriptions     Pending Prescriptions Disp Refills    apixaban (ELIQUIS) 5 mg tablet 56 Tab 0     Sig: Take 1 Tab by mouth two (2) times a day.

## 2021-03-23 NOTE — PROGRESS NOTES
Karon Ren is a 81 y.o. female    Chief Complaint   Patient presents with    Follow-up     Afib    Hypertension    Coronary Artery Disease       Chest pain No    SOB patient states some SOB with walking     Dizziness No    Swelling patient states some swelling in her ankles    Refills No    Visit Vitals  /70 (BP 1 Location: Left upper arm, BP Patient Position: Sitting)   Pulse 68   Ht 5' 2\" (1.575 m)   Wt 135 lb 3.2 oz (61.3 kg)   SpO2 100%   BMI 24.73 kg/m²       1. Have you been to the ER, urgent care clinic since your last visit? Hospitalized since your last visit? no    2. Have you seen or consulted any other health care providers outside of the [address] [place] [address] since your last visit? Include any pap smears or colon screening.   no

## 2021-03-25 ENCOUNTER — TELEPHONE (OUTPATIENT)
Dept: CARDIOLOGY CLINIC | Age: 86
End: 2021-03-25

## 2021-03-25 NOTE — TELEPHONE ENCOUNTER
----- Message from Soledad Ceballos MD sent at 3/24/2021 11:02 PM EDT -----  Labwork - mild renal impairment /otherwise OK

## 2021-03-25 NOTE — TELEPHONE ENCOUNTER
Verified patient with two patient identifiers. Called patient and informed her of lab work test results per Kesha Rodriguez mild renal impairment but otherwise ok. Patient verbalized understanding.

## 2021-04-22 NOTE — H&P (VIEW-ONLY)
Jorge Thakur MD 
 
[id number] [facility] [place], [address] [facility] Office [phone] Carolina Parra is a 81 y.o. female is here for f/u visit . Primary care physician: 
Katelyn Mccann MD 
 
 
Dear Angie Godwin, 
 
I had the pleasure of seeing Ms. Carolina Parra in the office today. Assessment: A. fib with CVR-diagnosed on EKG 3/15/2021 at PCPs office Chronic diastolic heart failure (HFpEF) CAD - NSTEMI(7/15/19 )- PCI to RCA with VINOD PVCs HTN 
HLD Hx of Hypothyroid/SLE/Non Hodgkin's Lymphoma in remission ( last treatment 2013) History of lupus/rheumatoid arthritis History of CLARISSE 
CKD Anemia - 
COPD on inhalers Plan:  
 
Patient has started having symptoms from A. fib-palpitations/fatigue/dyspnea. Discussed with patient/daughter about DCCV. RIBA procedure explained to patient. Will schedule. Tolerating Eliquis. Reviewed labs 3/23/2021-normal TSH Aggressive cardiovascular risk factor modification. Follow-up in4 weeks or earlier as needed. 04/23/21 ECHO ADULT COMPLETE 04/26/2021 4/26/2021 Narrative · LV: Calculated LVEF is 50%. Visually measured ejection fraction. Normal  
cavity size and wall thickness. Inconclusive left ventricular diastolic  
function. · RV: Mildly dilated right ventricle. Borderline low systolic function. · LA: Severely dilated left atrium. Left Atrium volume index is 55.8  
mL/m2. · RA: Severely dilated right atrium. · AV: Probably trileaflet aortic valve. Moderate aortic valve sclerosis  
with no significant stenosis. Aortic valve leaflet calcification present. Mild aortic valve regurgitation is present. · MV: Mitral valve thickening. Moderate mitral valve regurgitation is  
present. · TV: Non-specific thickening of the tricuspid valve. Moderate tricuspid  
valve regurgitation is present. · PV: Mild pulmonic valve regurgitation is present.  
· IVC: Mildly elevated central venous pressure (8 mmHg); IVC diameter is  
less than 21 mm and collapses less than 50% with respiration. · PA: Pulmonary arterial systolic pressure is 48 mmHg. · Pericardium: Trivial pericardial effusion. Irregular rhythm throughout exam 
  
  Signed by: Giorgio Santos MD  
 
 
Patient understands the plan. All questions were answered to the patient's satisfaction. Medication Side Effects and Warnings were discussed with patient: yes Patient Labs were reviewed and or requested:  yes Patient Past Records were reviewed and or requested: yes I appreciate the opportunity to be involved in [address] [place] [address] Dr. See note below for details. Please do not hesitate to contact us with questions or concerns. Anel Mancera MD 
 
Cardiac Testing/ Procedures: A. Cardiac Cath/PCI: 10/2/20 Access: right radial artery, 5F. Unable to engage left coronary from right radial.  Right CFA, 5F Catheters: 
Left coronary: JL 3.5, 5F (tried jl 3.5, al 1, tig 3, tig 4, BU 3.5, and Art. runthrough wire was used to stabilize catheter during angiography Right coronary: JR4, 5F 
  
Findings: L Main: large caliber, ostial calcified stenosis with MLA 8.2mm2 with eccentric calcium throughout left main that is non-occlusive LAD: large caliber vessel, small d1 and moderate d2, no critical disease LCx: large caliber, large om1, small om2 and small om3, no critical disease RCA: moderate caliber, previously placed proximal RCA stent patent, small PDA and small RP branch 
  LVEDP: 
12  mmhg 
  
  
  
Specimens Removed: None 
  
Implants: n/a 
  
Closure Device: radial TR band 
  
See full cath note. 
  
Complications: none 
  
  
Findings: 1. Multivessel cad with left main ostial stenosis with MLA 8.2 mm2 and patent RCA stent 2. Borderline elevation in lvedp 
  
Plan: 
  
Cont medical mgmt July 2020 R Radial access RCA - JR4 LCA - difficulty to access with TIG4;EBU 3.5 
  
R CFA - micropuncture/ultrasound/fluorscopy guided access 
  L Main: Ca++; Ostial LM 10% ( good reflux/no dampening) 
  
LAD: Med; MLI; D1 - MLI 
  
LCflex: Med; MLI 
  
RCA: Dominant  Prox 95%; Mid 50%; PDA and PLB - small; PLB branch diffuse severe dz 
  
  
LVEDP:  15 
  
LVEF: Not assessed; Nml by echo 
  
No significant gradient across aortic valve. 
  
PCI: JR4 guide ( with SH) BMW wire Pre dil with 2 by 12 VINOD 2.5 by 26 Post dil with 2.75 by 12 ( at high merary - approx 3mm) GERALD 3 before and after 
  
  
  
Specimens Removed : None 
  
Closure Device: TR Band - R radial 
  
R CFA - mynx 
  
  
 
B. ECHO/AUNDREA: 7//15/19 - EF 56-60%; Mild AR, TR, WI, MR. Mod Pulm HTN C.StressNuclear/Stress ECHO/Stress test: D.Vascular: 
 
E. EP: 
 
F. Miscellaneous: 
 
Subjective: 
Aicha Alejandro is a 81 y.o. female who returns for follow up  today . C/o palpitations. No CP. Mild chronic dyspnea on exertion which has not changed. Has been diagnosed with A. fib based on EKG 3/25/2021 and again in rehab. Also denies any swelling the lower extremities, significant weight gain. (Also has been diagnosed with COPD (no history of smoking/remote history of secondhand smoking/worked in 2000 [place]). Her inhalers have been changed and since then her dyspnea has improved.) States that she has lost some weight but is trying to eat better. Her daughter-Cesia is a patient here/former nurse at [employer] 
 
ROS: 
(bold if positive, if negative) Medications before admission: 
 
Current Outpatient Medications Medication Sig Dispense  metoprolol tartrate (LOPRESSOR) 25 mg tablet Take 1.5 Tabs by mouth two (2) times a day. 39 Tab  apixaban (ELIQUIS) 5 mg tablet Take 1 Tab by mouth two (2) times a day. 56 Tab  ferrous sulfate 325 mg (65 mg iron) tablet  therapeutic multivitamin (THERAGRAN) tablet  fluticasone-umeclidinium-vilanterol (Trelegy Ellipta) 100-62.5-25 mcg inhaler  furosemide (LASIX) 40 mg tablet Take 1 Tab by mouth daily. (Patient taking differently: Take 40 mg by mouth daily. 1/2 tablet) 30 Tab  ondansetron (ZOFRAN ODT) 8 mg disintegrating tablet Take 8 mg by mouth every eight (8) hours as needed for Nausea.  albuterol sulfate (PROVENTIL;VENTOLIN) 2.5 mg/0.5 mL nebu nebulizer solution by Nebulization route as needed for Wheezing.  hydroxychloroquine (PLAQUENIL) 200 mg tablet Take 200 mg by mouth daily.  montelukast (SINGULAIR) 10 mg tablet Take 10 mg by mouth daily.  omeprazole (PRILOSEC) 20 mg capsule Take 20 mg by mouth daily.  folic acid (FOLVITE) 1 mg tablet Take 1 mg by mouth daily.  aspirin 81 mg chewable tablet Take 81 mg by mouth nightly.  atorvastatin (LIPITOR) 20 mg tablet Take 20 mg by mouth daily.  levothyroxine (SYNTHROID) 125 mcg tablet Take 125 mcg by mouth Daily (before breakfast).  albuterol (PROVENTIL HFA, VENTOLIN HFA, PROAIR HFA) 90 mcg/actuation inhaler Take 2 Puffs by inhalation every six (6) hours as needed for Wheezing.  alendronate (FOSAMAX) 70 mg tablet Take 70 mg by mouth every seven (7) days.  Calcium-Cholecalciferol, D3, (CALCIUM 600 WITH VITAMIN D3) 600 mg(1,500mg) -400 unit cap Take 1 Tab by mouth daily. Indications: osteoporosis, a condition of weak bones  loratadine (CLARITIN) 10 mg tablet  varicella-zoster recombinant, PF, (Shingrix, PF,) 50 mcg/0.5 mL susr injection as directed  aclidinium bromide (TUDORZA PRESSAIR) 400 mcg/actuation inhaler Take 1 Puff by inhalation.  acetaminophen (TYLENOL) 325 mg tablet Take  by mouth every four (4) hours as needed for Pain. No current facility-administered medications for this visit. Family History of CAD:    No 
 
Social History:  Current  Smoker  Yes Physical Exam: 
Visit Vitals /80 (BP 1 Location: Left upper arm, BP Patient Position: Sitting) Pulse 92 Ht 5' 2\" (1.575 m) Wt 138 lb (62.6 kg) SpO2 100% BMI 25.24 kg/m² Gen: Well-developed, well-nourished, in no acute distress Neck: Supple,No JVD, No Carotid Bruit, Resp: No accessory muscle use, Clear breath sounds, No rales or rhonchi 
Card: Irregular rate,Rythm,Normal S1, S2, 2/6 sys murmur+,No rubs or gallop. No thrills. Abd:  Soft, BS+,  
MSK: No cyanosis Skin: No rashes Neuro: moving all four extremities , follows commands appropriately Psych:  Good insight, oriented to person, place , alert, Nml Affect LE: No edema EKG:  
 
 
LABS: 
 
 
 
Lab Results Component Value Date/Time WBC 7.5 03/23/2021 01:41 PM  
 HGB 12.2 03/23/2021 01:41 PM  
 HCT 39.2 03/23/2021 01:41 PM  
 PLATELET 326 99/00/7795 01:41 PM  
 
Lab Results Component Value Date/Time  Sodium 134 (L) 03/23/2021 01:41 PM  
 Potassium 5.1 03/23/2021 01:41 PM  
 Chloride 99 03/23/2021 01:41 PM  
 CO2 29 03/23/2021 01:41 PM  
 Anion gap 6 03/23/2021 01:41 PM  
 Glucose 84 03/23/2021 01:41 PM  
 BUN 26 (H) 03/23/2021 01:41 PM  
 Creatinine 1.11 (H) 03/23/2021 01:41 PM  
 BUN/Creatinine ratio 23 (H) 03/23/2021 01:41 PM  
 GFR est AA 57 (L) 03/23/2021 01:41 PM  
 GFR est non-AA 47 (L) 03/23/2021 01:41 PM  
 Calcium 9.2 03/23/2021 01:41 PM  
 
 
 
 
 
Crow Wong MD

## 2021-04-22 NOTE — PROGRESS NOTES
Harry Quezada MD    [id number] [facility] [place], [address] [facility]    Office [phone],HFL [phone]      Meme Haney is a 81 y.o. female is here for f/u visit . Primary care physician:  Rachel Shaffer MD      Dear Berenice [place],    I had the pleasure of seeing Ms. Meme Haney in the office today. Assessment:    A. fib with CVR-diagnosed on EKG 3/15/2021 at PCPs office   Chronic diastolic heart failure (HFpEF)  CAD - NSTEMI(7/15/19 )- PCI to RCA with VINOD  PVCs  HTN  HLD  Hx of Hypothyroid/SLE/Non Hodgkin's Lymphoma in remission ( last treatment 2013)  History of lupus/rheumatoid arthritis  History of CLARISSE  CKD   Anemia -  COPD on inhalers    Plan:     Patient has started having symptoms from A. fib-palpitations/fatigue/dyspnea. Discussed with patient/daughter about DCCV. RIBA procedure explained to patient. Will schedule. Tolerating Eliquis. Reviewed labs 3/23/2021-normal TSH  Aggressive cardiovascular risk factor modification. Follow-up in4 weeks or earlier as needed. 04/23/21   ECHO ADULT COMPLETE 04/26/2021 4/26/2021    Narrative · LV: Calculated LVEF is 50%. Visually measured ejection fraction. Normal   cavity size and wall thickness. Inconclusive left ventricular diastolic   function. · RV: Mildly dilated right ventricle. Borderline low systolic function. · LA: Severely dilated left atrium. Left Atrium volume index is 55.8   mL/m2. · RA: Severely dilated right atrium. · AV: Probably trileaflet aortic valve. Moderate aortic valve sclerosis   with no significant stenosis. Aortic valve leaflet calcification present. Mild aortic valve regurgitation is present. · MV: Mitral valve thickening. Moderate mitral valve regurgitation is   present. · TV: Non-specific thickening of the tricuspid valve. Moderate tricuspid   valve regurgitation is present. · PV: Mild pulmonic valve regurgitation is present.   · IVC: Mildly elevated central venous pressure (8 mmHg); IVC diameter is   less than 21 mm and collapses less than 50% with respiration. · PA: Pulmonary arterial systolic pressure is 48 mmHg. · Pericardium: Trivial pericardial effusion. Irregular rhythm throughout exam       Signed by: Cordell Dias MD       Patient understands the plan. All questions were answered to the patient's satisfaction. Medication Side Effects and Warnings were discussed with patient: yes  Patient Labs were reviewed and or requested:  yes  Patient Past Records were reviewed and or requested: yes    I appreciate the opportunity to be involved in [address] [place] [address] Dr. See note below for details. Please do not hesitate to contact us with questions or concerns. Severino Cid MD    Cardiac Testing/ Procedures: A. Cardiac Cath/PCI: 10/2/20  Access: right radial artery, 5F. Unable to engage left coronary from right radial.  Right CFA, 5F  Catheters:  Left coronary: JL 3.5, 5F (tried jl 3.5, al 1, tig 3, tig 4, BU 3.5, and Art. runthrough wire was used to stabilize catheter during angiography  Right coronary: JR4, 5F     Findings:   L Main: large caliber, ostial calcified stenosis with MLA 8.2mm2 with eccentric calcium throughout left main that is non-occlusive  LAD: large caliber vessel, small d1 and moderate d2, no critical disease  LCx: large caliber, large om1, small om2 and small om3, no critical disease  RCA: moderate caliber, previously placed proximal RCA stent patent, small PDA and small RP branch     LVEDP:  12  mmhg           Specimens Removed: None     Implants: n/a     Closure Device: radial TR band     See full cath note.     Complications: none        Findings:  1. Multivessel cad with left main ostial stenosis with MLA 8.2 mm2 and patent RCA stent  2.  Borderline elevation in lvedp     Plan:     Cont medical mgmt    July 2020    R Radial access  RCA - JR4  LCA - difficulty to access with TIG4;EBU 3.5     R CFA - micropuncture/ultrasound/fluorscopy guided access     L Main: Ca++; Ostial LM 10% ( good reflux/no dampening)     LAD: Med; MLI; D1 - MLI     LCflex: Med; MLI     RCA: Dominant  Prox 95%; Mid 50%; PDA and PLB - small; PLB branch diffuse severe dz        LVEDP:  15     LVEF: Not assessed; Nml by echo     No significant gradient across aortic valve.     PCI: JR4 guide ( with SH)  BMW wire  Pre dil with 2 by 12  VINOD 2.5 by 26  Post dil with 2.75 by 12 ( at high merary - approx 3mm)  GERALD 3 before and after           Specimens Removed : None     Closure Device: TR Band - R radial     R CFA - mynx          B. ECHO/AUNDREA: 7//15/19 - EF 56-60%; Mild AR, TR, PA, MR. Mod Pulm HTN    C.StressNuclear/Stress ECHO/Stress test:    D.Vascular:    E. EP:    F. Miscellaneous:    Subjective:  Yi Duran is a 81 y.o. female who returns for follow up  today . C/o palpitations. No CP. Mild chronic dyspnea on exertion which has not changed. Has been diagnosed with A. fib based on EKG 3/25/2021 and again in rehab. Also denies any swelling the lower extremities, significant weight gain. (Also has been diagnosed with COPD (no history of smoking/remote history of secondhand smoking/worked in [address] [employer]). Her inhalers have been changed and since then her dyspnea has improved.)  States that she has lost some weight but is trying to eat better. Her daughter-Cesia is a patient here/former nurse at [employer]    ROS:  (bold if positive, if negative)             Medications before admission:    Current Outpatient Medications   Medication Sig Dispense    metoprolol tartrate (LOPRESSOR) 25 mg tablet Take 1.5 Tabs by mouth two (2) times a day. 45 Tab    apixaban (ELIQUIS) 5 mg tablet Take 1 Tab by mouth two (2) times a day.  56 Tab    ferrous sulfate 325 mg (65 mg iron) tablet      therapeutic multivitamin (THERAGRAN) tablet      fluticasone-umeclidinium-vilanterol (Trelegy Ellipta) 100-62.5-25 mcg inhaler      furosemide (LASIX) 40 mg tablet Take 1 Tab by mouth daily. (Patient taking differently: Take 40 mg by mouth daily. 1/2 tablet) 30 Tab    ondansetron (ZOFRAN ODT) 8 mg disintegrating tablet Take 8 mg by mouth every eight (8) hours as needed for Nausea.  albuterol sulfate (PROVENTIL;VENTOLIN) 2.5 mg/0.5 mL nebu nebulizer solution by Nebulization route as needed for Wheezing.  hydroxychloroquine (PLAQUENIL) 200 mg tablet Take 200 mg by mouth daily.  montelukast (SINGULAIR) 10 mg tablet Take 10 mg by mouth daily.  omeprazole (PRILOSEC) 20 mg capsule Take 20 mg by mouth daily.  folic acid (FOLVITE) 1 mg tablet Take 1 mg by mouth daily.  aspirin 81 mg chewable tablet Take 81 mg by mouth nightly.  atorvastatin (LIPITOR) 20 mg tablet Take 20 mg by mouth daily.  levothyroxine (SYNTHROID) 125 mcg tablet Take 125 mcg by mouth Daily (before breakfast).  albuterol (PROVENTIL HFA, VENTOLIN HFA, PROAIR HFA) 90 mcg/actuation inhaler Take 2 Puffs by inhalation every six (6) hours as needed for Wheezing.  alendronate (FOSAMAX) 70 mg tablet Take 70 mg by mouth every seven (7) days.  Calcium-Cholecalciferol, D3, (CALCIUM 600 WITH VITAMIN D3) 600 mg(1,500mg) -400 unit cap Take 1 Tab by mouth daily. Indications: osteoporosis, a condition of weak bones     loratadine (CLARITIN) 10 mg tablet      varicella-zoster recombinant, PF, (Shingrix, PF,) 50 mcg/0.5 mL susr injection as directed     aclidinium bromide (TUDORZA PRESSAIR) 400 mcg/actuation inhaler Take 1 Puff by inhalation.  acetaminophen (TYLENOL) 325 mg tablet Take  by mouth every four (4) hours as needed for Pain. No current facility-administered medications for this visit.         Family History of CAD:    No    Social History:  Current  Smoker  Yes    Physical Exam:  Visit Vitals  /80 (BP 1 Location: Left upper arm, BP Patient Position: Sitting)   Pulse 92   Ht 5' 2\" (1.575 m)   Wt 138 lb (62.6 kg)   SpO2 100%   BMI 25.24 kg/m²          Gen: Well-developed, well-nourished, in no acute distress  Neck: Supple,No JVD, No Carotid Bruit,   Resp: No accessory muscle use, Clear breath sounds, No rales or rhonchi  Card: Irregular rate,Rythm,Normal S1, S2, 2/6 sys murmur+,No rubs or gallop. No thrills.    Abd:  Soft, BS+,   MSK: No cyanosis  Skin: No rashes    Neuro: moving all four extremities , follows commands appropriately  Psych:  Good insight, oriented to person, place , alert, Nml Affect  LE: No edema    EKG:       LABS:        Lab Results   Component Value Date/Time    WBC 7.5 03/23/2021 01:41 PM    HGB 12.2 03/23/2021 01:41 PM    HCT 39.2 03/23/2021 01:41 PM    PLATELET 788 65/72/6979 01:41 PM     Lab Results   Component Value Date/Time    Sodium 134 (L) 03/23/2021 01:41 PM    Potassium 5.1 03/23/2021 01:41 PM    Chloride 99 03/23/2021 01:41 PM    CO2 29 03/23/2021 01:41 PM    Anion gap 6 03/23/2021 01:41 PM    Glucose 84 03/23/2021 01:41 PM    BUN 26 (H) 03/23/2021 01:41 PM    Creatinine 1.11 (H) 03/23/2021 01:41 PM    BUN/Creatinine ratio 23 (H) 03/23/2021 01:41 PM    GFR est AA 57 (L) 03/23/2021 01:41 PM    GFR est non-AA 47 (L) 03/23/2021 01:41 PM    Calcium 9.2 03/23/2021 01:41 PM             Anel Mancera MD

## 2021-04-23 ENCOUNTER — OFFICE VISIT (OUTPATIENT)
Dept: CARDIOLOGY CLINIC | Age: 86
End: 2021-04-23
Payer: MEDICARE

## 2021-04-23 ENCOUNTER — ANCILLARY PROCEDURE (OUTPATIENT)
Dept: CARDIOLOGY CLINIC | Age: 86
End: 2021-04-23

## 2021-04-23 VITALS
WEIGHT: 138 LBS | BODY MASS INDEX: 25.4 KG/M2 | SYSTOLIC BLOOD PRESSURE: 140 MMHG | HEIGHT: 62 IN | DIASTOLIC BLOOD PRESSURE: 82 MMHG

## 2021-04-23 VITALS
HEIGHT: 62 IN | WEIGHT: 138 LBS | SYSTOLIC BLOOD PRESSURE: 138 MMHG | BODY MASS INDEX: 25.4 KG/M2 | DIASTOLIC BLOOD PRESSURE: 80 MMHG | HEART RATE: 92 BPM | OXYGEN SATURATION: 100 %

## 2021-04-23 DIAGNOSIS — I10 ESSENTIAL HYPERTENSION: ICD-10-CM

## 2021-04-23 DIAGNOSIS — E78.5 HYPERLIPIDEMIA LDL GOAL <70: ICD-10-CM

## 2021-04-23 DIAGNOSIS — I50.32 CHRONIC DIASTOLIC HEART FAILURE (HCC): ICD-10-CM

## 2021-04-23 DIAGNOSIS — J44.9 CHRONIC OBSTRUCTIVE PULMONARY DISEASE, UNSPECIFIED COPD TYPE (HCC): ICD-10-CM

## 2021-04-23 DIAGNOSIS — I50.32 CHRONIC DIASTOLIC HEART FAILURE (HCC): Primary | ICD-10-CM

## 2021-04-23 DIAGNOSIS — I48.91 ATRIAL FIBRILLATION, UNSPECIFIED TYPE (HCC): ICD-10-CM

## 2021-04-23 DIAGNOSIS — I25.10 CORONARY ARTERY DISEASE INVOLVING NATIVE CORONARY ARTERY OF NATIVE HEART WITHOUT ANGINA PECTORIS: ICD-10-CM

## 2021-04-23 PROCEDURE — G8536 NO DOC ELDER MAL SCRN: HCPCS | Performed by: INTERNAL MEDICINE

## 2021-04-23 PROCEDURE — G8400 PT W/DXA NO RESULTS DOC: HCPCS | Performed by: INTERNAL MEDICINE

## 2021-04-23 PROCEDURE — 93306 TTE W/DOPPLER COMPLETE: CPT | Performed by: INTERNAL MEDICINE

## 2021-04-23 PROCEDURE — G8752 SYS BP LESS 140: HCPCS | Performed by: INTERNAL MEDICINE

## 2021-04-23 PROCEDURE — 99214 OFFICE O/P EST MOD 30 MIN: CPT | Performed by: INTERNAL MEDICINE

## 2021-04-23 PROCEDURE — G8754 DIAS BP LESS 90: HCPCS | Performed by: INTERNAL MEDICINE

## 2021-04-23 PROCEDURE — G8432 DEP SCR NOT DOC, RNG: HCPCS | Performed by: INTERNAL MEDICINE

## 2021-04-23 PROCEDURE — 1090F PRES/ABSN URINE INCON ASSESS: CPT | Performed by: INTERNAL MEDICINE

## 2021-04-23 PROCEDURE — G8419 CALC BMI OUT NRM PARAM NOF/U: HCPCS | Performed by: INTERNAL MEDICINE

## 2021-04-23 PROCEDURE — G8427 DOCREV CUR MEDS BY ELIG CLIN: HCPCS | Performed by: INTERNAL MEDICINE

## 2021-04-23 PROCEDURE — 1101F PT FALLS ASSESS-DOCD LE1/YR: CPT | Performed by: INTERNAL MEDICINE

## 2021-04-23 RX ORDER — LORATADINE 10 MG/1
TABLET ORAL
COMMUNITY
Start: 2021-03-15 | End: 2021-06-08 | Stop reason: ALTCHOICE

## 2021-04-23 RX ORDER — AMIODARONE HYDROCHLORIDE 200 MG/1
TABLET ORAL
Qty: 90 TAB | Refills: 1 | Status: ON HOLD | OUTPATIENT
Start: 2021-04-23 | End: 2021-05-07 | Stop reason: SDUPTHER

## 2021-04-23 NOTE — LETTER
4/28/2021 Patient: Pearl Mckeon YOB: 1935 Date of Visit: 4/23/2021 Janice Husain MD 
[id number] [address] [address] Via Fax: [phone] Dear Janice Husain MD, Thank you for referring Ms. Pearl Mckeon to [facility] for evaluation. My notes for this consultation are attached. If you have questions, please do not hesitate to call me. I look forward to following your patient along with you. Sincerely, Belén Toledo MD

## 2021-04-23 NOTE — PROGRESS NOTES
Iain Boyle is a 81 y.o. female    Chief Complaint   Patient presents with    Coronary Artery Disease    Hypertension       Chest pain No; some flutter    SOB patient states some SOB     Dizziness No    Swelling No    Refills No    Visit Vitals  /80 (BP 1 Location: Left upper arm, BP Patient Position: Sitting)   Pulse 92   Ht 5' 2\" (1.575 m)   Wt 138 lb (62.6 kg)   BMI 25.24 kg/m²       1. Have you been to the ER, urgent care clinic since your last visit? Hospitalized since your last visit? no    2. Have you seen or consulted any other health care providers outside of the [address] [place] [address] since your last visit? Include any pap smears or colon screening.   no

## 2021-04-26 LAB
ECHO AO ASC DIAM: 2.9 CM
ECHO AO ROOT DIAM: 2.99 CM
ECHO AV AREA PEAK VELOCITY: 2.69 CM2
ECHO AV AREA VTI: 2.89 CM2
ECHO AV AREA/BSA PEAK VELOCITY: 1.6 CM2/M2
ECHO AV AREA/BSA VTI: 1.8 CM2/M2
ECHO AV MEAN GRADIENT: 3.65 MMHG
ECHO AV PEAK GRADIENT: 6.4 MMHG
ECHO AV PEAK VELOCITY: 126.5 CM/S
ECHO AV VTI: 18.99 CM
ECHO EST RA PRESSURE: 8 MMHG
ECHO IVC PROX: 2.05 CM
ECHO LA AREA 4C: 22.59 CM2
ECHO LA MAJOR AXIS: 5.36 CM
ECHO LA MINOR AXIS: 3.28 CM
ECHO LA VOL 2C: 91.2 ML (ref 22–52)
ECHO LA VOL 4C: 72.42 ML (ref 22–52)
ECHO LA VOL BP: 90.94 ML (ref 22–52)
ECHO LA VOL/BSA BIPLANE: 55.7 ML/M2 (ref 16–28)
ECHO LA VOLUME INDEX A2C: 55.86 ML/M2 (ref 16–28)
ECHO LA VOLUME INDEX A4C: 44.36 ML/M2 (ref 16–28)
ECHO LV INTERNAL DIMENSION DIASTOLIC: 4.38 CM (ref 3.9–5.3)
ECHO LV INTERNAL DIMENSION SYSTOLIC: 3.25 CM
ECHO LV IVSD: 0.87 CM (ref 0.6–0.9)
ECHO LV MASS 2D: 124.2 G (ref 67–162)
ECHO LV MASS INDEX 2D: 76.1 G/M2 (ref 43–95)
ECHO LV POSTERIOR WALL DIASTOLIC: 0.9 CM (ref 0.6–0.9)
ECHO LVOT DIAM: 2.04 CM
ECHO LVOT PEAK GRADIENT: 4.34 MMHG
ECHO LVOT PEAK VELOCITY: 104.22 CM/S
ECHO LVOT SV: 54.9 ML
ECHO LVOT VTI: 16.82 CM
ECHO MV E DECELERATION TIME (DT): 116.69 MS
ECHO MV E VELOCITY: 98.35 CM/S
ECHO MV MAX VELOCITY: 112.31 CM/S
ECHO MV MEAN GRADIENT: 2.46 MMHG
ECHO MV PEAK GRADIENT: 5.05 MMHG
ECHO MV REGURGITANT VTIA: 156.09 CM
ECHO MV VTI: 14.88 CM
ECHO PV REGURGITANT MAX VELOCITY: 124.66 CM/S
ECHO RA AREA 4C: 20.66 CM2
ECHO RIGHT VENTRICULAR SYSTOLIC PRESSURE (RVSP): 47.92 MMHG
ECHO RV INTERNAL DIMENSION: 4.17 CM
ECHO RV TAPSE: 2.02 CM (ref 1.5–2)
ECHO TV REGURGITANT MAX VELOCITY: 315.92 CM/S
ECHO TV REGURGITANT PEAK GRADIENT: 39.92 MMHG
MR PISA PV: 554.38 CM/S

## 2021-04-28 ENCOUNTER — TELEPHONE (OUTPATIENT)
Dept: CARDIOLOGY CLINIC | Age: 86
End: 2021-04-28

## 2021-04-28 NOTE — TELEPHONE ENCOUNTER
Verified patient with two patient identifiers. Per patient she's been waiting for your phone call regarding DC/CV that you supposed to schedule. She want's you to call her back tomorrow afternoon and if she's not home please leave her a detail message.

## 2021-04-28 NOTE — TELEPHONE ENCOUNTER
Patient states that she was to receive a call by Tuesday to get scheduled \" to shock her heart\". Patient states that she sat by the phone yesterday and would just like to know the next step and when that appointment will be scheduled for. Please advise.     Phone: [phone]

## 2021-04-29 NOTE — TELEPHONE ENCOUNTER
Spoke with pt concerning DCCV procedure. Instructions given to pt per VO Dr. Ramos Villegas. Pt. NPO after midnight. Instructed patient to HOLD Lasix the day of the procedure. Hve someone available to drive pt to and from procedure. DCCV scheduled for 10:00 am on 5/7/21. Patient advised to arrive at 8:30 am and report to the first floor outpatient registration. Patient verbalized understanding. Opportunities for questions, clarifications, and concerns provided.

## 2021-05-04 DIAGNOSIS — I48.91 ATRIAL FIBRILLATION, UNSPECIFIED TYPE (HCC): Primary | ICD-10-CM

## 2021-05-06 ENCOUNTER — TELEPHONE (OUTPATIENT)
Dept: CARDIOLOGY CLINIC | Age: 86
End: 2021-05-06

## 2021-05-06 DIAGNOSIS — I48.91 ATRIAL FIBRILLATION, UNSPECIFIED TYPE (HCC): Primary | ICD-10-CM

## 2021-05-06 RX ORDER — SODIUM CHLORIDE 9 MG/ML
75 INJECTION, SOLUTION INTRAVENOUS CONTINUOUS
Status: CANCELLED | OUTPATIENT
Start: 2021-05-07

## 2021-05-06 RX ORDER — SODIUM CHLORIDE 0.9 % (FLUSH) 0.9 %
5-40 SYRINGE (ML) INJECTION AS NEEDED
Status: CANCELLED | OUTPATIENT
Start: 2021-05-07

## 2021-05-06 RX ORDER — SODIUM CHLORIDE 0.9 % (FLUSH) 0.9 %
5-40 SYRINGE (ML) INJECTION EVERY 8 HOURS
Status: CANCELLED | OUTPATIENT
Start: 2021-05-07

## 2021-05-06 NOTE — TELEPHONE ENCOUNTER
Patient requesting information on her cardioversion tomorrow. states she would like to know what to do to prepare since she did not received paperwork.      Phone: [phone]

## 2021-05-06 NOTE — TELEPHONE ENCOUNTER
Returned patient's call she received a telephone recording last night for her to arrive at 10:00 am on Friday for the scheduled Cardioversion. I informed patient she would need to arrive at 8:30 am for labs prior to the procedure that is scheduled for 10:00 am.    Patient verbalized understanding.

## 2021-05-07 ENCOUNTER — HOSPITAL ENCOUNTER (OUTPATIENT)
Dept: NON INVASIVE DIAGNOSTICS | Age: 86
Discharge: HOME | End: 2021-05-07
Attending: INTERNAL MEDICINE | Admitting: INTERNAL MEDICINE
Payer: MEDICARE

## 2021-05-07 VITALS
BODY MASS INDEX: 25.21 KG/M2 | DIASTOLIC BLOOD PRESSURE: 50 MMHG | RESPIRATION RATE: 17 BRPM | SYSTOLIC BLOOD PRESSURE: 111 MMHG | OXYGEN SATURATION: 97 % | HEIGHT: 62 IN | WEIGHT: 137 LBS | HEART RATE: 46 BPM

## 2021-05-07 DIAGNOSIS — I48.91 ATRIAL FIBRILLATION, UNSPECIFIED TYPE (HCC): ICD-10-CM

## 2021-05-07 LAB
ANION GAP SERPL CALC-SCNC: 4 MMOL/L (ref 5–15)
BUN SERPL-MCNC: 20 MG/DL (ref 6–20)
BUN/CREAT SERPL: 17 (ref 12–20)
CALCIUM SERPL-MCNC: 9.1 MG/DL (ref 8.5–10.1)
CHLORIDE SERPL-SCNC: 103 MMOL/L (ref 97–108)
CO2 SERPL-SCNC: 31 MMOL/L (ref 21–32)
CREAT SERPL-MCNC: 1.21 MG/DL (ref 0.55–1.02)
GLUCOSE SERPL-MCNC: 92 MG/DL (ref 65–100)
MAGNESIUM SERPL-MCNC: 2.3 MG/DL (ref 1.6–2.4)
POTASSIUM SERPL-SCNC: 4 MMOL/L (ref 3.5–5.1)
SODIUM SERPL-SCNC: 138 MMOL/L (ref 136–145)

## 2021-05-07 PROCEDURE — 92960 CARDIOVERSION ELECTRIC EXT: CPT

## 2021-05-07 PROCEDURE — 99152 MOD SED SAME PHYS/QHP 5/>YRS: CPT | Performed by: INTERNAL MEDICINE

## 2021-05-07 PROCEDURE — 92960 CARDIOVERSION ELECTRIC EXT: CPT | Performed by: INTERNAL MEDICINE

## 2021-05-07 PROCEDURE — 99152 MOD SED SAME PHYS/QHP 5/>YRS: CPT

## 2021-05-07 PROCEDURE — 77030018729 HC ELECTRD DEFIB PAD CARD -B

## 2021-05-07 PROCEDURE — 36415 COLL VENOUS BLD VENIPUNCTURE: CPT

## 2021-05-07 PROCEDURE — 83735 ASSAY OF MAGNESIUM: CPT

## 2021-05-07 PROCEDURE — 80048 BASIC METABOLIC PNL TOTAL CA: CPT

## 2021-05-07 PROCEDURE — 74011250636 HC RX REV CODE- 250/636: Performed by: INTERNAL MEDICINE

## 2021-05-07 RX ORDER — SODIUM CHLORIDE 9 MG/ML
75 INJECTION, SOLUTION INTRAVENOUS CONTINUOUS
Status: DISCONTINUED | OUTPATIENT
Start: 2021-05-07 | End: 2021-05-07 | Stop reason: HOSPADM

## 2021-05-07 RX ORDER — SODIUM CHLORIDE 0.9 % (FLUSH) 0.9 %
5-40 SYRINGE (ML) INJECTION AS NEEDED
Status: DISCONTINUED | OUTPATIENT
Start: 2021-05-07 | End: 2021-05-07 | Stop reason: HOSPADM

## 2021-05-07 RX ORDER — MIDAZOLAM HYDROCHLORIDE 1 MG/ML
.5-1 INJECTION, SOLUTION INTRAMUSCULAR; INTRAVENOUS
Status: DISCONTINUED | OUTPATIENT
Start: 2021-05-07 | End: 2021-05-07 | Stop reason: HOSPADM

## 2021-05-07 RX ORDER — FENTANYL CITRATE 50 UG/ML
25-200 INJECTION, SOLUTION INTRAMUSCULAR; INTRAVENOUS
Status: DISCONTINUED | OUTPATIENT
Start: 2021-05-07 | End: 2021-05-07 | Stop reason: HOSPADM

## 2021-05-07 RX ORDER — SODIUM CHLORIDE 0.9 % (FLUSH) 0.9 %
5-40 SYRINGE (ML) INJECTION EVERY 8 HOURS
Status: DISCONTINUED | OUTPATIENT
Start: 2021-05-07 | End: 2021-05-07 | Stop reason: HOSPADM

## 2021-05-07 RX ORDER — AMIODARONE HYDROCHLORIDE 200 MG/1
200 TABLET ORAL DAILY
Qty: 90 TAB | Refills: 1 | Status: ON HOLD | OUTPATIENT
Start: 2021-05-07 | End: 2021-07-26 | Stop reason: CLARIF

## 2021-05-07 RX ORDER — METOPROLOL TARTRATE 25 MG/1
25 TABLET, FILM COATED ORAL 2 TIMES DAILY
Qty: 180 TAB | Refills: 3 | Status: SHIPPED | OUTPATIENT
Start: 2021-05-07 | End: 2021-06-08 | Stop reason: SINTOL

## 2021-05-07 RX ADMIN — FENTANYL CITRATE 25 MCG: 50 INJECTION, SOLUTION INTRAMUSCULAR; INTRAVENOUS at 10:18

## 2021-05-07 RX ADMIN — FENTANYL CITRATE 25 MCG: 50 INJECTION, SOLUTION INTRAMUSCULAR; INTRAVENOUS at 10:21

## 2021-05-07 RX ADMIN — MIDAZOLAM 1 MG: 1 INJECTION INTRAMUSCULAR; INTRAVENOUS at 10:21

## 2021-05-07 RX ADMIN — MIDAZOLAM 1 MG: 1 INJECTION INTRAMUSCULAR; INTRAVENOUS at 10:18

## 2021-05-07 NOTE — INTERVAL H&P NOTE
Update History & Physical 
 
The Patient's History and Physical of 4/23/21 was reviewed with the patient and I examined the patient. There was no change. The surgical site was confirmed by the patient and me. Plan:  The risk, benefits, expected outcome, and alternative to the recommended procedure have been discussed with the patient. Patient understands and wants to proceed with the procedure.  
 
Electronically signed by Justin Christianson MD on 5/7/2021 at 10:08 AM

## 2021-05-07 NOTE — PROCEDURES
BRIEF PROCEDURE NOTE    Date of Procedure: 5/7/2021   Preoperative Diagnosis: atrial fibrillation  Postoperative Diagnosis: same   Procedure: Synchronized DC cardioversion  Cardiologist: Norberto Goss MD  Anesthesia:  IV sedation  Estimated Blood Loss: None  Findings: Following informed consent, the patient was sedated with intravenous midazolam and Fentanyl. 200 joule biphasic shock was delivered using anterior and posterior pads converting atrial fibrillation to sinus rhythm. No complications were noted. Complications: none    Patient's vital signs were stable and he/she was moving all four extremities at the end of procedure.     Norberto Goss MD

## 2021-05-07 NOTE — PROGRESS NOTES
Patient arrived to Non-Invasive Cardiology Lab for Out Patient DCCV Procedure. Staff introduced to patient. Patient identifiers verified with Name and Date of Birth. Procedure verified with patient. Consent forms reviewed and signed by patient or authorized representative and verified. Allergies verified. Patient informed of procedure and plan of care. Questions answered with review. Patient on cardiac monitor, non-invasive blood pressure, SPO2 monitor. Patient is A&Ox3. Patient reports no complaints. Patient belongings and valuables to remain in the room with patient. Patient on stretcher, in low position, with side rails up and brakes locked. Patient instructed to call for assistance as needed. Daughter in waiting room.

## 2021-05-10 ENCOUNTER — TELEPHONE (OUTPATIENT)
Dept: CARDIOLOGY CLINIC | Age: 86
End: 2021-05-10

## 2021-06-08 ENCOUNTER — OFFICE VISIT (OUTPATIENT)
Dept: CARDIOLOGY CLINIC | Age: 86
End: 2021-06-08
Payer: MEDICARE

## 2021-06-08 ENCOUNTER — TELEPHONE (OUTPATIENT)
Dept: CARDIOLOGY CLINIC | Age: 86
End: 2021-06-08

## 2021-06-08 VITALS
DIASTOLIC BLOOD PRESSURE: 76 MMHG | OXYGEN SATURATION: 99 % | BODY MASS INDEX: 25.32 KG/M2 | WEIGHT: 137.6 LBS | HEART RATE: 54 BPM | SYSTOLIC BLOOD PRESSURE: 132 MMHG | HEIGHT: 62 IN

## 2021-06-08 DIAGNOSIS — E78.5 HYPERLIPIDEMIA LDL GOAL <70: ICD-10-CM

## 2021-06-08 DIAGNOSIS — R00.1 BRADYCARDIA: Primary | ICD-10-CM

## 2021-06-08 DIAGNOSIS — R53.83 FATIGUE, UNSPECIFIED TYPE: ICD-10-CM

## 2021-06-08 DIAGNOSIS — R00.1 SINUS BRADYCARDIA: ICD-10-CM

## 2021-06-08 DIAGNOSIS — I50.32 CHRONIC DIASTOLIC HEART FAILURE (HCC): ICD-10-CM

## 2021-06-08 DIAGNOSIS — I25.10 CORONARY ARTERY DISEASE INVOLVING NATIVE CORONARY ARTERY OF NATIVE HEART WITHOUT ANGINA PECTORIS: ICD-10-CM

## 2021-06-08 DIAGNOSIS — I10 ESSENTIAL HYPERTENSION: Primary | ICD-10-CM

## 2021-06-08 DIAGNOSIS — I48.0 PAF (PAROXYSMAL ATRIAL FIBRILLATION) (HCC): ICD-10-CM

## 2021-06-08 PROCEDURE — 1101F PT FALLS ASSESS-DOCD LE1/YR: CPT | Performed by: INTERNAL MEDICINE

## 2021-06-08 PROCEDURE — G8752 SYS BP LESS 140: HCPCS | Performed by: INTERNAL MEDICINE

## 2021-06-08 PROCEDURE — G8432 DEP SCR NOT DOC, RNG: HCPCS | Performed by: INTERNAL MEDICINE

## 2021-06-08 PROCEDURE — G8419 CALC BMI OUT NRM PARAM NOF/U: HCPCS | Performed by: INTERNAL MEDICINE

## 2021-06-08 PROCEDURE — G8427 DOCREV CUR MEDS BY ELIG CLIN: HCPCS | Performed by: INTERNAL MEDICINE

## 2021-06-08 PROCEDURE — G8400 PT W/DXA NO RESULTS DOC: HCPCS | Performed by: INTERNAL MEDICINE

## 2021-06-08 PROCEDURE — G8754 DIAS BP LESS 90: HCPCS | Performed by: INTERNAL MEDICINE

## 2021-06-08 PROCEDURE — 1090F PRES/ABSN URINE INCON ASSESS: CPT | Performed by: INTERNAL MEDICINE

## 2021-06-08 PROCEDURE — 99214 OFFICE O/P EST MOD 30 MIN: CPT | Performed by: INTERNAL MEDICINE

## 2021-06-08 PROCEDURE — G8536 NO DOC ELDER MAL SCRN: HCPCS | Performed by: INTERNAL MEDICINE

## 2021-06-08 NOTE — PROGRESS NOTES
Raul Wheeler MD    [id number] [place], [address] [facility]    Office [phone],K [phone]      Darlene Escobar is a 81 y.o. female is here for f/u visit . Primary care physician:  Arabella Benavides MD      Dear Val Gonzalez,    I had the pleasure of seeing Ms. Darlene Escobar in the office today. Assessment:    A. fib with CVR-diagnosed on EKG 3/15/2021 at PCPs office. S/p hospital DCCV 5/7/2021. Sinus bradycardia  Chronic diastolic heart failure (HFpEF)  CAD - NSTEMI(7/15/19 )- PCI to RCA with VINOD  PVCs  HTN  HLD  Hx of Hypothyroid/SLE/Non Hodgkin's Lymphoma in remission ( last treatment 2013)  History of lupus/rheumatoid arthritis  History of CLARISSE  CKD   Anemia -  COPD on inhalers    Plan:       Discontinue metoprolol (has not taken it since her DCCV since her heart rate was trending low). 7-day Holter monitor. Appointment with Dr. Cortez Bhatia will need a pacemaker  Tolerating Eliquis. 3/23/2021-normal TSH  Aggressive cardiovascular risk factor modification. Follow-up in 6-12 weeks or earlier as needed. 04/23/21   ECHO ADULT COMPLETE 04/26/2021 4/26/2021    Narrative · LV: Calculated LVEF is 50%. Visually measured ejection fraction. Normal   cavity size and wall thickness. Inconclusive left ventricular diastolic   function. · RV: Mildly dilated right ventricle. Borderline low systolic function. · LA: Severely dilated left atrium. Left Atrium volume index is 55.8   mL/m2. · RA: Severely dilated right atrium. · AV: Probably trileaflet aortic valve. Moderate aortic valve sclerosis   with no significant stenosis. Aortic valve leaflet calcification present. Mild aortic valve regurgitation is present. · MV: Mitral valve thickening. Moderate mitral valve regurgitation is   present. · TV: Non-specific thickening of the tricuspid valve. Moderate tricuspid   valve regurgitation is present. · PV: Mild pulmonic valve regurgitation is present.   · IVC: Mildly elevated central venous pressure (8 mmHg); IVC diameter is   less than 21 mm and collapses less than 50% with respiration. · PA: Pulmonary arterial systolic pressure is 48 mmHg. · Pericardium: Trivial pericardial effusion. Irregular rhythm throughout exam       Signed by: Kimberly Frederick MD       Patient understands the plan. All questions were answered to the patient's satisfaction. Medication Side Effects and Warnings were discussed with patient: yes  Patient Labs were reviewed and or requested:  yes  Patient Past Records were reviewed and or requested: yes    I appreciate the opportunity to be involved in [address] [place] [address] Dr. See note below for details. Please do not hesitate to contact us with questions or concerns. Amari Alonso MD    Cardiac Testing/ Procedures: A. Cardiac Cath/PCI: 10/2/20  Access: right radial artery, 5F. Unable to engage left coronary from right radial.  Right CFA, 5F  Catheters:  Left coronary: JL 3.5, 5F (tried jl 3.5, al 1, tig 3, tig 4, BU 3.5, and Art. runthrough wire was used to stabilize catheter during angiography  Right coronary: JR4, 5F     Findings:   L Main: large caliber, ostial calcified stenosis with MLA 8.2mm2 with eccentric calcium throughout left main that is non-occlusive  LAD: large caliber vessel, small d1 and moderate d2, no critical disease  LCx: large caliber, large om1, small om2 and small om3, no critical disease  RCA: moderate caliber, previously placed proximal RCA stent patent, small PDA and small RP branch     LVEDP:  12  mmhg           Specimens Removed: None     Implants: n/a     Closure Device: radial TR band     See full cath note.     Complications: none        Findings:  1. Multivessel cad with left main ostial stenosis with MLA 8.2 mm2 and patent RCA stent  2.  Borderline elevation in lvedp     Plan:     Cont medical mgmt    July 2020    R Radial access  RCA - JR4  LCA - difficulty to access with TIG4;EBU 3.5     R CFA - micropuncture/ultrasound/fluorscopy guided access     L Main: Ca++; Ostial LM 10% ( good reflux/no dampening)     LAD: Med; MLI; D1 - MLI     LCflex: Med; MLI     RCA: Dominant  Prox 95%; Mid 50%; PDA and PLB - small; PLB branch diffuse severe dz        LVEDP:  15     LVEF: Not assessed; Nml by echo     No significant gradient across aortic valve.     PCI: JR4 guide ( with SH)  BMW wire  Pre dil with 2 by 12  VINOD 2.5 by 26  Post dil with 2.75 by 12 ( at high merary - approx 3mm)  GERALD 3 before and after           Specimens Removed : None     Closure Device: TR Band - R radial     R CFA - mynx          B. ECHO/AUNDREA: 7//15/19 - EF 56-60%; Mild AR, TR, NY, MR. Mod Pulm HTN    C.StressNuclear/Stress ECHO/Stress test:    D.Vascular:    E. EP: 5/7/2021-DCCV-A. fib to sinus rhythm    F. Miscellaneous:     Subjective:  Joana Domingo is a 81 y.o. female who returns for follow up  today . Patient states that she has been feeling tired and has been having headaches. She is also documented her blood pressure/heart rate and maintain a log. Her heart rate at times is in the high 30s. No dizziness, syncope. Systolic blood pressure ranged from 1 10-1 30s. She is on metoprolol but has not taken the medication except for twice since her DCCV. No chest pain. Mild chronic dyspnea on exertion. Also complains of fatigue. Denies any swelling the lower extremities, significant weight gain. Weight has been stable. (Also has been diagnosed with COPD (no history of smoking/remote history of secondhand smoking/worked in 2000 [employer]). Her inhalers have been changed and since then her dyspnea has improved.)        Her daughter-Cesia is a patient here/former nurse at [employer]    ROS:  (bold if positive, if negative)             Medications before admission:    Current Outpatient Medications   Medication Sig Dispense    metoprolol tartrate (LOPRESSOR) 25 mg tablet Take 1 Tab by mouth two (2) times a day.  (Patient taking differently: Take 25 mg by mouth two (2) times a day. 1 & 1/2) 180 Tab    amiodarone (CORDARONE) 200 mg tablet Take 1 Tab by mouth daily. Take 1 TAB TID for 3 days then 1 TAB daily thereafter (Patient taking differently: Take 200 mg by mouth daily.) 90 Tab    apixaban (ELIQUIS) 5 mg tablet Take 1 Tab by mouth two (2) times a day. 56 Tab    ferrous sulfate 325 mg (65 mg iron) tablet      therapeutic multivitamin (THERAGRAN) tablet      fluticasone-umeclidinium-vilanterol (Trelegy Ellipta) 100-62.5-25 mcg inhaler      varicella-zoster recombinant, PF, (Shingrix, PF,) 50 mcg/0.5 mL susr injection as directed     furosemide (LASIX) 40 mg tablet Take 1 Tab by mouth daily. (Patient taking differently: Take 40 mg by mouth daily. 1/2 tablet) 30 Tab    ondansetron (ZOFRAN ODT) 8 mg disintegrating tablet Take 8 mg by mouth every eight (8) hours as needed for Nausea.  hydroxychloroquine (PLAQUENIL) 200 mg tablet Take 200 mg by mouth daily.  montelukast (SINGULAIR) 10 mg tablet Take 10 mg by mouth daily.  omeprazole (PRILOSEC) 20 mg capsule Take 20 mg by mouth daily.  folic acid (FOLVITE) 1 mg tablet Take 1 mg by mouth daily.  aspirin 81 mg chewable tablet Take 81 mg by mouth nightly.  atorvastatin (LIPITOR) 20 mg tablet Take 20 mg by mouth daily.  levothyroxine (SYNTHROID) 125 mcg tablet Take 125 mcg by mouth Daily (before breakfast).  albuterol (PROVENTIL HFA, VENTOLIN HFA, PROAIR HFA) 90 mcg/actuation inhaler Take 2 Puffs by inhalation every six (6) hours as needed for Wheezing.  alendronate (FOSAMAX) 70 mg tablet Take 70 mg by mouth every seven (7) days.  Calcium-Cholecalciferol, D3, (CALCIUM 600 WITH VITAMIN D3) 600 mg(1,500mg) -400 unit cap Take 1 Tab by mouth daily. Indications: osteoporosis, a condition of weak bones     aclidinium bromide (TUDORZA PRESSAIR) 400 mcg/actuation inhaler Take 1 Puff by inhalation.      albuterol sulfate (PROVENTIL;VENTOLIN) 2.5 mg/0.5 mL nebu nebulizer solution by Nebulization route as needed for Wheezing.  acetaminophen (TYLENOL) 325 mg tablet Take  by mouth every four (4) hours as needed for Pain. No current facility-administered medications for this visit. Family History of CAD:    No    Social History:  Current  Smoker  Yes    Physical Exam:  Visit Vitals  /76 (BP 1 Location: Left upper arm, BP Patient Position: Sitting)   Pulse (!) 54   Ht 5' 2\" (1.575 m)   Wt 137 lb 9.6 oz (62.4 kg)   SpO2 99%   BMI 25.17 kg/m²          Gen: Well-developed, well-nourished, in no acute distress  Neck: Supple,No JVD, No Carotid Bruit,   Resp: No accessory muscle use, Clear breath sounds, No rales or rhonchi  Card: Irregular rate,Rythm,Normal S1, S2, 2/6 sys murmur+,No rubs or gallop. No thrills.    Abd:  Soft, BS+,   MSK: No cyanosis  Skin: No rashes    Neuro: moving all four extremities , follows commands appropriately  Psych:  Good insight, oriented to person, place , alert, Nml Affect  LE: No edema    EKG:       LABS:        Lab Results   Component Value Date/Time    WBC 7.5 03/23/2021 01:41 PM    HGB 12.2 03/23/2021 01:41 PM    HCT 39.2 03/23/2021 01:41 PM    PLATELET 078 21/79/6768 01:41 PM     Lab Results   Component Value Date/Time    Sodium 138 05/07/2021 09:00 AM    Potassium 4.0 05/07/2021 09:00 AM    Chloride 103 05/07/2021 09:00 AM    CO2 31 05/07/2021 09:00 AM    Anion gap 4 (L) 05/07/2021 09:00 AM    Glucose 92 05/07/2021 09:00 AM    BUN 20 05/07/2021 09:00 AM    Creatinine 1.21 (H) 05/07/2021 09:00 AM    BUN/Creatinine ratio 17 05/07/2021 09:00 AM    GFR est AA 51 (L) 05/07/2021 09:00 AM    GFR est non-AA 42 (L) 05/07/2021 09:00 AM    Calcium 9.1 05/07/2021 09:00 AM             Kalyn Spear MD

## 2021-06-08 NOTE — PROGRESS NOTES
Iain Boyle is a 81 y.o. female    Chief Complaint   Patient presents with    Follow-up    Hypertension    Coronary Artery Disease    Cholesterol Problem     Patient states HR has been running low    Chest pain No    SOB patient states SOB when walking     Dizziness No    Swelling No    Refills No    Visit Vitals  /76 (BP 1 Location: Left upper arm, BP Patient Position: Sitting)   Pulse (!) 54   Ht 5' 2\" (1.575 m)   Wt 137 lb 9.6 oz (62.4 kg)   SpO2 99%   BMI 25.17 kg/m²       1. Have you been to the ER, urgent care clinic since your last visit? Hospitalized since your last visit? No    2. Have you seen or consulted any other health care providers outside of the [address] [place] [address] since your last visit? Include any pap smears or colon screening.   No

## 2021-06-08 NOTE — LETTER
6/8/2021 Patient: Ada Castro YOB: 1935 Date of Visit: 6/8/2021 Pradip Desir MD 
[id number] [address] [place] [address] [address] Fax: [phone] Dear Pradip Desir MD, Thank you for referring Ms. Ada Castro to [facility] for evaluation. My notes for this consultation are attached. If you have questions, please do not hesitate to call me. I look forward to following your patient along with you. Sincerely, Jeison Nielson MD

## 2021-06-22 ENCOUNTER — TELEPHONE (OUTPATIENT)
Dept: CARDIOLOGY CLINIC | Age: 86
End: 2021-06-22

## 2021-06-22 ENCOUNTER — DOCUMENTATION ONLY (OUTPATIENT)
Dept: CARDIOLOGY CLINIC | Age: 86
End: 2021-06-22

## 2021-06-22 NOTE — PROGRESS NOTES
Multiple monitor alerts (7-day monitor 6/19/2021-6/25/2021)  A. fib with RVR. Usually rate in the 110s but has gone up to 130s/140s  On amiodarone-will discontinue  Has been on metoprolol previously but was discontinued because of bradycardia with heart rate in the 40s  If heart rate persistently greater than 120s, and blood pressure okay, patient will take half a tablet of 25 mg metoprolol to see if it helps. Has appointment to see Dr. Marie Partida 7/14/2021. 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 Abhi Fields MD, [facility]

## 2021-06-22 NOTE — TELEPHONE ENCOUNTER
Verified patient with two patient identifiers. Spoke with patient's daughter Randy Last regarding her BP and HR. June 19- 127/104   June 20- 144/103 HR 84  June 21- 119/101 HR 70  June 22- 126/102  and repeat @ 10:53 109/91 . She c/o fatigue,headache,nauseas but otherwise she said she is feeling okay and denies of dizziness or lightheadedness. Per daughter she started wearing monitor since Saturday. Metoprolol was discontinued last office visit since HR was trending low. Please advise.

## 2021-06-22 NOTE — TELEPHONE ENCOUNTER
Patients daughter Evelyn Booth is requesting to speak with a nurse regarding the patients blood pressure as it is starting to go up. Patients daughter states that she has several blood pressure readings that she would like to share with a nurse directly.      Phone: [phone]

## 2021-07-13 NOTE — PROGRESS NOTES
HISTORY OF PRESENTING ILLNESS      Jason Crockett is a 81 y.o. female referred for persistent AF with RVR despite being on amiodarone with borderline low systolic BP's and occasional bradycardia in the 30's. Amiodarone was discontinued as a result. She has a port in her right chest currently that is not being used. PAST MEDICAL HISTORY     Past Medical History:   Diagnosis Date    Asthma     [facility] obstructive pulmonary disease ([facility]     Hypertension     Hypothyroid     Lupus ([facility]     [facility]            PAST SURGICAL HISTORY     No past surgical history on file. ALLERGIES     Allergies   Allergen Reactions    Latex Rash          FAMILY HISTORY     Family History   Problem Relation Age of Onset    Heart Disease Mother     negative for cardiac disease       SOCIAL HISTORY     Social History     Socioeconomic History    Marital status:      Spouse name: Not on file    Number of children: Not on file    Years of education: Not on file    Highest education level: Not on file   Tobacco Use    Smoking status: Never Smoker    Smokeless tobacco: Never Used   Substance and Sexual Activity    Alcohol use: Never    Drug use: Never     Social Determinants of Health     Financial Resource Strain:     Difficulty of Paying Living Expenses:    Food Insecurity:     Worried About Running Out of Food in the Last Year:     Ran Out of Food in the Last Year:    Transportation Needs:     Lack of Transportation (Medical):      Lack of Transportation (Non-Medical):    Physical Activity:     Days of Exercise per Week:     Minutes of Exercise per Session:    Stress:     Feeling of Stress :    Social Connections:     Frequency of Communication with Friends and Family:     Frequency of Social Gatherings with Friends and Family:     Attends Worship Services:     Active Member of Clubs or Organizations:     Attends Club or Organization Meetings:     Marital Status:          MEDICATIONS     Current Outpatient Medications   Medication Sig    metoprolol tartrate (LOPRESSOR) 25 mg tablet 1-1/2 tablets    apixaban (ELIQUIS) 5 mg tablet Take 1 Tab by mouth two (2) times a day.  ferrous sulfate 325 mg (65 mg iron) tablet     fluticasone-umeclidinium-vilanterol (Trelegy Ellipta) 100-62.5-25 mcg inhaler     furosemide (LASIX) 40 mg tablet Take 1 Tab by mouth daily. (Patient taking differently: Take 40 mg by mouth daily. 1/2 tablet)    ondansetron (ZOFRAN ODT) 8 mg disintegrating tablet Take 8 mg by mouth every eight (8) hours as needed for Nausea.  albuterol sulfate (PROVENTIL;VENTOLIN) 2.5 mg/0.5 mL nebu nebulizer solution by Nebulization route as needed for Wheezing.  acetaminophen (TYLENOL) 325 mg tablet Take  by mouth every four (4) hours as needed for Pain.  hydroxychloroquine (PLAQUENIL) 200 mg tablet Take 200 mg by mouth daily.  montelukast (SINGULAIR) 10 mg tablet Take 10 mg by mouth daily.  omeprazole (PRILOSEC) 20 mg capsule Take 20 mg by mouth daily.  folic acid (FOLVITE) 1 mg tablet Take 1 mg by mouth daily.  aspirin 81 mg chewable tablet Take 81 mg by mouth nightly.  atorvastatin (LIPITOR) 20 mg tablet Take 20 mg by mouth daily.  levothyroxine (SYNTHROID) 125 mcg tablet Take 125 mcg by mouth Daily (before breakfast).  albuterol (PROVENTIL HFA, VENTOLIN HFA, PROAIR HFA) 90 mcg/actuation inhaler Take 2 Puffs by inhalation every six (6) hours as needed for Wheezing.  alendronate (FOSAMAX) 70 mg tablet Take 70 mg by mouth every seven (7) days.  Calcium-Cholecalciferol, D3, (CALCIUM 600 WITH VITAMIN D3) 600 mg(1,500mg) -400 unit cap Take 1 Tab by mouth daily. Indications: osteoporosis, a condition of weak bones    amiodarone (CORDARONE) 200 mg tablet Take 1 Tab by mouth daily.  Take 1 TAB TID for 3 days then 1 TAB daily thereafter (Patient not taking: Reported on 7/14/2021)    therapeutic multivitamin [facility]) tablet (Patient not taking: Reported on 7/14/2021)    varicella-zoster recombinant, PF, (Shingrix, PF,) 50 mcg/0.5 mL susr injection as directed (Patient not taking: Reported on 7/14/2021)    aclidinium bromide (TUDORZA PRESSAIR) 400 mcg/actuation inhaler Take 1 Puff by inhalation. (Patient not taking: Reported on 7/14/2021)     No current facility-administered medications for this visit. I have reviewed the nurses notes, vitals, problem list, allergy list, medical history, family, social history and medications. REVIEW OF SYMPTOMS      General: Pt denies excessive weight gain or loss. Pt is able to conduct ADL's  HEENT: Denies blurred vision, headaches, hearing loss, epistaxis and difficulty swallowing. Respiratory: Denies cough, congestion, shortness of breath, SOUSA, wheezing or stridor. Cardiovascular: Denies precordial pain, palpitations, edema or PND  Gastrointestinal: Denies poor appetite, indigestion, abdominal pain or blood in stool  Genitourinary: Denies hematuria, dysuria, increased urinary frequency  Musculoskeletal: Denies joint pain or swelling from muscles or joints  Neurologic: Denies tremor, paresthesias, headache, or sensory motor disturbance  Psychiatric: Denies confusion, insomnia, depression  Integumentray: Denies rash, itching or ulcers. Hematologic: Denies easy bruising, bleeding       PHYSICAL EXAMINATION      Vitals: see vitals section  General: Well developed, in no acute distress. HEENT: No jaundice, oral mucosa moist, no oral ulcers  Neck: Supple, no stiffness, no lymphadenopathy, supple  Heart:  irreg irreg, no murmur, gallop or rub, no jugular venous distention  Respiratory: Clear bilaterally x 4, no wheezing or rales  Abdomen:   Soft, non-tender, bowel sounds are active. Extremities:  No edema, normal cap refill, no cyanosis.   Musculoskeletal: No clubbing, no deformities  Neuro: A&Ox3, speech clear, gait stable, cooperative, no focal neurologic deficits  Skin: Skin color is normal. No rashes or lesions. Non diaphoretic, moist.  Vascular: 2+ pulses symmetric in all extremities       DIAGNOSTIC DATA      EKG:   Visit Vitals  /74 (BP 1 Location: Left upper arm, BP Patient Position: Sitting, BP Cuff Size: Adult)   Pulse 74   Resp 18   Ht 5' 2\" (1.575 m)   Wt 140 lb 9.6 oz (63.8 kg)   SpO2 99%   BMI 25.72 kg/m²        LABORATORY DATA      Lab Results   Component Value Date/Time    WBC 7.5 03/23/2021 01:41 PM    HGB 12.2 03/23/2021 01:41 PM    HCT 39.2 03/23/2021 01:41 PM    PLATELET 726 02/47/7789 01:41 PM    MCV 85.0 03/23/2021 01:41 PM      Lab Results   Component Value Date/Time    Sodium 138 05/07/2021 09:00 AM    Potassium 4.0 05/07/2021 09:00 AM    Chloride 103 05/07/2021 09:00 AM    CO2 31 05/07/2021 09:00 AM    Anion gap 4 (L) 05/07/2021 09:00 AM    Glucose 92 05/07/2021 09:00 AM    BUN 20 05/07/2021 09:00 AM    Creatinine 1.21 (H) 05/07/2021 09:00 AM    BUN/Creatinine ratio 17 05/07/2021 09:00 AM    GFR est AA 51 (L) 05/07/2021 09:00 AM    GFR est non-AA 42 (L) 05/07/2021 09:00 AM    Calcium 9.1 05/07/2021 09:00 AM    Bilirubin, total 0.4 03/23/2021 01:41 PM    Alk. phosphatase 95 03/23/2021 01:41 PM    Protein, total 7.4 03/23/2021 01:41 PM    Albumin 4.1 03/23/2021 01:41 PM    Globulin 3.3 03/23/2021 01:41 PM    A-G Ratio 1.2 03/23/2021 01:41 PM    ALT (SGPT) 18 03/23/2021 01:41 PM           ASSESSMENT      1. Atrial fibrillation   A. Persistent   B. RVR   C. Failed amiodarone  2. NHL   A. S/p radiation/chemo  3. Bradycardia         PLAN     Plan for single chamber Medtronic pacemaker using conscious sedation followed by AV node ablation with conscious sedation 1 month later. ICD-10-CM ICD-9-CM    1. Bradycardia  R00.1 427.89 CBC W/O DIFF      METABOLIC PANEL, BASIC      PROTHROMBIN TIME + INR      URINALYSIS W/ RFLX MICROSCOPIC   2. Palpitation  R00.2 785.1 CBC W/O DIFF      METABOLIC PANEL, BASIC      PROTHROMBIN TIME + INR      URINALYSIS W/ RFLX MICROSCOPIC   3. PAF (paroxysmal atrial fibrillation) ([facility])  I48.0 427.31 CBC W/O DIFF      METABOLIC PANEL, BASIC      PROTHROMBIN TIME + INR      URINALYSIS W/ RFLX MICROSCOPIC   4. Sinus bradycardia  R00.1 427.89 CBC W/O DIFF      METABOLIC PANEL, BASIC      PROTHROMBIN TIME + INR      URINALYSIS W/ RFLX MICROSCOPIC   5. Pre-procedure lab exam  Z01.812 V72.63 CBC W/O DIFF      METABOLIC PANEL, BASIC      PROTHROMBIN TIME + INR      URINALYSIS W/ RFLX MICROSCOPIC     Orders Placed This Encounter    CBC W/O DIFF     Standing Status:   Future     Standing Expiration Date:   3/32/7876    METABOLIC PANEL, BASIC     Standing Status:   Future     Standing Expiration Date:   2022    PROTHROMBIN TIME + INR     Standing Status:   Future     Standing Expiration Date:   2022    URINALYSIS W/ RFLX MICROSCOPIC     Standing Status:   Future     Standing Expiration Date:   2022    metoprolol tartrate (LOPRESSOR) 25 mg tablet     Si-1/2 tablets          FOLLOW-UP       Thank you, Kristopher Beard MD for allowing me to participate in the care of this extraordinarily pleasant female. Please do not hesitate to contact me for further questions/concerns.          John Goldberg MD  Cardiac Electrophysiology / Cardiology    [facility] [address]  [address], [address] [facility], [address] [place] [address]  [phone] / [phone] Fax   [phone] / [phone] Fax

## 2021-07-14 ENCOUNTER — OFFICE VISIT (OUTPATIENT)
Dept: CARDIOLOGY CLINIC | Age: 86
End: 2021-07-14
Payer: MEDICARE

## 2021-07-14 VITALS
DIASTOLIC BLOOD PRESSURE: 74 MMHG | RESPIRATION RATE: 18 BRPM | HEIGHT: 62 IN | BODY MASS INDEX: 25.88 KG/M2 | HEART RATE: 74 BPM | OXYGEN SATURATION: 99 % | SYSTOLIC BLOOD PRESSURE: 120 MMHG | WEIGHT: 140.6 LBS

## 2021-07-14 DIAGNOSIS — I48.0 PAF (PAROXYSMAL ATRIAL FIBRILLATION) (HCC): ICD-10-CM

## 2021-07-14 DIAGNOSIS — R00.1 BRADYCARDIA: Primary | ICD-10-CM

## 2021-07-14 DIAGNOSIS — R00.1 SINUS BRADYCARDIA: ICD-10-CM

## 2021-07-14 DIAGNOSIS — R00.2 PALPITATION: ICD-10-CM

## 2021-07-14 DIAGNOSIS — Z01.812 PRE-PROCEDURE LAB EXAM: ICD-10-CM

## 2021-07-14 PROCEDURE — G8536 NO DOC ELDER MAL SCRN: HCPCS | Performed by: INTERNAL MEDICINE

## 2021-07-14 PROCEDURE — G8427 DOCREV CUR MEDS BY ELIG CLIN: HCPCS | Performed by: INTERNAL MEDICINE

## 2021-07-14 PROCEDURE — 1090F PRES/ABSN URINE INCON ASSESS: CPT | Performed by: INTERNAL MEDICINE

## 2021-07-14 PROCEDURE — G8400 PT W/DXA NO RESULTS DOC: HCPCS | Performed by: INTERNAL MEDICINE

## 2021-07-14 PROCEDURE — G8752 SYS BP LESS 140: HCPCS | Performed by: INTERNAL MEDICINE

## 2021-07-14 PROCEDURE — 99205 OFFICE O/P NEW HI 60 MIN: CPT | Performed by: INTERNAL MEDICINE

## 2021-07-14 PROCEDURE — 1101F PT FALLS ASSESS-DOCD LE1/YR: CPT | Performed by: INTERNAL MEDICINE

## 2021-07-14 PROCEDURE — G8419 CALC BMI OUT NRM PARAM NOF/U: HCPCS | Performed by: INTERNAL MEDICINE

## 2021-07-14 PROCEDURE — G8510 SCR DEP NEG, NO PLAN REQD: HCPCS | Performed by: INTERNAL MEDICINE

## 2021-07-14 PROCEDURE — G8754 DIAS BP LESS 90: HCPCS | Performed by: INTERNAL MEDICINE

## 2021-07-14 RX ORDER — METOPROLOL TARTRATE 25 MG/1
TABLET, FILM COATED ORAL
Status: ON HOLD | COMMUNITY
Start: 2021-03-24 | End: 2021-07-26 | Stop reason: CLARIF

## 2021-07-14 NOTE — PATIENT INSTRUCTIONS
You are scheduled for the following procedure with Dr. Sonia Sheldon:  Single chamber pacemaker at [facility], [address] [facility], [place], [id number] [place] [address] [place]      PLEASE be aware that your procedure date/time is tentative and subject to change due to emergency cases. Procedure date/time:    July 26, 2021  Time: 8:30 am - please arrive by 7:00 am  - PACEMAKER        [facility] - Monday, 8/23/21 at 10:00 am (Arrive by 9:00 am)      ARRIVAL time:  (You will need  to be discharged home with.)     o [facility] procedures: please arrive to check in on 2nd floor two hours prior to your procedure the day of your procedure.    o [facility] procedures: arrive to check in on 1st floor near [facility] entrance two hours prior to your procedure. Pre-procedure Labs/Imaging:    o PRE-PROCEDURE LABS NEEDED: YES   o Forms for labwork may be given at appointment. If not, they will be mailed to you. Labs should be completed within 5-7 days of procedure. These can be done at any [facility] or [employer] lab (one is located in [address] [place] [address]. No appointment needed). NO A COVID swab may be needed 4 days prior to your procedure. o YOU MAY NEED PRE-PROCEDURE IMAGING, CHEST CTA OR CARDIAC MRI.       []  Chest CTA     []  Cardiac MRI    ([facility] scheduling to call you)  -  [phone]          Medication Instructions:     Do not stop the following blood thinner prior to procedure: Eliquis         If you take any of the following Diabetic medications, please use as follows:    []  Glucophage/Metformin  -  Hold morning dose on day of procedure.    []  Insulin  -  Hold morning dose on day of procedure.    []  Lantus  -  Reduce dose by ½ evening before procedure. Nothing to eat or drink after midnight before your procedure. You may take all other medications as directed the morning of your procedure with a sip of water unless otherwise indicated.         Please contact the office [phone] and ask for a member of Dr. Gray Edge' team for procedure questions. There is a physician on call for the office after hours for immediate needs. FOLLOW UP:   Your appointments will be made for you post procedure based off of discharge instructions. You may have driving restrictions for a short time after your procedure (usually 2-3 days). Pacemaker/ICD patients will be unable to have an MRI until 6 weeks after implant, NO dental work for 8 weeks after your implant. Pacemaker  Discharge Instructions    Please make sure you have received your Temporary Pacemaker identification card with your discharge instructions      MEDICATIONS         Take only the medications prescribed to you at discharge.  You are prescribed an antibiotic to take for 5-7 days. Please do not miss doses of this prescription. ACTIVITY         Return to your normal activity, except as noted below. o Do not lift anything heavier than 10 pounds for 4 weeks with the affected arm. This is how long it takes the muscles to heal, and the leads inside your heart to stabilize their position. o Do not reach above your head with the affected arm for 4 weeks, doing so increases the risk of lead dislodgement.    o It is, however, important to move the affected arm to prevent shoulder stiffness and locking. o Avoid tight clothes or unnecessary pressure over your incision (such as bra straps or seat belts). If it is tender or sensitive to clothing, cover the incision with a soft dressing or pad.  o Avoid driving for at least 72 hours.   o You may resume all other activities after 4 weeks (including exercise, driving, sex)      SHOWERING         Leave the bandage over your incision until your clinic follow up in 10-14 days after the Pacemaker implant. You bandage will be removed in clinic during that appointment.  It is important to keep the bandaged area clean and dry.   You may shower around the site until the bandage is removed in clinic. Thereafter, you may shower after the bandage is removed, washing it gently with soap and water. Do not apply any lotions, powders, or perfumes to the incision line.  Avoid submerging your incision in water (tub baths, hot tubs, or swimming) for four weeks.  Underneath the dressing.  o You will most likely have a Zipline dressing over the incision. This is made with plastic zip ties to hold the incision together and promote better healing. You may note dried blood around this dressing which is normal. Do not attempt to pull this off.   o If you have white steri-strips over your incision (underneath the gauze dressing), they will curl up at the end and fall off, usually within 10 days. Do not pull them off.  - OR -   o You may have a different type of closure for the incision including a dermabond adhesive which will slowly peel and come off on its own once you are able to shower. DISCHARGE PRECAUTIONS         Record your temperature every day, at the same time, until your 10-14 day follow up. A temperature of 100.5 F, or higher, can be the first sign of infection. This should be reported to your Doctor immediately.  You can have an MRI after 6 weeks. You must be aware that any strong magnet or magnetic field can affect your Pacemaker. In general, be careful of metal detectors, heavy machinery, and any area where arc-welding is performed. When approaching a security checkpoint show your Pacemaker ID Card to security personnel.  Always tell your doctor or dentist that you have a Pacemaker. In some cases, antibiotics may be prescribed before certain procedures.  Your temporary identification will be given to you with these instructions. Keep your Pacemaker card in your wallet or on your person at all times. You should receive your permanent card, although this may take up to 8-12 weeks.   If you do not receive your permanent card, please call the office at ) [phone] or the phone number provided on your temporary card for the pacemaker company. TAKING YOUR PULSE         Take your pulse the same time every day, preferably in the morning, until your follow up.  Sit down and rest for 5 minutes prior to taking your pulse.  Take your pulse for 1 full minute, use a clock or stop watch with a second hand.  To feel your pulse, use the first two fingers of one hand; place them on the thumb side of the wrist of the opposite hand. The pulse will be steady, regular and throbbing.  Call the physician if your pulse is less than 40 beats per minute. SYMPTOMS THAT NEED TO BE REPORTED IMMEDIATELY         Temperature more than 100.4 F     Redness or warmth at the incision site, or pain for longer than the first 5 days after the implant.  Drainage from the incision site.  Swelling around the incision site.  Shortness of breath.  Rapid heart rate or palpitations.  Dizziness, lightheadedness, fainting.  Slow pulse below 40 beats per minute.  REMEMBER: If you feel something is an emergency or cannot be handled over the phone, call 911 or go to the closest emergency room. Yoni Lee MD  Cardiac Electrophysiology / Cardiology    [address] [place] [address], [address] [place] [address]  [phone] / [phone] Fax            [phone] / [phone] Fax          Restrictions for Pacemakers and ICDs   Follow up will be scheduled for 10-14 days post implant with our device clinic and Nay Rodriguez NP and then 3 months post implant with Dr. Ally Rebolledo. This is subject to change based off of discharge orders placed by MD or NP.    Restrictions:    NO raising affected arm above shoulder height until: 1 month (or 4 weeks) post implant   NO lifting (with affected arm) heavier than 10 pounds until:  1 month (or 4 weeks) post implant, slowly work back into regular activity after   NO fast, swinging motions (raking, golfing/swimming/power walking) until:  1 month (or 4 weeks) post implant, slowly work back into regular activity after   NO arc welding or chainsaw use: ICD: NOT allowed  Pacemaker: 1 month (or 4 weeks) post implant. Refer to patient number on device card to reference certain equipment. NO soaking in water (bathtub, hot tub, pool, river, ocean, etc.): 1 month (or 4 weeks) post implant or until completely healed   Allowed to shower: Ok to get bandage wet, OK to shower after bandage removed. Do not let shower beat on incision site. Pat dry. NO dental work for 8 weeks after your implant. (antibiotics only needed with certain procedures)   MRI compatible devices AND leads:  Must wait until 6 weeks after implant. DRIVING: No driving for 3 days, or longer depending on MD's recommendations. You must wear seatbelt per [place]. If this is uncomfortable, use a pad or towel over the site or avoid driving until discomfort lessens. **Remote monitoring is STRONGLY recommended for our device clinic and instruction will be given based off of your device and device company.

## 2021-07-14 NOTE — PROGRESS NOTES
Room #: 1    C/o intermittent palpitations. Visit Vitals  /74 (BP 1 Location: Left upper arm, BP Patient Position: Sitting, BP Cuff Size: Adult)   Pulse 74   Resp 18   Ht 5' 2\" (1.575 m)   Wt 140 lb 9.6 oz (63.8 kg)   SpO2 99%   BMI 25.72 kg/m²         Chest pain:  NO  Shortness of breath:  NO  Edema: YES  Palpitations, skipped beats, rapid heartbeat:  YES  Dizziness:  NO    1. Have you been to the ER, urgent care clinic since your last visit? Hospitalized since your last visit? No    2. Have you seen or consulted any other health care providers outside of the [address] [place] [address] since your last visit? Include any pap smears or colon screening.  No      Refills:  NO

## 2021-07-20 ENCOUNTER — PREP FOR PROCEDURE (OUTPATIENT)
Dept: CARDIOLOGY CLINIC | Age: 86
End: 2021-07-20

## 2021-07-20 RX ORDER — SODIUM CHLORIDE 0.9 % (FLUSH) 0.9 %
5-40 SYRINGE (ML) INJECTION AS NEEDED
Status: CANCELLED | OUTPATIENT
Start: 2021-07-20

## 2021-07-20 RX ORDER — SODIUM CHLORIDE 0.9 % (FLUSH) 0.9 %
5-40 SYRINGE (ML) INJECTION EVERY 8 HOURS
Status: CANCELLED | OUTPATIENT
Start: 2021-07-20

## 2021-07-22 ENCOUNTER — DOCUMENTATION ONLY (OUTPATIENT)
Dept: CARDIOLOGY CLINIC | Age: 86
End: 2021-07-22

## 2021-07-22 NOTE — PROGRESS NOTES
Received faxed request for records from Dr. Bari Cobos for Dr. Asia Olmedo' progress notes from 7/14/21. Faxed progress notes and event monitor results to [phone]. Confirmation received.

## 2021-07-23 ENCOUNTER — TELEPHONE (OUTPATIENT)
Dept: CARDIOLOGY CLINIC | Age: 86
End: 2021-07-23

## 2021-07-23 RX ORDER — CEPHALEXIN 500 MG/1
500 CAPSULE ORAL 3 TIMES DAILY
Status: ON HOLD | COMMUNITY
End: 2021-08-20

## 2021-07-23 NOTE — TELEPHONE ENCOUNTER
Received call from patient, ID verified using two patient identifiers. Patient calling to verify that we received her lab work from her PCP and to let us know she is being treated for a UTI. She also wants to verify the time she needs to come in for her procedure on Monday. Advised patient that she should arrive at 7:00 am and that we did receive her labwork and that she is should continue to take her prescribed antibiotic. Patient states she is currently taking Cephalexin 500 mg TID. Reviewed pre-procedure instructions with patient and time allowed for questions. Patient verbalized understanding and will call with any other questions.

## 2021-07-26 ENCOUNTER — HOSPITAL ENCOUNTER (OUTPATIENT)
Age: 86
Setting detail: OUTPATIENT SURGERY
Discharge: HOME | End: 2021-07-26
Attending: INTERNAL MEDICINE | Admitting: INTERNAL MEDICINE

## 2021-07-26 VITALS
RESPIRATION RATE: 18 BRPM | HEIGHT: 62 IN | TEMPERATURE: 97.4 F | OXYGEN SATURATION: 99 % | BODY MASS INDEX: 25.73 KG/M2 | DIASTOLIC BLOOD PRESSURE: 95 MMHG | WEIGHT: 139.8 LBS | HEART RATE: 97 BPM | SYSTOLIC BLOOD PRESSURE: 152 MMHG

## 2021-07-26 DIAGNOSIS — R00.1 SEVERE SINUS BRADYCARDIA: ICD-10-CM

## 2021-07-26 RX ORDER — GENTAMICIN SULFATE 80 MG/100ML
80 INJECTION, SOLUTION INTRAVENOUS ONCE
Status: DISCONTINUED | OUTPATIENT
Start: 2021-07-26 | End: 2021-07-26

## 2021-07-26 RX ORDER — SODIUM CHLORIDE 0.9 % (FLUSH) 0.9 %
5-40 SYRINGE (ML) INJECTION EVERY 8 HOURS
Status: DISCONTINUED | OUTPATIENT
Start: 2021-07-26 | End: 2021-07-26 | Stop reason: HOSPADM

## 2021-07-26 RX ORDER — SODIUM CHLORIDE 0.9 % (FLUSH) 0.9 %
5-40 SYRINGE (ML) INJECTION AS NEEDED
Status: DISCONTINUED | OUTPATIENT
Start: 2021-07-26 | End: 2021-07-26 | Stop reason: HOSPADM

## 2021-07-26 NOTE — ROUTINE PROCESS
7:30 AM  Patient arrived. ID and allergies verified verbally with patient. Pt voices understanding of procedure to be performed. Consent obtained. Pt prepped for procedure. 8:44 AM  Pt to be rescheduled at later time for pacemaker. She is still on antibiotic for UTI.

## 2021-07-30 ENCOUNTER — TELEPHONE (OUTPATIENT)
Dept: CARDIOLOGY CLINIC | Age: 86
End: 2021-07-30

## 2021-07-30 DIAGNOSIS — I48.0 PAROXYSMAL ATRIAL FIBRILLATION (HCC): Primary | ICD-10-CM

## 2021-07-30 DIAGNOSIS — Z01.812 PRE-PROCEDURE LAB EXAM: ICD-10-CM

## 2021-07-30 NOTE — LETTER
8/3/2021 1:43 PM    Ms. Han Reich  [address] [facility] [address]      You are scheduled for the following procedure with Dr. Dawson Talley:  Asher Mins Pacemaker at [facility], [address] [facility], [place], [address] [place] [address] [place]      PLEASE be aware that your procedure date/time is tentative and subject to change due to emergency cases. Procedure date/time:    August 20, 2021  Time: 11:00 am - please arrive by 9:30  am      ARRIVAL time:  (You will need  to be discharged home with.)     o [facility] procedures: please arrive to check in on 2nd floor two hours prior to your procedure the day of your procedure.    o [facility] procedures: arrive to check in on 1st floor near [facility] two hours prior to your procedure. Pre-procedure Labs/Imaging:    o PRE-PROCEDURE LABS NEEDED: YES   o Forms for labwork may be given at appointment. If not, they will be mailed to you. Labs should be completed within 5-7 days of procedure. These can be done at any [employer] [facility] lab (one is located in [address] [place] [address]. No appointment needed). NO A COVID swab may be needed 4 days prior to your procedure. o YOU MAY NEED PRE-PROCEDURE IMAGING, CHEST CTA OR CARDIAC MRI.    []  Chest CTA     []  Cardiac MRI    ([facility] scheduling to call you)  -  [phone]          Medication Instructions:     Do not stop the following blood thinner prior to procedure: Eliquis         If you take any of the following Diabetic medications, please use as follows:    []  Glucophage/Metformin  -  Hold morning dose on day of procedure.    []  Insulin  -  Hold morning dose on day of procedure.    []  Lantus  -  Reduce dose by ½ evening before procedure. Nothing to eat or drink after midnight before your procedure. You may take all other medications as directed the morning of your procedure with a sip of water unless otherwise indicated.         Please contact the office [phone] and ask for a member of Dr. Danielle Tovar' team for procedure questions. There is a physician on call for the office after hours for immediate needs. FOLLOW UP:   Your appointments will be made for you post procedure based off of discharge instructions. You may have driving restrictions for a short time after your procedure (usually 2-3 days). Pacemaker/ICD patients will be unable to have an MRI until 6 weeks after implant, NO dental work for 8 weeks after your implant. Sincerely,      An Russo MD   Pacemaker  Discharge Instructions    Please make sure you have received your Temporary Pacemaker identification card with your discharge instructions      MEDICATIONS         Take only the medications prescribed to you at discharge.  You are prescribed an antibiotic to take for 5-7 days. Please do not miss doses of this prescription. ACTIVITY         Return to your normal activity, except as noted below. o Do not lift anything heavier than 10 pounds for 4 weeks with the affected arm. This is how long it takes the muscles to heal, and the leads inside your heart to stabilize their position. o Do not reach above your head with the affected arm for 4 weeks, doing so increases the risk of lead dislodgement.    o It is, however, important to move the affected arm to prevent shoulder stiffness and locking. o Avoid tight clothes or unnecessary pressure over your incision (such as bra straps or seat belts). If it is tender or sensitive to clothing, cover the incision with a soft dressing or pad.  o Avoid driving for at least 72 hours.   o You may resume all other activities after 4 weeks (including exercise, driving, sex)      SHOWERING         Leave the bandage over your incision until your clinic follow up in 10-14 days after the Pacemaker implant. You bandage will be removed in clinic during that appointment.  It is important to keep the bandaged area clean and dry.   You may shower around the site until the bandage is removed in clinic. Thereafter, you may shower after the bandage is removed, washing it gently with soap and water. Do not apply any lotions, powders, or perfumes to the incision line.  Avoid submerging your incision in water (tub baths, hot tubs, or swimming) for four weeks.  Underneath the dressing.  o You will most likely have a Zipline dressing over the incision. This is made with plastic zip ties to hold the incision together and promote better healing. You may note dried blood around this dressing which is normal. Do not attempt to pull this off.   o If you have white steri-strips over your incision (underneath the gauze dressing), they will curl up at the end and fall off, usually within 10 days. Do not pull them off.  - OR -   o You may have a different type of closure for the incision including a dermabond adhesive which will slowly peel and come off on its own once you are able to shower. DISCHARGE PRECAUTIONS         Record your temperature every day, at the same time, until your 10-14 day follow up. A temperature of 100.5 F, or higher, can be the first sign of infection. This should be reported to your Doctor immediately.  You can have an MRI after 6 weeks. You must be aware that any strong magnet or magnetic field can affect your Pacemaker. In general, be careful of metal detectors, heavy machinery, and any area where arc-welding is performed. When approaching a security checkpoint show your Pacemaker ID Card to security personnel.  Always tell your doctor or dentist that you have a Pacemaker. In some cases, antibiotics may be prescribed before certain procedures.  Your temporary identification will be given to you with these instructions. Keep your Pacemaker card in your wallet or on your person at all times. You should receive your permanent card, although this may take up to 8-12 weeks.   If you do not receive your permanent card, please call the office at [phone] or the phone number provided on your temporary card for the pacemaker company. TAKING YOUR PULSE         Take your pulse the same time every day, preferably in the morning, until your follow up.  Sit down and rest for 5 minutes prior to taking your pulse.  Take your pulse for 1 full minute, use a clock or stop watch with a second hand.  To feel your pulse, use the first two fingers of one hand; place them on the thumb side of the wrist of the opposite hand. The pulse will be steady, regular and throbbing.  Call the physician if your pulse is less than 40 beats per minute. SYMPTOMS THAT NEED TO BE REPORTED IMMEDIATELY         Temperature more than 100.4 F     Redness or warmth at the incision site, or pain for longer than the first 5 days after the implant.  Drainage from the incision site.  Swelling around the incision site.  Shortness of breath.  Rapid heart rate or palpitations.  Dizziness, lightheadedness, fainting.  Slow pulse below 40 beats per minute.  REMEMBER: If you feel something is an emergency or cannot be handled over the phone, call 911 or go to the closest emergency room. Krystal Gotti MD  Cardiac Electrophysiology / Cardiology    [address] [place] [address], [address], Suite [facility] [address], [address] [facility]  [phone] Fax   [phone] Fax                                                                    Restrictions for Pacemakers and ICDs   Follow up will be scheduled for 10-14 days post implant with our device clinic and Aurora Jimenez NP and then 3 months post implant with Dr. Manjeet Clemens. This is subject to change based off of discharge orders placed by MD or NP.    Restrictions:    NO raising affected arm above shoulder height until: 1 month (or 4 weeks) post implant   NO lifting (with affected arm) heavier than 10 pounds until:  1 month (or 4 weeks) post implant, slowly work back into regular activity after   NO fast, swinging motions (raking, golfing/swimming/power walking) until:  1 month (or 4 weeks) post implant, slowly work back into regular activity after   NO arc welding or chainsaw use: ICD: NOT allowed  Pacemaker: 1 month (or 4 weeks) post implant. Refer to patient number on device card to reference certain equipment. NO soaking in water (bathtub, hot tub, pool, river, ocean, etc.): 1 month (or 4 weeks) post implant or until completely healed   Allowed to shower: Ok to get bandage wet, OK to shower after bandage removed. Do not let shower beat on incision site. Pat dry. NO dental work for 8 weeks after your implant. (antibiotics only needed with certain procedures)   MRI compatible devices AND leads:  Must wait until 6 weeks after implant. DRIVING: No driving for 3 days, or longer depending on MD's recommendations. You must wear seatbelt per [place]. If this is uncomfortable, use a pad or towel over the site or avoid driving until discomfort lessens. **Remote monitoring is STRONGLY recommended for our device clinic and instruction will be given based off of your device and device company.

## 2021-07-30 NOTE — TELEPHONE ENCOUNTER
Patient called back regarding previous message and whether she would be rescheduled for the procedure.       [facility]:[phone]

## 2021-08-03 NOTE — TELEPHONE ENCOUNTER
Received call from patient, ID verified using two patient identifiers. Patient scheduled for her pacemaker procedure on Friday, 8/20/21 at 11:00 am (will arrive at 9:30 am.)  Reviewed pre-procedure instructions with patient and time allowed for questions. Instructions to be emailed today to the address confirmed on file. Patient verbalized understanding and will call with any other questions.

## 2021-08-03 NOTE — TELEPHONE ENCOUNTER
Returned patient call, ID verified using two patient identifiers. Patient states she has finished her antibiotics for her recent UTI. Available dates provided to patient to reschedule her pacemaker procedure. Patient states she will have to call her family to see what date she will be able to arrange for transportation. She will call back once she knows which day will work.

## 2021-08-10 ENCOUNTER — PREP FOR PROCEDURE (OUTPATIENT)
Dept: CARDIOLOGY CLINIC | Age: 86
End: 2021-08-10

## 2021-08-10 RX ORDER — SODIUM CHLORIDE 0.9 % (FLUSH) 0.9 %
5-40 SYRINGE (ML) INJECTION EVERY 8 HOURS
Status: CANCELLED | OUTPATIENT
Start: 2021-08-10

## 2021-08-10 RX ORDER — SODIUM CHLORIDE 0.9 % (FLUSH) 0.9 %
5-40 SYRINGE (ML) INJECTION AS NEEDED
Status: CANCELLED | OUTPATIENT
Start: 2021-08-10

## 2021-08-11 ENCOUNTER — TELEPHONE (OUTPATIENT)
Dept: CARDIOLOGY CLINIC | Age: 86
End: 2021-08-11

## 2021-08-11 DIAGNOSIS — Z01.812 PRE-PROCEDURE LAB EXAM: ICD-10-CM

## 2021-08-11 DIAGNOSIS — R00.1 SEVERE SINUS BRADYCARDIA: ICD-10-CM

## 2021-08-11 DIAGNOSIS — I48.0 PAROXYSMAL ATRIAL FIBRILLATION (HCC): Primary | ICD-10-CM

## 2021-08-11 DIAGNOSIS — I48.0 PAF (PAROXYSMAL ATRIAL FIBRILLATION) (HCC): ICD-10-CM

## 2021-08-11 NOTE — LETTER
8/11/2021 9:40 AM    Ms. Emery Milling  [address] [facility] [address]        You are scheduled for the following procedure with Dr. Jan Sorenson:  Jerilyn Conchita at [facility], [address] [facility], [address] [facility]      PLEASE be aware that your procedure date/time is tentative and subject to change due to emergency cases. Procedure date/time:    September 20, 2021  Time: 11:00 am - please arrive by 9:00 am      ARRIVAL time:  (You will need  to be discharged home with.)     o [facility] procedures: please arrive to check in on 2nd floor two hours prior to your procedure the day of your procedure.    o [facility] procedures: arrive to check in on 1st floor near [facility] two hours prior to your procedure. Pre-procedure Labs/Imaging:    o PRE-PROCEDURE LABS NEEDED: YES   o Forms for labwork may be given at appointment. If not, they will be mailed to you. Labs should be completed within 5-7 days of procedure. These can be done at any [facility] or [facility] lab (one is located in [address] [place] [address]. No appointment needed). NO A COVID swab may be needed 4 days prior to your procedure. o YOU MAY NEED PRE-PROCEDURE IMAGING, CHEST CTA/CARDIAC MRI.    []  Chest CTA     []  Cardiac MRI    ([facility] scheduling to call you)  -  [phone]        Medication Instructions:     Do not stop the following blood thinner prior to procedure: Aspirin and Eliquis         If you take any of the following Diabetic medications, please use as follows:    []  Glucophage/Metformin  -  Hold morning dose on day of procedure.    []  Insulin  -  Hold morning dose on day of procedure.    []  Lantus  -  Reduce dose by ½ evening before procedure. Nothing to eat or drink after midnight before your procedure. You may take all other medications as directed the morning of your procedure with a sip of water unless otherwise indicated.         Please contact the office [phone] and ask for a member of Dr. Migdalia Chin' team for procedure questions. There is a physician on call for the office after hours for immediate needs. FOLLOW UP:   Your appointments will be made for you post procedure based off of discharge instructions. You may have driving restrictions for a short time after your procedure (usually 2-3 days). Pacemaker/ICD patients will be unable to have an MRI until 6 weeks after implant, NO dental work for 8 weeks after your implant. Sincerely,      Ashley Cid MD   AV Node ablation   Discharge Instructions      You have just had an AV Node Ablation. There were catheters temporarily placed in your heart through a puncture in the Veins and/or Arteries in your groin. WHAT TO EXPECT      If you have had an AV node ablation please be aware that you may experience mild chest pain that will resolve within 24-48 hours. If the Chest Pain begins radiating down your shoulders or arms, becomes severe or breathing becomes difficult, call 911 or go to the closest emergency room.  Mild to moderate, non-painful, bruising or mild swelling at the puncture site is not un-common, and will resolve in 7  14 days, and may extend down your thigh as it heals. Application of Ice to the site may help with any tenderness.  If a closure device was used to seal your artery or vein, a separate pamphlet will be given to you with these discharge instructions. It is very important that you review the information in the pamphlet for different restrictions and precautions.  You have a small gauze dressing applied to the puncture site in your groin. You may remove this the following morning.  It is not uncommon to notice or feel your heart beating faster than normal. After an AV node ablation, you can expect your heart rate to be 90bpm or higher until your first device follow up.  This reflects the pacemaker settings immediately post procedure and will be adjusted at your two week follow up. MEDICATIONS      Take only the medications prescribed to you at discharge. ACTIVITY      A responsible adult must take you home. Do not drive a car for 24 hours.  Rest quietly for the remainder of the day.  Do not lift anything greater than 10 pounds for 3 days.  Limit bending at the puncture site and use of stairs for at least 3 days.  You may remove the bandage and shower the morning after the procedure. Do not take a bath for 3 days. SYMPTOMS THAT NEED TO BE REPORTED IMMEDIATELY      Bleeding at the puncture site. If there is bleeding, lie down and hold firm direct pressure for at least 5 minutes. If the bleeding does not stop, go to the closest emergency room, or call 911.  Temperature more than 100.5 F.   Redness or warmth at the puncture site.  Increasing pain, numbness, coolness or blue discoloration of the extremity where the puncture is located.  Pulsating mass at the puncture site.  A new lump at the puncture site or increasing swelling at the site. Please be aware that a pea or marble sized lump is normal, anything larger or increasing in size should be reported.  Bruising at the puncture site that enlarges or becomes painful (some bruising at the site is common and will go away in 7-14 days).  Rapid heart rate or palpitations.  Dizziness, lightheadedness, fainting.  REMEMBER: If you feel something is an emergency or cannot be handled over the phone, call 911 or go to the closest emergency room.       FOLLOW UP CARE     Hung Patrick NP in 2 weeks  Dr. Hai Skinner in 3 months        Sandeep Castañeda MD  Cardiac Electrophysiology / Cardiology    [address]  [address] [facility], [address] [place] Street[facility]aron[facility]  [phone] / [phone] Fax  [phone] / [phone] Fax

## 2021-08-12 ENCOUNTER — PREP FOR PROCEDURE (OUTPATIENT)
Dept: CARDIOLOGY CLINIC | Age: 86
End: 2021-08-12

## 2021-08-12 RX ORDER — SODIUM CHLORIDE 0.9 % (FLUSH) 0.9 %
5-40 SYRINGE (ML) INJECTION AS NEEDED
Status: CANCELLED | OUTPATIENT
Start: 2021-08-12

## 2021-08-12 RX ORDER — SODIUM CHLORIDE 0.9 % (FLUSH) 0.9 %
5-40 SYRINGE (ML) INJECTION EVERY 8 HOURS
Status: CANCELLED | OUTPATIENT
Start: 2021-08-12

## 2021-08-20 ENCOUNTER — APPOINTMENT (OUTPATIENT)
Dept: GENERAL RADIOLOGY | Age: 86
End: 2021-08-20
Attending: INTERNAL MEDICINE
Payer: MEDICARE

## 2021-08-20 ENCOUNTER — HOSPITAL ENCOUNTER (OUTPATIENT)
Age: 86
Setting detail: OUTPATIENT SURGERY
Discharge: HOME | End: 2021-08-20
Attending: INTERNAL MEDICINE | Admitting: INTERNAL MEDICINE
Payer: MEDICARE

## 2021-08-20 VITALS
HEART RATE: 105 BPM | WEIGHT: 139.88 LBS | HEIGHT: 62 IN | RESPIRATION RATE: 19 BRPM | DIASTOLIC BLOOD PRESSURE: 82 MMHG | BODY MASS INDEX: 25.74 KG/M2 | OXYGEN SATURATION: 97 % | TEMPERATURE: 97.9 F | SYSTOLIC BLOOD PRESSURE: 107 MMHG

## 2021-08-20 DIAGNOSIS — R00.1 SEVERE SINUS BRADYCARDIA: ICD-10-CM

## 2021-08-20 LAB
ANION GAP SERPL CALC-SCNC: 5 MMOL/L (ref 5–15)
BUN SERPL-MCNC: 14 MG/DL (ref 6–20)
BUN/CREAT SERPL: 14 (ref 12–20)
CALCIUM SERPL-MCNC: 8.9 MG/DL (ref 8.5–10.1)
CHLORIDE SERPL-SCNC: 108 MMOL/L (ref 97–108)
CO2 SERPL-SCNC: 25 MMOL/L (ref 21–32)
CREAT SERPL-MCNC: 0.98 MG/DL (ref 0.55–1.02)
ERYTHROCYTE [DISTWIDTH] IN BLOOD BY AUTOMATED COUNT: 16 % (ref 11.5–14.5)
GLUCOSE SERPL-MCNC: 89 MG/DL (ref 65–100)
HCT VFR BLD AUTO: 42.6 % (ref 35–47)
HGB BLD-MCNC: 12.5 G/DL (ref 11.5–16)
MCH RBC QN AUTO: 26.7 PG (ref 26–34)
MCHC RBC AUTO-ENTMCNC: 29.3 G/DL (ref 30–36.5)
MCV RBC AUTO: 90.8 FL (ref 80–99)
NRBC # BLD: 0 K/UL (ref 0–0.01)
NRBC BLD-RTO: 0 PER 100 WBC
PLATELET # BLD AUTO: 143 K/UL (ref 150–400)
PMV BLD AUTO: 11.8 FL (ref 8.9–12.9)
POTASSIUM SERPL-SCNC: 4.1 MMOL/L (ref 3.5–5.1)
RBC # BLD AUTO: 4.69 M/UL (ref 3.8–5.2)
SODIUM SERPL-SCNC: 138 MMOL/L (ref 136–145)
WBC # BLD AUTO: 8 K/UL (ref 3.6–11)

## 2021-08-20 PROCEDURE — A4565 SLINGS: HCPCS | Performed by: INTERNAL MEDICINE

## 2021-08-20 PROCEDURE — C1892 INTRO/SHEATH,FIXED,PEEL-AWAY: HCPCS | Performed by: INTERNAL MEDICINE

## 2021-08-20 PROCEDURE — C1898 LEAD, PMKR, OTHER THAN TRANS: HCPCS | Performed by: INTERNAL MEDICINE

## 2021-08-20 PROCEDURE — 74011000636 HC RX REV CODE- 636: Performed by: INTERNAL MEDICINE

## 2021-08-20 PROCEDURE — 99152 MOD SED SAME PHYS/QHP 5/>YRS: CPT | Performed by: INTERNAL MEDICINE

## 2021-08-20 PROCEDURE — 71045 X-RAY EXAM CHEST 1 VIEW: CPT

## 2021-08-20 PROCEDURE — 33207 INSERT HEART PM VENTRICULAR: CPT | Performed by: INTERNAL MEDICINE

## 2021-08-20 PROCEDURE — 80048 BASIC METABOLIC PNL TOTAL CA: CPT

## 2021-08-20 PROCEDURE — 77030035236 HC SUT PDS STRATFX BARB J&J -B: Performed by: INTERNAL MEDICINE

## 2021-08-20 PROCEDURE — 77030018729 HC ELECTRD DEFIB PAD CARD -B: Performed by: INTERNAL MEDICINE

## 2021-08-20 PROCEDURE — 77030018547 HC SUT ETHBND1 J&J -B: Performed by: INTERNAL MEDICINE

## 2021-08-20 PROCEDURE — 74011000250 HC RX REV CODE- 250: Performed by: INTERNAL MEDICINE

## 2021-08-20 PROCEDURE — 77030041279 HC DRSG PRMSL AG MDII -B: Performed by: INTERNAL MEDICINE

## 2021-08-20 PROCEDURE — 99153 MOD SED SAME PHYS/QHP EA: CPT | Performed by: INTERNAL MEDICINE

## 2021-08-20 PROCEDURE — 77030033138 HC SUT PGA STRATFX J&J -B: Performed by: INTERNAL MEDICINE

## 2021-08-20 PROCEDURE — 36415 COLL VENOUS BLD VENIPUNCTURE: CPT

## 2021-08-20 PROCEDURE — 77030037713 HC CLOSR DEV INCIS ZIP STRY -B: Performed by: INTERNAL MEDICINE

## 2021-08-20 PROCEDURE — 85027 COMPLETE CBC AUTOMATED: CPT

## 2021-08-20 PROCEDURE — 2709999900 HC NON-CHARGEABLE SUPPLY: Performed by: INTERNAL MEDICINE

## 2021-08-20 PROCEDURE — C1786 PMKR, SINGLE, RATE-RESP: HCPCS | Performed by: INTERNAL MEDICINE

## 2021-08-20 PROCEDURE — 74011250636 HC RX REV CODE- 250/636: Performed by: INTERNAL MEDICINE

## 2021-08-20 DEVICE — LEAD 5076-58 MRI US RCMCRD
Type: IMPLANTABLE DEVICE | Status: FUNCTIONAL
Brand: CAPSUREFIX NOVUS MRI™ SURESCAN®

## 2021-08-20 DEVICE — PACEMAKER CRD UPLR/BPLR 50.8X42.6X7.4 MM 12.25 CC 22.5 GM: Type: IMPLANTABLE DEVICE | Status: FUNCTIONAL

## 2021-08-20 RX ORDER — CEPHALEXIN 500 MG/1
500 CAPSULE ORAL 3 TIMES DAILY
Qty: 15 CAPSULE | Refills: 0 | Status: SHIPPED | OUTPATIENT
Start: 2021-08-20 | End: 2021-08-25

## 2021-08-20 RX ORDER — DIPHENHYDRAMINE HYDROCHLORIDE 50 MG/ML
25-50 INJECTION, SOLUTION INTRAMUSCULAR; INTRAVENOUS ONCE
Status: COMPLETED | OUTPATIENT
Start: 2021-08-20 | End: 2021-08-20

## 2021-08-20 RX ORDER — HYDROCODONE BITARTRATE AND ACETAMINOPHEN 5; 325 MG/1; MG/1
1 TABLET ORAL
Status: DISCONTINUED | OUTPATIENT
Start: 2021-08-20 | End: 2021-08-20 | Stop reason: HOSPADM

## 2021-08-20 RX ORDER — BUPIVACAINE HYDROCHLORIDE 5 MG/ML
10 INJECTION, SOLUTION EPIDURAL; INTRACAUDAL ONCE
Status: COMPLETED | OUTPATIENT
Start: 2021-08-20 | End: 2021-08-20

## 2021-08-20 RX ORDER — SODIUM CHLORIDE 0.9 % (FLUSH) 0.9 %
5-40 SYRINGE (ML) INJECTION AS NEEDED
Status: DISCONTINUED | OUTPATIENT
Start: 2021-08-20 | End: 2021-08-20 | Stop reason: HOSPADM

## 2021-08-20 RX ORDER — GENTAMICIN SULFATE 80 MG/100ML
80 INJECTION, SOLUTION INTRAVENOUS ONCE
Status: COMPLETED | OUTPATIENT
Start: 2021-08-20 | End: 2021-08-20

## 2021-08-20 RX ORDER — CEFAZOLIN SODIUM 1 G/3ML
2 INJECTION, POWDER, FOR SOLUTION INTRAMUSCULAR; INTRAVENOUS ONCE
Status: DISCONTINUED | OUTPATIENT
Start: 2021-08-20 | End: 2021-08-20

## 2021-08-20 RX ORDER — MIDAZOLAM HYDROCHLORIDE 1 MG/ML
.5-1 INJECTION, SOLUTION INTRAMUSCULAR; INTRAVENOUS
Status: DISCONTINUED | OUTPATIENT
Start: 2021-08-20 | End: 2021-08-20 | Stop reason: HOSPADM

## 2021-08-20 RX ORDER — SODIUM CHLORIDE 0.9 % (FLUSH) 0.9 %
5-40 SYRINGE (ML) INJECTION EVERY 8 HOURS
Status: DISCONTINUED | OUTPATIENT
Start: 2021-08-20 | End: 2021-08-20 | Stop reason: HOSPADM

## 2021-08-20 RX ORDER — FENTANYL CITRATE 50 UG/ML
25-200 INJECTION, SOLUTION INTRAMUSCULAR; INTRAVENOUS
Status: DISCONTINUED | OUTPATIENT
Start: 2021-08-20 | End: 2021-08-20 | Stop reason: HOSPADM

## 2021-08-20 RX ORDER — ONDANSETRON 2 MG/ML
4 INJECTION INTRAMUSCULAR; INTRAVENOUS
Status: DISCONTINUED | OUTPATIENT
Start: 2021-08-20 | End: 2021-08-20 | Stop reason: HOSPADM

## 2021-08-20 RX ORDER — ACETAMINOPHEN 325 MG/1
650 TABLET ORAL
Status: DISCONTINUED | OUTPATIENT
Start: 2021-08-20 | End: 2021-08-20 | Stop reason: HOSPADM

## 2021-08-20 RX ORDER — HEPARIN SODIUM 200 [USP'U]/100ML
500 INJECTION, SOLUTION INTRAVENOUS ONCE
Status: COMPLETED | OUTPATIENT
Start: 2021-08-20 | End: 2021-08-20

## 2021-08-20 RX ORDER — LIDOCAINE HYDROCHLORIDE AND EPINEPHRINE 10; 10 MG/ML; UG/ML
10 INJECTION, SOLUTION INFILTRATION; PERINEURAL
Status: DISCONTINUED | OUTPATIENT
Start: 2021-08-20 | End: 2021-08-20 | Stop reason: HOSPADM

## 2021-08-20 RX ADMIN — LIDOCAINE HYDROCHLORIDE,EPINEPHRINE BITARTRATE 100 MG: 10; .01 INJECTION, SOLUTION INFILTRATION; PERINEURAL at 12:41

## 2021-08-20 RX ADMIN — WATER 2 G: 1 INJECTION INTRAMUSCULAR; INTRAVENOUS; SUBCUTANEOUS at 12:36

## 2021-08-20 RX ADMIN — FENTANYL CITRATE 50 MCG: 50 INJECTION, SOLUTION INTRAMUSCULAR; INTRAVENOUS at 12:39

## 2021-08-20 RX ADMIN — IOPAMIDOL 15 ML: 755 INJECTION, SOLUTION INTRAVENOUS at 12:41

## 2021-08-20 RX ADMIN — MIDAZOLAM 1 MG: 1 INJECTION INTRAMUSCULAR; INTRAVENOUS at 12:41

## 2021-08-20 RX ADMIN — DIPHENHYDRAMINE HYDROCHLORIDE 50 MG: 50 INJECTION INTRAMUSCULAR; INTRAVENOUS at 12:33

## 2021-08-20 RX ADMIN — HEPARIN SODIUM 1000 UNITS: 200 INJECTION, SOLUTION INTRAVENOUS at 12:36

## 2021-08-20 RX ADMIN — GENTAMICIN SULFATE 80 MG: 80 INJECTION, SOLUTION INTRAVENOUS at 12:42

## 2021-08-20 RX ADMIN — BUPIVACAINE HYDROCHLORIDE 50 MG: 5 INJECTION, SOLUTION EPIDURAL; INTRACAUDAL; PERINEURAL at 12:41

## 2021-08-20 NOTE — H&P
HISTORY OF PRESENTING ILLNESS      Severiano Arnt is a 81 y.o. female referred for persistent AF with RVR despite being on amiodarone with borderline low systolic BP's and occasional bradycardia in the 30's. Amiodarone was discontinued as a result. She has a port in her right chest currently that is not being used. PAST MEDICAL HISTORY     Past Medical History:   Diagnosis Date    Asthma     [facility]     Hypertension     Hypothyroid     Lupus ([facility]     Non Hodgkin's lymphoma ([facility]            PAST SURGICAL HISTORY     No past surgical history on file. ALLERGIES     Allergies   Allergen Reactions    Latex Rash          FAMILY HISTORY     Family History   Problem Relation Age of Onset    Heart Disease Mother     negative for cardiac disease       SOCIAL HISTORY     Social History     Socioeconomic History    Marital status:      Spouse name: Not on file    Number of children: Not on file    Years of education: Not on file    Highest education level: Not on file   Tobacco Use    Smoking status: Never Smoker    Smokeless tobacco: Never Used   Substance and Sexual Activity    Alcohol use: Never    Drug use: Never     Social Determinants of Health     Financial Resource Strain:     Difficulty of Paying Living Expenses:    Food Insecurity:     Worried About Running Out of Food in the Last Year:     Ran Out of Food in the Last Year:    Transportation Needs:     Lack of Transportation (Medical):      Lack of Transportation (Non-Medical):    Physical Activity:     Days of Exercise per Week:     Minutes of Exercise per Session:    Stress:     Feeling of Stress :    Social Connections:     Frequency of Communication with Friends and Family:     Frequency of Social Gatherings with Friends and Family:     Attends [employer] Services:     Active Member of Clubs or Organizations:     Attends Club or Organization Meetings:     Marital Status:          MEDICATIONS     No current facility-administered medications for this encounter. I have reviewed the nurses notes, vitals, problem list, allergy list, medical history, family, social history and medications. REVIEW OF SYMPTOMS      General: Pt denies excessive weight gain or loss. Pt is able to conduct ADL's  HEENT: Denies blurred vision, headaches, hearing loss, epistaxis and difficulty swallowing. Respiratory: Denies cough, congestion, shortness of breath, SOUSA, wheezing or stridor. Cardiovascular: Denies precordial pain, palpitations, edema or PND  Gastrointestinal: Denies poor appetite, indigestion, abdominal pain or blood in stool  Genitourinary: Denies hematuria, dysuria, increased urinary frequency  Musculoskeletal: Denies joint pain or swelling from muscles or joints  Neurologic: Denies tremor, paresthesias, headache, or sensory motor disturbance  Psychiatric: Denies confusion, insomnia, depression  Integumentray: Denies rash, itching or ulcers. Hematologic: Denies easy bruising, bleeding       PHYSICAL EXAMINATION      Vitals: see vitals section  General: Well developed, in no acute distress. HEENT: No jaundice, oral mucosa moist, no oral ulcers  Neck: Supple, no stiffness, no lymphadenopathy, supple  Heart:  irreg irreg, no murmur, gallop or rub, no jugular venous distention  Respiratory: Clear bilaterally x 4, no wheezing or rales  Abdomen:   Soft, non-tender, bowel sounds are active. Extremities:  No edema, normal cap refill, no cyanosis. Musculoskeletal: No clubbing, no deformities  Neuro: A&Ox3, speech clear, gait stable, cooperative, no focal neurologic deficits  Skin: Skin color is normal. No rashes or lesions. Non diaphoretic, moist.  Vascular: 2+ pulses symmetric in all extremities       DIAGNOSTIC DATA      EKG:   There were no vitals taken for this visit.      LABORATORY DATA      Lab Results   Component Value Date/Time    WBC 7.5 03/23/2021 01:41 PM    HGB 12.2 03/23/2021 01:41 PM    HCT 39.2 03/23/2021 01:41 PM    PLATELET 461 78/69/8070 01:41 PM    MCV 85.0 03/23/2021 01:41 PM      Lab Results   Component Value Date/Time    Sodium 138 05/07/2021 09:00 AM    Potassium 4.0 05/07/2021 09:00 AM    Chloride 103 05/07/2021 09:00 AM    CO2 31 05/07/2021 09:00 AM    Anion gap 4 (L) 05/07/2021 09:00 AM    Glucose 92 05/07/2021 09:00 AM    BUN 20 05/07/2021 09:00 AM    Creatinine 1.21 (H) 05/07/2021 09:00 AM    BUN/Creatinine ratio 17 05/07/2021 09:00 AM    GFR est AA 51 (L) 05/07/2021 09:00 AM    GFR est non-AA 42 (L) 05/07/2021 09:00 AM    Calcium 9.1 05/07/2021 09:00 AM    Bilirubin, total 0.4 03/23/2021 01:41 PM    Alk. phosphatase 95 03/23/2021 01:41 PM    Protein, total 7.4 03/23/2021 01:41 PM    Albumin 4.1 03/23/2021 01:41 PM    Globulin 3.3 03/23/2021 01:41 PM    A-G Ratio 1.2 03/23/2021 01:41 PM    ALT (SGPT) 18 03/23/2021 01:41 PM           ASSESSMENT      1. Atrial fibrillation   A. Persistent   B. RVR   C. Failed amiodarone  2. NHL   A. S/p radiation/chemo  3. Bradycardia         PLAN     Plan for single chamber Medtronic pacemaker using conscious sedation followed by AV node ablation with conscious sedation 1 month later.              Xander Tmolinson MD  Cardiac Electrophysiology / Cardiology    [facility]  [address] [facility], [facility], [address] [place] [address], [facility]  [phone] / [phone] Fax   [phone] / [phone] Fax

## 2021-08-20 NOTE — DISCHARGE INSTRUCTIONS
Pacemaker  Discharge Instructions    Please make sure you have received your Temporary Pacemaker identification card with your discharge instructions      MEDICATIONS         Take only the medications prescribed to you at discharge.  You are prescribed an antibiotic to take for 5-7 days. Please do not miss doses of this prescription. ACTIVITY         Return to your normal activity, except as noted below. o Do not lift anything heavier than 10 pounds for 4 weeks with the affected arm. This is how long it takes the muscles to heal, and the leads inside your heart to stabilize their position. o Do not reach above your head with the affected arm for 4 weeks, doing so increases the risk of lead dislodgement.    o It is, however, important to move the affected arm to prevent shoulder stiffness and locking. o Avoid tight clothes or unnecessary pressure over your incision (such as bra straps or seat belts). If it is tender or sensitive to clothing, cover the incision with a soft dressing or pad.  o Avoid driving for at least 72 hours.   o You may resume all other activities after 4 weeks (including exercise, driving, sex)      SHOWERING         Leave the bandage over your incision until your clinic follow up in 10-14 days after the Pacemaker implant. You bandage will be removed in clinic during that appointment.  It is important to keep the bandaged area clean and dry. You may shower around the site until the bandage is removed in clinic. Thereafter, you may shower after the bandage is removed, washing it gently with soap and water. Do not apply any lotions, powders, or perfumes to the incision line.  Avoid submerging your incision in water (tub baths, hot tubs, or swimming) for four weeks.  Underneath the dressing.  o You will most likely have a Zipline dressing over the incision. This is made with plastic zip ties to hold the incision together and promote better healing.  You may note dried blood around this dressing which is normal. Do not attempt to pull this off.   o If you have white steri-strips over your incision (underneath the gauze dressing), they will curl up at the end and fall off, usually within 10 days. Do not pull them off.  - OR -   o You may have a different type of closure for the incision including a dermabond adhesive which will slowly peel and come off on its own once you are able to shower. DISCHARGE PRECAUTIONS         Record your temperature every day, at the same time, until your 10-14 day follow up. A temperature of 100.5 F, or higher, can be the first sign of infection. This should be reported to your Doctor immediately.  You can have an MRI after 6 weeks. You must be aware that any strong magnet or magnetic field can affect your Pacemaker. In general, be careful of metal detectors, heavy machinery, and any area where arc-welding is performed. When approaching a security checkpoint show your Pacemaker ID Card to security personnel.  Always tell your doctor or dentist that you have a Pacemaker. In some cases, antibiotics may be prescribed before certain procedures.  Your temporary identification will be given to you with these instructions. Keep your Pacemaker card in your wallet or on your person at all times. You should receive your permanent card, although this may take up to 8-12 weeks. If you do not receive your permanent card, please call the office at [phone] or the phone number provided on your temporary card for the pacemaker company. TAKING YOUR PULSE         Take your pulse the same time every day, preferably in the morning, until your follow up.  Sit down and rest for 5 minutes prior to taking your pulse.  Take your pulse for 1 full minute, use a clock or stop watch with a second hand.      To feel your pulse, use the first two fingers of one hand; place them on the thumb side of the wrist of the opposite hand.  The pulse will be steady, regular and throbbing.  Call the physician if your pulse is less than 40 beats per minute. SYMPTOMS THAT NEED TO BE REPORTED IMMEDIATELY         Temperature more than 100.4 F     Redness or warmth at the incision site, or pain for longer than the first 5 days after the implant.  Drainage from the incision site.  Swelling around the incision site.  Shortness of breath.  Rapid heart rate or palpitations.  Dizziness, lightheadedness, fainting.  Slow pulse below 40 beats per minute.  REMEMBER: If you feel something is an emergency or cannot be handled over the phone, call 911 or go to the closest emergency room.       Tra Desai MD  Cardiac Electrophysiology / Cardiology    [place] [address].  [address] [facility], [address] [facility], [address]  [address]  [phone] / [phone] Fax       [phone] / [phone] Fax

## 2021-08-20 NOTE — ROUTINE PROCESS
11:20 AM  Patient arrived. ID and allergies verified verbally with patient. Pt voices understanding of procedure to be performed. Consent obtained. Pt prepped for procedure. 12:28 PM  TRANSFER - OUT REPORT:    Verbal report given to Ed, RN(name) on [place]  being transferred to EP lab(unit) for ordered procedure       Report consisted of patients Situation, Background, Assessment and   Recommendations(SBAR). Information from the following report(s) SBAR was reviewed with the receiving nurse. Lines:   Venous Access Device 07/15/19 Upper chest (subclavicular area, right (Active)       Peripheral IV 08/20/21 Anterior; Left Forearm (Active)   Site Assessment Clean, dry, & intact 08/20/21 1154   Phlebitis Assessment 0 08/20/21 1154   Dressing Status Clean, dry, & intact 08/20/21 1154   Dressing Type Tape;Transparent 08/20/21 1154   Hub Color/Line Status Blue 08/20/21 1154        Opportunity for questions and clarification was provided. Patient transported with:   Registered Nurse  Tech    2:01 PM  Discharge instructions reviewed with patient and family. Voiced understanding. Patient given copy of discharge instructions to take home. 2:25 PM  Pt discharged via wheelchair with family. Personal belongings with patient upon discharge.

## 2021-09-02 ENCOUNTER — OFFICE VISIT (OUTPATIENT)
Dept: CARDIOLOGY CLINIC | Age: 86
End: 2021-09-02
Payer: MEDICARE

## 2021-09-02 DIAGNOSIS — Z95.0 CARDIAC PACEMAKER IN SITU: Primary | ICD-10-CM

## 2021-09-02 PROCEDURE — 93279 PRGRMG DEV EVAL PM/LDLS PM: CPT | Performed by: INTERNAL MEDICINE

## 2021-09-02 NOTE — PROGRESS NOTES
c/ 2wk pacer ck/thresholds    Patient presents for wound check post-device implantation. The dressing was removed and the site was inspected. The site appeared to be well-healing with mild ecchymosis (pt takes Eliquis), no tenderness/erythema. Denies pain, fevers, discharge. Future Appointments   Date Time Provider Jhoan Jarvis   9/2/2021  9:00 AM PACEMAKER, STKATHY CAV[facility] BS AMB   12/1/2021 10:00 AM PACEMAKER, STKATHY [facility]AV[facility] BS AMB   12/1/2021 10:20 AM Jovanna Argueta MD CAV[facility] BS AMB   3/29/2022  8:00 AM [place] [address], [facility] CAVSF BS AMB         Continue follow up in device clinic as planned.      See scanned documents

## 2021-09-20 ENCOUNTER — HOSPITAL ENCOUNTER (OUTPATIENT)
Age: 86
Setting detail: OUTPATIENT SURGERY
Discharge: HOME | End: 2021-09-20
Attending: INTERNAL MEDICINE | Admitting: INTERNAL MEDICINE
Payer: MEDICARE

## 2021-09-20 VITALS
DIASTOLIC BLOOD PRESSURE: 68 MMHG | SYSTOLIC BLOOD PRESSURE: 134 MMHG | WEIGHT: 141.98 LBS | TEMPERATURE: 97.1 F | OXYGEN SATURATION: 97 % | BODY MASS INDEX: 26.13 KG/M2 | RESPIRATION RATE: 23 BRPM | HEIGHT: 62 IN | HEART RATE: 106 BPM

## 2021-09-20 DIAGNOSIS — I48.0 PAROXYSMAL ATRIAL FIBRILLATION (HCC): ICD-10-CM

## 2021-09-20 DIAGNOSIS — R00.1 BRADYCARDIA: ICD-10-CM

## 2021-09-20 PROCEDURE — 77030029065 HC DRSG HEMO QCLOT ZMED -B: Performed by: INTERNAL MEDICINE

## 2021-09-20 PROCEDURE — 74011000250 HC RX REV CODE- 250: Performed by: INTERNAL MEDICINE

## 2021-09-20 PROCEDURE — 77030027107 HC PTCH EXT REF CARTO3 J&J -F: Performed by: INTERNAL MEDICINE

## 2021-09-20 PROCEDURE — 93650 ICAR CATH ABLTJ AV NODE FUNC: CPT | Performed by: INTERNAL MEDICINE

## 2021-09-20 PROCEDURE — 2709999900 HC NON-CHARGEABLE SUPPLY: Performed by: INTERNAL MEDICINE

## 2021-09-20 PROCEDURE — C1732 CATH, EP, DIAG/ABL, 3D/VECT: HCPCS | Performed by: INTERNAL MEDICINE

## 2021-09-20 PROCEDURE — 93613 INTRACARDIAC EPHYS 3D MAPG: CPT | Performed by: INTERNAL MEDICINE

## 2021-09-20 PROCEDURE — 93286 PERI-PX EVAL PM/LDLS PM IP: CPT | Performed by: INTERNAL MEDICINE

## 2021-09-20 PROCEDURE — C1893 INTRO/SHEATH, FIXED,NON-PEEL: HCPCS | Performed by: INTERNAL MEDICINE

## 2021-09-20 PROCEDURE — C1760 CLOSURE DEV, VASC: HCPCS | Performed by: INTERNAL MEDICINE

## 2021-09-20 PROCEDURE — 99152 MOD SED SAME PHYS/QHP 5/>YRS: CPT | Performed by: INTERNAL MEDICINE

## 2021-09-20 PROCEDURE — 74011250636 HC RX REV CODE- 250/636: Performed by: INTERNAL MEDICINE

## 2021-09-20 RX ORDER — CEFAZOLIN SODIUM 1 G/3ML
INJECTION, POWDER, FOR SOLUTION INTRAMUSCULAR; INTRAVENOUS AS NEEDED
Status: DISCONTINUED | OUTPATIENT
Start: 2021-09-20 | End: 2021-09-20 | Stop reason: HOSPADM

## 2021-09-20 RX ORDER — HYDROCODONE BITARTRATE AND ACETAMINOPHEN 5; 325 MG/1; MG/1
1 TABLET ORAL
Status: DISCONTINUED | OUTPATIENT
Start: 2021-09-20 | End: 2021-09-20 | Stop reason: HOSPADM

## 2021-09-20 RX ORDER — ONDANSETRON 2 MG/ML
4 INJECTION INTRAMUSCULAR; INTRAVENOUS
Status: DISCONTINUED | OUTPATIENT
Start: 2021-09-20 | End: 2021-09-20 | Stop reason: HOSPADM

## 2021-09-20 RX ORDER — FENTANYL CITRATE 50 UG/ML
INJECTION, SOLUTION INTRAMUSCULAR; INTRAVENOUS AS NEEDED
Status: DISCONTINUED | OUTPATIENT
Start: 2021-09-20 | End: 2021-09-20 | Stop reason: HOSPADM

## 2021-09-20 RX ORDER — SODIUM CHLORIDE 0.9 % (FLUSH) 0.9 %
5-40 SYRINGE (ML) INJECTION EVERY 8 HOURS
Status: DISCONTINUED | OUTPATIENT
Start: 2021-09-20 | End: 2021-09-20 | Stop reason: HOSPADM

## 2021-09-20 RX ORDER — LIDOCAINE HYDROCHLORIDE 10 MG/ML
INJECTION INFILTRATION; PERINEURAL AS NEEDED
Status: DISCONTINUED | OUTPATIENT
Start: 2021-09-20 | End: 2021-09-20 | Stop reason: HOSPADM

## 2021-09-20 RX ORDER — GENTAMICIN SULFATE 80 MG/100ML
80 INJECTION, SOLUTION INTRAVENOUS ONCE
Status: DISCONTINUED | OUTPATIENT
Start: 2021-09-20 | End: 2021-09-20

## 2021-09-20 RX ORDER — HEPARIN SODIUM 200 [USP'U]/100ML
INJECTION, SOLUTION INTRAVENOUS
Status: COMPLETED | OUTPATIENT
Start: 2021-09-20 | End: 2021-09-20

## 2021-09-20 RX ORDER — ACETAMINOPHEN 325 MG/1
650 TABLET ORAL
Status: DISCONTINUED | OUTPATIENT
Start: 2021-09-20 | End: 2021-09-20 | Stop reason: HOSPADM

## 2021-09-20 RX ORDER — SODIUM CHLORIDE 0.9 % (FLUSH) 0.9 %
5-40 SYRINGE (ML) INJECTION AS NEEDED
Status: DISCONTINUED | OUTPATIENT
Start: 2021-09-20 | End: 2021-09-20 | Stop reason: HOSPADM

## 2021-09-20 RX ORDER — MIDAZOLAM HYDROCHLORIDE 1 MG/ML
INJECTION, SOLUTION INTRAMUSCULAR; INTRAVENOUS AS NEEDED
Status: DISCONTINUED | OUTPATIENT
Start: 2021-09-20 | End: 2021-09-20 | Stop reason: HOSPADM

## 2021-09-20 NOTE — DISCHARGE INSTRUCTIONS
Electrophysiology Study (EPS)/Cardiac Ablation   Discharge Instructions      You have just had a Cardiac Ablation for: atrial fibrillation. There were catheters temporarily placed in your heart through a puncture in the Veins and/or Arteries in your groin. WHAT TO EXPECT      If you have had a Cardiac Ablation please be aware that you may experience mild chest pain that will resolve within 24-48 hours. If the Chest Pain begins radiating down your shoulders or arms, becomes severe or breathing becomes difficult, call 911 or go to the closest emergency room.  Mild to moderate, non-painful, bruising or mild swelling at the puncture site is not un-common, and will resolve in 7 - 14 days, and may extend down your thigh as it heals.  If a closure device was used to seal your artery or vein, a separate pamphlet will be given to you with these discharge instructions. It is very important that you review the information in the pamphlet for different restrictions and precautions.  You have a small gauze dressing applied to the puncture site in your groin. You may remove this the following morning.  You MAY receive a 30 day heart monitor in the mail to wear after your procedure. This is to evaluate your heart rhythm post ablation. MEDICATIONS      Take only the medications prescribed to you at discharge. ACTIVITY      A responsible adult must take you home. Do not drive a car for 32-74 hours.  Rest quietly for the remainder of the day.  Do not lift anything greater than 10 pounds for 3 days.  Limit bending at the puncture site and use of stairs for at least 3 days.  You may remove the bandage and shower the morning after the procedure. Do not take a bath for 3 days.  You may resume normal activities after 1 week. SYMPTOMS THAT NEED TO BE REPORTED IMMEDIATELY      Bleeding at the puncture site.   If there is bleeding, lie down and hold firm direct pressure for at least 5 minutes. If the bleeding does not stop, go to the closest emergency room, or call 911.  Temperature more than 100.5 F.   Redness or warmth at the puncture site.  Increasing pain, numbness, coolness or blue discoloration of the extremity where the puncture is located.  Pulsating mass at the puncture site.  A new lump at the puncture site, or increasing swelling at the site. Please be aware that a pea or marble sized lump is normal, anything larger or increasing in size should be reported.  Bruising at the puncture site that enlarges or becomes painful (some bruising at the site is common and will go away in 7-14 days).  Rapid heart rate or palpitations.  Dizziness, lightheadedness, fainting.  REMEMBER: If you feel something is an emergency or cannot be handled over the phone, call 911 or go to the closest emergency room.       FOLLOW UP CARE     Vikas Croft NP in 2 weeks  Dr. Savanna Wing in 3 months        Gerson Romo MD  Cardiac Electrophysiology / Cardiology    [address]  [address] [place] [address], [address]  [phone] / [phone] Fax   [phone] / [phone] Fax

## 2021-09-20 NOTE — ROUTINE PROCESS
9:22 AM  Patient arrived. ID and allergies verified verbally with patient. Pt voices understanding of procedure to be performed. Consent obtained. Pt prepped for procedure. 11:25 AM  TRANSFER - OUT REPORT:    Verbal report given to Jose Eduardo Barrios RN(name) on [place]  being transferred to EP lab(unit) for ordered procedure       Report consisted of patients Situation, Background, Assessment and   Recommendations(SBAR). Information from the following report(s) SBAR was reviewed with the receiving nurse. Lines:   Venous Access Device 07/15/19 Upper chest (subclavicular area, right (Active)       Peripheral IV 09/20/21 Left Antecubital (Active)   Site Assessment Clean, dry, & intact 09/20/21 0942   Phlebitis Assessment 0 09/20/21 0942   Dressing Status Clean, dry, & intact 09/20/21 0942   Dressing Type Tape;Transparent 09/20/21 0942   Hub Color/Line Status Blue 09/20/21 6522        Opportunity for questions and clarification was provided. Patient transported with:   Registered Nurse    12:08 PM  TRANSFER - IN REPORT:    Verbal report received from [facility], RN(name) on [place]  being received from EP lab(unit) for routine post - op      Report consisted of patients Situation, Background, Assessment and   Recommendations(SBAR). Information from the following report(s) Procedure Summary was reviewed with the receiving nurse. Opportunity for questions and clarification was provided. Assessment completed upon patients arrival to unit and care assumed. 1:57 PM  Discharge instructions reviewed with patient and family. Voiced understanding. Patient given copy of discharge instructions to take home. 2:20 PM  Pt discharged via wheelchair with family. Personal belongings with patient upon discharge.

## 2021-10-08 ENCOUNTER — OFFICE VISIT (OUTPATIENT)
Dept: CARDIOLOGY CLINIC | Age: 86
End: 2021-10-08
Payer: MEDICARE

## 2021-10-08 ENCOUNTER — OFFICE VISIT (OUTPATIENT)
Dept: CARDIOLOGY CLINIC | Age: 86
End: 2021-10-08

## 2021-10-08 VITALS
RESPIRATION RATE: 14 BRPM | HEART RATE: 80 BPM | HEIGHT: 62 IN | DIASTOLIC BLOOD PRESSURE: 84 MMHG | WEIGHT: 144.2 LBS | SYSTOLIC BLOOD PRESSURE: 134 MMHG | OXYGEN SATURATION: 98 % | BODY MASS INDEX: 26.54 KG/M2

## 2021-10-08 DIAGNOSIS — I48.0 PAROXYSMAL ATRIAL FIBRILLATION (HCC): Primary | ICD-10-CM

## 2021-10-08 DIAGNOSIS — Z95.0 CARDIAC PACEMAKER IN SITU: Primary | ICD-10-CM

## 2021-10-08 PROCEDURE — G8427 DOCREV CUR MEDS BY ELIG CLIN: HCPCS | Performed by: NURSE PRACTITIONER

## 2021-10-08 PROCEDURE — G8754 DIAS BP LESS 90: HCPCS | Performed by: NURSE PRACTITIONER

## 2021-10-08 PROCEDURE — 99214 OFFICE O/P EST MOD 30 MIN: CPT | Performed by: NURSE PRACTITIONER

## 2021-10-08 PROCEDURE — G8400 PT W/DXA NO RESULTS DOC: HCPCS | Performed by: NURSE PRACTITIONER

## 2021-10-08 PROCEDURE — G8432 DEP SCR NOT DOC, RNG: HCPCS | Performed by: NURSE PRACTITIONER

## 2021-10-08 PROCEDURE — G8752 SYS BP LESS 140: HCPCS | Performed by: NURSE PRACTITIONER

## 2021-10-08 PROCEDURE — 1101F PT FALLS ASSESS-DOCD LE1/YR: CPT | Performed by: NURSE PRACTITIONER

## 2021-10-08 PROCEDURE — G8536 NO DOC ELDER MAL SCRN: HCPCS | Performed by: NURSE PRACTITIONER

## 2021-10-08 PROCEDURE — 93279 PRGRMG DEV EVAL PM/LDLS PM: CPT | Performed by: INTERNAL MEDICINE

## 2021-10-08 PROCEDURE — G8419 CALC BMI OUT NRM PARAM NOF/U: HCPCS | Performed by: NURSE PRACTITIONER

## 2021-10-08 PROCEDURE — 1090F PRES/ABSN URINE INCON ASSESS: CPT | Performed by: NURSE PRACTITIONER

## 2021-10-08 RX ORDER — HYDROXYCHLOROQUINE SULFATE 200 MG/1
200 TABLET, FILM COATED ORAL DAILY
COMMUNITY

## 2021-10-08 NOTE — PROGRESS NOTES
Room EP 5  Visit Vitals  /84 (BP 1 Location: Left upper arm, BP Patient Position: Sitting)   Pulse 80   Resp 14   Ht 5' 2\" (1.575 m)   Wt 144 lb 3.2 oz (65.4 kg)   SpO2 98%   BMI 26.37 kg/m²       Chest pain: no  Shortness of breath: yes, Has Asthma and COPD  Edema: no  Palpitations, Skipped beats, Rapid heartbeat: no  Dizziness: once in awhile  Fatigue:yes but improving    New diagnosis/Surgeries: no    ER/Hospitalizations: no    Refills:no

## 2021-10-08 NOTE — PROGRESS NOTES
chargeable pacer ck post AVN ablation & rate drop fro 90 to 80 ppm    See scanned document for details.

## 2021-10-08 NOTE — LETTER
10/8/2021    Patient: Karina Toledo   YOB: 1935   Date of Visit: 10/8/2021     Lane Young MD  [address] [place] [address]  [facility] [address]  Via Fax: [phone]    Dear Lane Young MD,      Thank you for referring Ms. Karina Toledo to [facility] for evaluation. My notes for this consultation are attached. If you have questions, please do not hesitate to call me. I look forward to following your patient along with you.       Sincerely,    Deidre Zeng NP

## 2021-10-08 NOTE — PROGRESS NOTES
HISTORY OF PRESENTING ILLNESS      Gregg Mead is a 81 y.o. female referred for persistent AF with RVR despite being on amiodarone with borderline low systolic BP's and occasional bradycardia in the 30's. Amiodarone was discontinued as a result. She has a port in her right chest currently that is not being used. She underwent implantation of single chamber pacemaker followed by AV lele ablation. She is doing well without cardiac complaints. PAST MEDICAL HISTORY     Past Medical History:   Diagnosis Date    Asthma     [facility] disease ([facility]     Hypertension     Hypothyroid     Lupus ([facility]     [facility]            PAST SURGICAL HISTORY     No past surgical history on file. ALLERGIES     Allergies   Allergen Reactions    Latex Rash          FAMILY HISTORY     Family History   Problem Relation Age of Onset    Heart Disease Mother     negative for cardiac disease       SOCIAL HISTORY     Social History     Socioeconomic History    Marital status:      Spouse name: Not on file    Number of children: Not on file    Years of education: Not on file    Highest education level: Not on file   Tobacco Use    Smoking status: Never Smoker    Smokeless tobacco: Never Used   Substance and Sexual Activity    Alcohol use: Never    Drug use: Never     Social Determinants of Health     Financial Resource Strain:     Difficulty of Paying Living Expenses:    Food Insecurity:     Worried About Running Out of Food in the Last Year:     Ran Out of Food in the Last Year:    Transportation Needs:     Lack of Transportation (Medical):      Lack of Transportation (Non-Medical):    Physical Activity:     Days of Exercise per Week:     Minutes of Exercise per Session:    Stress:     Feeling of Stress :    Social Connections:     Frequency of Communication with Friends and Family:     Frequency of Social Gatherings with Friends and Family:     Attends Jewish Services:     Active Member of Clubs or Organizations:     Attends Club or Organization Meetings:     Marital Status:          MEDICATIONS     Current Outpatient Medications   Medication Sig    apixaban (ELIQUIS) 5 mg tablet Take 1 Tab by mouth two (2) times a day.  ferrous sulfate 325 mg (65 mg iron) tablet     fluticasone-umeclidinium-vilanterol (Trelegy Ellipta) 100-62.5-25 mcg inhaler     furosemide (LASIX) 40 mg tablet Take 1 Tab by mouth daily. (Patient taking differently: Take 40 mg by mouth daily. 1/2 tablet)    ondansetron (ZOFRAN ODT) 8 mg disintegrating tablet Take 8 mg by mouth every eight (8) hours as needed for Nausea.  albuterol sulfate (PROVENTIL;VENTOLIN) 2.5 mg/0.5 mL nebu nebulizer solution by Nebulization route as needed for Wheezing.  acetaminophen (TYLENOL) 325 mg tablet Take  by mouth every four (4) hours as needed for Pain.  montelukast (SINGULAIR) 10 mg tablet Take 10 mg by mouth daily.  omeprazole (PRILOSEC) 20 mg capsule Take 20 mg by mouth daily.  folic acid (FOLVITE) 1 mg tablet Take 1 mg by mouth daily.  aspirin 81 mg chewable tablet Take 81 mg by mouth nightly.  atorvastatin (LIPITOR) 20 mg tablet Take 20 mg by mouth daily.  levothyroxine (SYNTHROID) 125 mcg tablet Take 125 mcg by mouth Daily (before breakfast).  albuterol (PROVENTIL HFA, VENTOLIN HFA, PROAIR HFA) 90 mcg/actuation inhaler Take 2 Puffs by inhalation every six (6) hours as needed for Wheezing.  alendronate (FOSAMAX) 70 mg tablet Take 70 mg by mouth every seven (7) days.  Calcium-Cholecalciferol, D3, (CALCIUM 600 WITH VITAMIN D3) 600 mg(1,500mg) -400 unit cap Take 1 Tab by mouth daily. Indications: osteoporosis, a condition of weak bones     No current facility-administered medications for this visit. I have reviewed the nurses notes, vitals, problem list, allergy list, medical history, family, social history and medications.        REVIEW OF SYMPTOMS General: Pt denies excessive weight gain or loss. Pt is able to conduct ADL's  HEENT: Denies blurred vision, headaches, hearing loss, epistaxis and difficulty swallowing. Respiratory: Denies cough, congestion, shortness of breath, SOUSA, wheezing or stridor. Cardiovascular: Denies precordial pain, palpitations, edema or PND  Gastrointestinal: Denies poor appetite, indigestion, abdominal pain or blood in stool  Genitourinary: Denies hematuria, dysuria, increased urinary frequency  Musculoskeletal: Denies joint pain or swelling from muscles or joints  Neurologic: Denies tremor, paresthesias, headache, or sensory motor disturbance  Psychiatric: Denies confusion, insomnia, depression  Integumentray: Denies rash, itching or ulcers. Hematologic: Denies easy bruising, bleeding       PHYSICAL EXAMINATION      Vitals: see vitals section  General: Well developed, in no acute distress. HEENT: No jaundice, oral mucosa moist, no oral ulcers  Neck: Supple, no stiffness, no lymphadenopathy, supple  Heart:  irreg irreg, no murmur, gallop or rub, no jugular venous distention  Respiratory: Clear bilaterally x 4, no wheezing or rales  Abdomen:   Soft, non-tender, bowel sounds are active. Extremities:  No edema, normal cap refill, no cyanosis. Musculoskeletal: No clubbing, no deformities  Neuro: A&Ox3, speech clear, gait stable, cooperative, no focal neurologic deficits  Skin: Skin color is normal. No rashes or lesions. Non diaphoretic, moist.  Vascular: 2+ pulses symmetric in all extremities       DIAGNOSTIC DATA      EKG:   There were no vitals taken for this visit.      LABORATORY DATA      Lab Results   Component Value Date/Time    WBC 8.0 08/20/2021 11:46 AM    HGB 12.5 08/20/2021 11:46 AM    HCT 42.6 08/20/2021 11:46 AM    PLATELET 973 (L) 31/11/7827 11:46 AM    MCV 90.8 08/20/2021 11:46 AM      Lab Results   Component Value Date/Time    Sodium 138 08/20/2021 11:46 AM    Potassium 4.1 08/20/2021 11:46 AM    Chloride 108 08/20/2021 11:46 AM    CO2 25 08/20/2021 11:46 AM    Anion gap 5 08/20/2021 11:46 AM    Glucose 89 08/20/2021 11:46 AM    BUN 14 08/20/2021 11:46 AM    Creatinine 0.98 08/20/2021 11:46 AM    BUN/Creatinine ratio 14 08/20/2021 11:46 AM    GFR est AA >60 08/20/2021 11:46 AM    GFR est non-AA 54 (L) 08/20/2021 11:46 AM    Calcium 8.9 08/20/2021 11:46 AM    Bilirubin, total 0.4 03/23/2021 01:41 PM    Alk. phosphatase 95 03/23/2021 01:41 PM    Protein, total 7.4 03/23/2021 01:41 PM    Albumin 4.1 03/23/2021 01:41 PM    Globulin 3.3 03/23/2021 01:41 PM    A-G Ratio 1.2 03/23/2021 01:41 PM    ALT (SGPT) 18 03/23/2021 01:41 PM           ASSESSMENT      1. Atrial fibrillation   A. Persistent   B. RVR   C. Failed amiodarone  2. NHL   A. S/p radiation/chemo  3. Bradycardia       PLAN     Device interrogation shows normal functioning with >90% RVP. She will continue with current medical therapy and follow up for rate lowering protocol in 2 weeks, and again in 3 months with Dr. Karlee Bran. FOLLOW-UP       Thank you, Isiah Kaur MD for allowing me to participate in the care of this extraordinarily pleasant female. Please do not hesitate to contact me for further questions/concerns.        ROSI Farias [facility].  [address] [facility], [facility], [address] [place] [address], [facility]  [phone] / [phone] Fax   [phone] / [phone] Fax

## 2021-10-22 ENCOUNTER — OFFICE VISIT (OUTPATIENT)
Dept: CARDIOLOGY CLINIC | Age: 86
End: 2021-10-22
Payer: MEDICARE

## 2021-10-22 DIAGNOSIS — Z95.0 CARDIAC PACEMAKER IN SITU: Primary | ICD-10-CM

## 2021-10-22 PROCEDURE — 93279 PRGRMG DEV EVAL PM/LDLS PM: CPT | Performed by: INTERNAL MEDICINE

## 2021-10-22 NOTE — PROGRESS NOTES
chargeable VVI  pacer visit for rate drop fro 80 to 70 ppm; post AVN ablation    See scanned document for details.

## 2021-12-03 ENCOUNTER — OFFICE VISIT (OUTPATIENT)
Dept: CARDIOLOGY CLINIC | Age: 86
End: 2021-12-03
Payer: MEDICARE

## 2021-12-03 ENCOUNTER — OFFICE VISIT (OUTPATIENT)
Dept: CARDIOLOGY CLINIC | Age: 86
End: 2021-12-03

## 2021-12-03 VITALS
SYSTOLIC BLOOD PRESSURE: 140 MMHG | WEIGHT: 141.8 LBS | DIASTOLIC BLOOD PRESSURE: 72 MMHG | BODY MASS INDEX: 26.09 KG/M2 | HEIGHT: 62 IN | RESPIRATION RATE: 18 BRPM | HEART RATE: 74 BPM | OXYGEN SATURATION: 98 %

## 2021-12-03 DIAGNOSIS — Z95.0 CARDIAC PACEMAKER IN SITU: Primary | ICD-10-CM

## 2021-12-03 PROCEDURE — G8754 DIAS BP LESS 90: HCPCS | Performed by: INTERNAL MEDICINE

## 2021-12-03 PROCEDURE — 1090F PRES/ABSN URINE INCON ASSESS: CPT | Performed by: INTERNAL MEDICINE

## 2021-12-03 PROCEDURE — 99215 OFFICE O/P EST HI 40 MIN: CPT | Performed by: INTERNAL MEDICINE

## 2021-12-03 PROCEDURE — G8427 DOCREV CUR MEDS BY ELIG CLIN: HCPCS | Performed by: INTERNAL MEDICINE

## 2021-12-03 PROCEDURE — G8536 NO DOC ELDER MAL SCRN: HCPCS | Performed by: INTERNAL MEDICINE

## 2021-12-03 PROCEDURE — G8753 SYS BP > OR = 140: HCPCS | Performed by: INTERNAL MEDICINE

## 2021-12-03 PROCEDURE — 93279 PRGRMG DEV EVAL PM/LDLS PM: CPT | Performed by: NURSE PRACTITIONER

## 2021-12-03 PROCEDURE — G8419 CALC BMI OUT NRM PARAM NOF/U: HCPCS | Performed by: INTERNAL MEDICINE

## 2021-12-03 PROCEDURE — G8400 PT W/DXA NO RESULTS DOC: HCPCS | Performed by: INTERNAL MEDICINE

## 2021-12-03 PROCEDURE — G8510 SCR DEP NEG, NO PLAN REQD: HCPCS | Performed by: INTERNAL MEDICINE

## 2021-12-03 PROCEDURE — 1101F PT FALLS ASSESS-DOCD LE1/YR: CPT | Performed by: INTERNAL MEDICINE

## 2021-12-03 NOTE — PROGRESS NOTES
Room #: 5    C/o dizziness and fatigue. Visit Vitals  BP (!) 140/72 (BP 1 Location: Right upper arm, BP Patient Position: Sitting, BP Cuff Size: Adult)   Pulse 74   Resp 18   Ht 5' 2\" (1.575 m)   Wt 141 lb 12.8 oz (64.3 kg)   SpO2 98%   BMI 25.94 kg/m²         Chest pain:  NO  Shortness of breath:  NO  Edema: NO  Palpitations, skipped beats, rapid heartbeat:  NO  Dizziness:  YES    1. Have you been to the ER, urgent care clinic since your last visit? Hospitalized since your last visit? No    2. Have you seen or consulted any other health care providers outside of the [address] [place] [address] since your last visit? Include any pap smears or colon screening.  No      Refills:  NO

## 2021-12-03 NOTE — PROGRESS NOTES
HISTORY OF PRESENTING ILLNESS      Severiano Arnt is a 81 y.o. female presenting for followup after AV node ablation in 9/2021. Device interrogation shows normal device functioning. She denies shortness of breath, lower extremity edema. PAST MEDICAL HISTORY     Past Medical History:   Diagnosis Date    Asthma     [facility] disease ([facility]     Hypertension     Hypothyroid     Lupus ([facility]     Non Hodgkin's lymphoma ([facility]            PAST SURGICAL HISTORY     No past surgical history on file. ALLERGIES     Allergies   Allergen Reactions    Latex Rash          FAMILY HISTORY     Family History   Problem Relation Age of Onset    Heart Disease Mother     negative for cardiac disease       SOCIAL HISTORY     Social History     Socioeconomic History    Marital status:    Tobacco Use    Smoking status: Never Smoker    Smokeless tobacco: Never Used   Substance and Sexual Activity    Alcohol use: Never    Drug use: Never         MEDICATIONS     Current Outpatient Medications   Medication Sig    hydrOXYchloroQUINE (PLAQUENIL) 200 mg tablet Take 200 mg by mouth daily.  apixaban (ELIQUIS) 5 mg tablet Take 1 Tab by mouth two (2) times a day.  fluticasone-umeclidinium-vilanterol (Trelegy Ellipta) 100-62.5-25 mcg inhaler     furosemide (LASIX) 40 mg tablet Take 1 Tab by mouth daily. (Patient taking differently: Take 20 mg by mouth daily. 1/2 tablet)    ondansetron (ZOFRAN ODT) 8 mg disintegrating tablet Take 8 mg by mouth every eight (8) hours as needed for Nausea.  albuterol sulfate (PROVENTIL;VENTOLIN) 2.5 mg/0.5 mL nebu nebulizer solution by Nebulization route as needed for Wheezing.  acetaminophen (TYLENOL) 325 mg tablet Take  by mouth every four (4) hours as needed for Pain.  montelukast (SINGULAIR) 10 mg tablet Take 10 mg by mouth daily.  omeprazole (PRILOSEC) 20 mg capsule Take 20 mg by mouth daily.     folic acid (FOLVITE) 1 mg tablet Take 1 mg by mouth daily.  aspirin 81 mg chewable tablet Take 81 mg by mouth nightly.  atorvastatin (LIPITOR) 20 mg tablet Take 20 mg by mouth daily.  levothyroxine (SYNTHROID) 125 mcg tablet Take 125 mcg by mouth Daily (before breakfast).  albuterol (PROVENTIL HFA, VENTOLIN HFA, PROAIR HFA) 90 mcg/actuation inhaler Take 2 Puffs by inhalation every six (6) hours as needed for Wheezing.  alendronate (FOSAMAX) 70 mg tablet Take 70 mg by mouth every seven (7) days.  Calcium-Cholecalciferol, D3, (CALCIUM 600 WITH VITAMIN D3) 600 mg(1,500mg) -400 unit cap Take 1 Tab by mouth daily. Indications: osteoporosis, a condition of weak bones    ferrous sulfate 325 mg (65 mg iron) tablet  (Patient not taking: Reported on 12/3/2021)     No current facility-administered medications for this visit. I have reviewed the nurses notes, vitals, problem list, allergy list, medical history, family, social history and medications. REVIEW OF SYMPTOMS      General: Pt denies excessive weight gain or loss. Pt is able to conduct ADL's  HEENT: Denies blurred vision, headaches, hearing loss, epistaxis and difficulty swallowing. Respiratory: Denies cough, congestion, shortness of breath, SOUSA, wheezing or stridor. Cardiovascular: Denies precordial pain, palpitations, edema or PND  Gastrointestinal: Denies poor appetite, indigestion, abdominal pain or blood in stool  Genitourinary: Denies hematuria, dysuria, increased urinary frequency  Musculoskeletal: Denies joint pain or swelling from muscles or joints  Neurologic: Denies tremor, paresthesias, headache, or sensory motor disturbance  Psychiatric: Denies confusion, insomnia, depression  Integumentray: Denies rash, itching or ulcers. Hematologic: Denies easy bruising, bleeding       PHYSICAL EXAMINATION      Vitals: see vitals section  General: Well developed, in no acute distress.   HEENT: No jaundice, oral mucosa moist, no oral ulcers  Neck: Supple, no stiffness, no lymphadenopathy, supple  Heart:  Normal S1/S2 negative S3 or S4. Regular, no murmur, gallop or rub, no jugular venous distention  Respiratory: Clear bilaterally x 4, no wheezing or rales  Abdomen:   Soft, non-tender, bowel sounds are active. Extremities:  No edema, normal cap refill, no cyanosis. Musculoskeletal: No clubbing, no deformities  Neuro: A&Ox3, speech clear, gait stable, cooperative, no focal neurologic deficits  Skin: Skin color is normal. No rashes or lesions. Non diaphoretic, moist.  Vascular: 2+ pulses symmetric in all extremities       DIAGNOSTIC DATA      EKG:   Visit Vitals  BP (!) 140/72 (BP 1 Location: Right upper arm, BP Patient Position: Sitting, BP Cuff Size: Adult)   Pulse 74   Resp 18   Ht 5' 2\" (1.575 m)   Wt 141 lb 12.8 oz (64.3 kg)   SpO2 98%   BMI 25.94 kg/m²        LABORATORY DATA      Lab Results   Component Value Date/Time    WBC 8.0 08/20/2021 11:46 AM    HGB 12.5 08/20/2021 11:46 AM    HCT 42.6 08/20/2021 11:46 AM    PLATELET 001 (L) 83/95/0940 11:46 AM    MCV 90.8 08/20/2021 11:46 AM      Lab Results   Component Value Date/Time    Sodium 138 08/20/2021 11:46 AM    Potassium 4.1 08/20/2021 11:46 AM    Chloride 108 08/20/2021 11:46 AM    CO2 25 08/20/2021 11:46 AM    Anion gap 5 08/20/2021 11:46 AM    Glucose 89 08/20/2021 11:46 AM    BUN 14 08/20/2021 11:46 AM    Creatinine 0.98 08/20/2021 11:46 AM    BUN/Creatinine ratio 14 08/20/2021 11:46 AM    GFR est AA >60 08/20/2021 11:46 AM    GFR est non-AA 54 (L) 08/20/2021 11:46 AM    Calcium 8.9 08/20/2021 11:46 AM    Bilirubin, total 0.4 03/23/2021 01:41 PM    Alk. phosphatase 95 03/23/2021 01:41 PM    Protein, total 7.4 03/23/2021 01:41 PM    Albumin 4.1 03/23/2021 01:41 PM    Globulin 3.3 03/23/2021 01:41 PM    A-G Ratio 1.2 03/23/2021 01:41 PM    ALT (SGPT) 18 03/23/2021 01:41 PM           ASSESSMENT      1.  Atrial fibrillation   A. AV node ablation         PLAN     Continue monitoring in device clinic      ICD-10-CM ICD-9-CM 1. Cardiac pacemaker in situ  Z95.0 V45.01      No orders of the defined types were placed in this encounter. FOLLOW-UP       Thank you, Susi Becker MD for allowing me to participate in the care of this extraordinarily pleasant female. Please do not hesitate to contact me for further questions/concerns.          Jensne Young MD  Cardiac Electrophysiology / Cardiology    [address], [facility] [address] Keisha Faye.    Jackie Garay  [phone] / [phone] Fax   [phone] / [phone] Fax

## 2022-03-08 ENCOUNTER — TELEPHONE (OUTPATIENT)
Dept: CARDIOLOGY CLINIC | Age: 87
End: 2022-03-08

## 2022-03-08 NOTE — TELEPHONE ENCOUNTER
Patient is calling in regards to a new life line that she has gotten , she was told that she would need to call and get permission to wear it. She has concerns that the magnets will interfere with her hear monitor. Please Advise .      [place] [phone]

## 2022-03-09 NOTE — TELEPHONE ENCOUNTER
Ham Potts, ROSI Hodge; Becky Contreras, LPN; Grisel Burton, RN  Caller: Unspecified Sherine Seay,  3:53 PM)    Shana/ Nell, no contraindication for her lifeline. Returned patient call, ID verified using two patient identifiers. Advised patient that there should be no contraindication between her life line and her pacemaker. [employer] contact number provided [phone] for patient to call for specific device related questions. Patient verbalized understanding and will call with any other questions.

## 2022-03-18 PROBLEM — M32.9 LUPUS (HCC): Status: ACTIVE | Noted: 2019-07-15

## 2022-03-18 PROBLEM — I10 HTN (HYPERTENSION), BENIGN: Status: ACTIVE | Noted: 2019-07-16

## 2022-03-19 PROBLEM — J45.909 ASTHMA: Status: ACTIVE | Noted: 2019-07-15

## 2022-03-19 PROBLEM — I21.4 NSTEMI (NON-ST ELEVATED MYOCARDIAL INFARCTION) (HCC): Status: ACTIVE | Noted: 2019-07-15

## 2022-03-19 PROBLEM — E03.9 HYPOTHYROID: Status: ACTIVE | Noted: 2019-07-15

## 2022-03-20 PROBLEM — I25.118 CORONARY ARTERY DISEASE OF NATIVE ARTERY OF NATIVE HEART WITH STABLE ANGINA PECTORIS (HCC): Status: ACTIVE | Noted: 2019-07-16

## 2022-03-29 ENCOUNTER — OFFICE VISIT (OUTPATIENT)
Dept: CARDIOLOGY CLINIC | Age: 87
End: 2022-03-29
Payer: MEDICARE

## 2022-03-29 DIAGNOSIS — Z95.0 CARDIAC PACEMAKER IN SITU: Primary | ICD-10-CM

## 2022-03-29 PROCEDURE — 93296 REM INTERROG EVL PM/IDS: CPT | Performed by: INTERNAL MEDICINE

## 2022-03-29 PROCEDURE — 93294 REM INTERROG EVL PM/LDLS PM: CPT | Performed by: INTERNAL MEDICINE

## 2022-03-29 NOTE — LETTER
3/29/2022 3:16 PM    Ms. Esperanza Potts  [address] [facility] [address]    Dear Ms. Esperanza Potts,    We have received your recent remote monitor check of your implanted device on 3/28/22. Your remaining estimated battery life is 129 years and your device is working normally & appropriately. Your next remote monitor check is scheduled for  7/5/2022. This is NOT an in-clinic appointment. This transmission is sent from your home monitor. Please make sure your home monitor is plugged into power and within 10 feet of where you sleep. If you have difficulty sending a transmission, please do NOT call our office. Instead, call tech support for your device as they are better able to assist.    [facility])  [phone]    Your next clinic/office check is scheduled for Monday, 12/12/2022 at 1:00 pm.  You will have your device checked then see the provider. Please bring a complete list of your medications with strengths and dosages to this appointment. If you have any questions, please call the Pacemaker/ICD clinic at the [facility] location at [phone]. We appreciate you staying remotely connected!     Sincerely,    Gabriel Ma RN, BSN  Device Coordinator  [phone]

## 2022-07-05 ENCOUNTER — OFFICE VISIT (OUTPATIENT)
Dept: CARDIOLOGY CLINIC | Age: 87
End: 2022-07-05
Payer: MEDICARE

## 2022-07-05 DIAGNOSIS — Z95.0 CARDIAC PACEMAKER IN SITU: Primary | ICD-10-CM

## 2022-07-05 PROCEDURE — 93296 REM INTERROG EVL PM/IDS: CPT | Performed by: INTERNAL MEDICINE

## 2022-07-05 PROCEDURE — 93294 REM INTERROG EVL PM/LDLS PM: CPT | Performed by: INTERNAL MEDICINE

## 2022-10-03 PROCEDURE — 93296 REM INTERROG EVL PM/IDS: CPT | Performed by: INTERNAL MEDICINE

## 2022-10-03 PROCEDURE — 93294 REM INTERROG EVL PM/LDLS PM: CPT | Performed by: INTERNAL MEDICINE

## 2022-10-04 ENCOUNTER — OFFICE VISIT (OUTPATIENT)
Dept: CARDIOLOGY CLINIC | Age: 87
End: 2022-10-04
Payer: MEDICARE

## 2022-10-04 DIAGNOSIS — Z95.0 CARDIAC PACEMAKER IN SITU: Primary | ICD-10-CM

## 2022-12-12 ENCOUNTER — OFFICE VISIT (OUTPATIENT)
Dept: CARDIOLOGY CLINIC | Age: 87
End: 2022-12-12
Payer: MEDICARE

## 2022-12-12 ENCOUNTER — OFFICE VISIT (OUTPATIENT)
Dept: CARDIOLOGY CLINIC | Age: 87
End: 2022-12-12

## 2022-12-12 VITALS
HEIGHT: 62 IN | RESPIRATION RATE: 18 BRPM | SYSTOLIC BLOOD PRESSURE: 170 MMHG | OXYGEN SATURATION: 100 % | DIASTOLIC BLOOD PRESSURE: 90 MMHG | BODY MASS INDEX: 26.79 KG/M2 | HEART RATE: 71 BPM | WEIGHT: 145.6 LBS

## 2022-12-12 DIAGNOSIS — Z98.890 S/P AV NODAL ABLATION: ICD-10-CM

## 2022-12-12 DIAGNOSIS — I48.0 PAROXYSMAL ATRIAL FIBRILLATION (HCC): Primary | ICD-10-CM

## 2022-12-12 DIAGNOSIS — R00.1 BRADYCARDIA: ICD-10-CM

## 2022-12-12 DIAGNOSIS — Z95.0 PACEMAKER: ICD-10-CM

## 2022-12-12 DIAGNOSIS — R00.1 SINUS BRADYCARDIA: ICD-10-CM

## 2022-12-12 DIAGNOSIS — Z95.0 CARDIAC PACEMAKER IN SITU: Primary | ICD-10-CM

## 2022-12-12 RX ORDER — FUROSEMIDE 40 MG/1
20 TABLET ORAL DAILY
Qty: 1 TABLET | Refills: 0
Start: 2022-12-12 | End: 2022-12-13 | Stop reason: SDUPTHER

## 2022-12-12 RX ORDER — LISINOPRIL 5 MG/1
5 TABLET ORAL DAILY
Qty: 90 TABLET | Refills: 0 | Status: SHIPPED | OUTPATIENT
Start: 2022-12-12 | End: 2022-12-13 | Stop reason: SDUPTHER

## 2022-12-12 NOTE — PROGRESS NOTES
Cardiac Electrophysiology Office Follow-up    NAME:  Ortiz Eugene   :  1935  MRM:  [id number]    Date:  2022     Primary Cardiologist: Vanessa Garza     Assessment and Plan:     1. Paroxysmal atrial fibrillation (HCC)  -     AMB POC EKG ROUTINE W/ 12 LEADS, INTER & REP  2. Bradycardia  -     AMB POC EKG ROUTINE W/ 12 LEADS, INTER & REP  3. Sinus bradycardia  -     AMB POC EKG ROUTINE W/ 12 LEADS, INTER & REP  4. Pacemaker  5. S/P AV lele ablation     Pacemaker. Persistent AF, s/p AV node ablation . Device Interrogation    Device: Medtronic single lead pacemaker  Battery/Longevity: 12 years  Programmed Mode: VVIR     RV lead    Intrinsic amplitude:  0 mV    Pacing impedance:  456 ohms    Pacing threshold: 0.75V @ 0.4ms    %pacin.7    No sustained VT/VF or therapies    Programming changes:  Iterative programming was performed to test the leads sensing and threshold parameters without any permanent changes. Anticoagulation.   - Continue Eliquis 5 mg BID.   - Denies of any bleeding issues. Know to contact clinic with any bleeding side effects (BRBPR, melena, hemotysis, hematuria). Hypertension.   - Resume lisinopril 5 mg daily. - Continue to monitor BP.   - Follow back up with Dr. Luis Miguel Guillen to reassess hypertension. Continue Q3 month remote transmissions. Follow-up in the EP clinic in one year, or sooner for any concerns. Subjective:     Ortiz Eugene, a 81y.o. year-old who presents for followup of her pacemaker. She is doing well from a cardiac standpoint and denies chest pain, dyspnea, dizziness or syncope. She has noted elevated BP at home. She was previously taking lisinopril 5 mg daily. Review of Systems:   12 point review of systems was performed.  All negative except for HPI    Exam:     Physical Exam:  BP (!) 170/90 (BP 1 Location: Left upper arm)   Pulse 71   Resp 18   Ht 5' 2\" (1.575 m)   Wt 66 kg (145 lb 9.6 oz)   SpO2 100%   BMI 26.63 kg/m²     PHYSICAL EXAM  General:  Alert and oriented, in no acute distress  Head:  Atraumatic, normocephalic  Eyes:  extraocular muscles intact  Neck:  Supple, normal range of motion  Lungs:  Clear to auscultation bilaterally, no wheezes/rales/rhonchi   Cardiovascular:  Regular rate and rhythm, normal S1-S2, no murmurs/rubs/gallops  Abdomen:  Soft, nontender, nondistended, normoactive bowel sounds  Skin:  Intact, no rash  Extremities:, no clubbing, cyanosis, or edema  Musculoskeletal: normal range of motion  Neurological:  Alert and oriented, no focal neurologic deficits  Psychiatric:  Normal mood and affect      Medications:     Current Outpatient Medications   Medication Sig    furosemide (LASIX) 40 mg tablet Take 0.5 Tablets by mouth daily. 1/2 tablet    lisinopriL (PRINIVIL, ZESTRIL) 5 mg tablet Take 1 Tablet by mouth daily for 90 days. hydrOXYchloroQUINE (PLAQUENIL) 200 mg tablet Take 200 mg by mouth daily. apixaban (ELIQUIS) 5 mg tablet Take 1 Tab by mouth two (2) times a day. fluticasone-umeclidinium-vilanterol (Trelegy Ellipta) 100-62.5-25 mcg inhaler     ondansetron (ZOFRAN ODT) 8 mg disintegrating tablet Take 8 mg by mouth every eight (8) hours as needed for Nausea. albuterol sulfate (PROVENTIL;VENTOLIN) 2.5 mg/0.5 mL nebu nebulizer solution by Nebulization route as needed for Wheezing. acetaminophen (TYLENOL) 325 mg tablet Take  by mouth every four (4) hours as needed for Pain.    montelukast (SINGULAIR) 10 mg tablet Take 10 mg by mouth daily. folic acid (FOLVITE) 1 mg tablet Take 1 mg by mouth daily. aspirin 81 mg chewable tablet Take 81 mg by mouth nightly. atorvastatin (LIPITOR) 20 mg tablet Take 20 mg by mouth daily. levothyroxine (SYNTHROID) 125 mcg tablet Take 125 mcg by mouth Daily (before breakfast). albuterol (PROVENTIL HFA, VENTOLIN HFA, PROAIR HFA) 90 mcg/actuation inhaler Take 2 Puffs by inhalation every six (6) hours as needed for Wheezing. Calcium-Cholecalciferol, D3, 600 mg-10 mcg (400 unit) cap Take 1 Tab by mouth daily. Indications: osteoporosis, a condition of weak bones     No current facility-administered medications for this visit. Diagnostic Data Review:       Results for orders placed or performed during the hospital encounter of 12/03/20   EKG, 12 LEAD, INITIAL   Result Value Ref Range    Ventricular Rate 81 BPM    Atrial Rate 81 BPM    P-R Interval 148 ms    QRS Duration 110 ms    Q-T Interval 394 ms    QTC Calculation (Bezet) 457 ms    Calculated P Axis 81 degrees    Calculated R Axis -19 degrees    Calculated T Axis 103 degrees    Diagnosis       Sinus rhythm with premature supraventricular complexes  Incomplete left bundle branch block  ST & T wave abnormality, consider lateral ischemia  Abnormal ECG  When compared with ECG of 15-JUL-2019 13:36,  premature supraventricular complexes are now present  Confirmed by Dawson Navas MD ([id number]) on 12/6/2020 11:53:46 AM        PAD form completed 8/29/19     Lipids 7/16/19 - TC 91, TG 95, HDL 31, LDL 41, VLDL 91       Cardiac Testing/ Procedures: A. Cardiac Cath/PCI:R Radial access   RCA - JR4   LCA - difficulty to access with TIG4;EBU 3.5       R CFA - micropuncture/ultrasound/fluorscopy guided access       L Main: Ca++; Ostial LM 10% ( good reflux/no dampening)       LAD: Med; MLI; D1 - MLI       LCflex: Med; MLI       RCA: Dominant  Prox 95%; Mid 50%; PDA and PLB - small; PLB branch diffuse severe dz           LVEDP:  15       LVEF: Not assessed; Nml by echo       No significant gradient across aortic valve. PCI: JR4 guide ( with SH)   BMW wire   Pre dil with 2 by 12   VINOD 2.5 by 26   Post dil with 2.75 by 12 ( at high merary - approx 3mm)   GERALD 3 before and after               Specimens Removed : None       Closure Device: TR Band - R radial       R CFA - mynx               B.ECHO/AUNDREA: 7//15/19 - EF 56-60%; Mild AR, TR, MT, MR.  Mod Pulm HTN       C.StressNuclear/Stress ECHO/Stress test:       D.Vascular:       E. EP:   8/20/21:implantation of a Medtronic single chamber pacemaker per Dr. Atif Barajas  9/20/21:AV node ablation performed using 3d mapping per Dr. Emilia Eisenberg. Miscellaneous:      Lab Review:     Lab Results   Component Value Date/Time    Cholesterol, total 91 07/16/2019 04:35 AM    HDL Cholesterol 31 07/16/2019 04:35 AM    LDL, calculated 41 07/16/2019 04:35 AM    Triglyceride 95 07/16/2019 04:35 AM    CHOL/HDL Ratio 2.9 07/16/2019 04:35 AM     Lab Results   Component Value Date/Time    Creatinine 0.98 08/20/2021 11:46 AM     Lab Results   Component Value Date/Time    BUN 14 08/20/2021 11:46 AM     Lab Results   Component Value Date/Time    Potassium 4.1 08/20/2021 11:46 AM     Lab Results   Component Value Date/Time    Hemoglobin A1c 6.3 07/16/2019 04:35 AM     Lab Results   Component Value Date/Time    HGB 12.5 08/20/2021 11:46 AM     Lab Results   Component Value Date/Time    PLATELET 633 (L) 91/11/6550 11:46 AM                  ___________________________________________________    Leslee Fuentes NP    Cardiac Electrophysiology  [facility]   [address] [facility].  [address] [facility], [address] [place] [address] [phone]

## 2022-12-12 NOTE — PROGRESS NOTES
Room #: 3    BP elevated - will need recheck. No complaints. Has questions regarding discontinued meds. Chief Complaint   Patient presents with    Annual Exam    Pacemaker Check    Irregular Heart Beat     AFIB       Visit Vitals  BP (!) 160/80 (BP 1 Location: Left upper arm, BP Patient Position: Sitting, BP Cuff Size: Adult)   Pulse 71   Resp 18   Ht 5' 2\" (1.575 m)   Wt 145 lb 9.6 oz (66 kg)   SpO2 100%   BMI 26.63 kg/m²         Chest pain:  NO  Shortness of breath:  YES  Edema: YES  Palpitations, skipped beats, rapid heartbeat:  NO  Dizziness:  NO    1. Have you been to the ER, urgent care clinic since your last visit? Hospitalized since your last visit? NO    2. Have you seen or consulted any other health care providers outside of the [address] [place] [address] since your last visit? Include any pap smears or colon screening.  NO      Refills:  NO

## 2022-12-13 ENCOUNTER — TELEPHONE (OUTPATIENT)
Dept: CARDIOLOGY CLINIC | Age: 87
End: 2022-12-13

## 2022-12-13 RX ORDER — FUROSEMIDE 40 MG/1
20 TABLET ORAL DAILY
Qty: 45 TABLET | Refills: 1 | Status: SHIPPED | OUTPATIENT
Start: 2022-12-13

## 2022-12-13 RX ORDER — LISINOPRIL 5 MG/1
5 TABLET ORAL DAILY
Qty: 90 TABLET | Refills: 1 | Status: SHIPPED | OUTPATIENT
Start: 2022-12-13

## 2022-12-13 NOTE — TELEPHONE ENCOUNTER
Requested Prescriptions     Signed Prescriptions Disp Refills    furosemide (LASIX) 40 mg tablet 45 Tablet 1     Sig: Take 0.5 Tablets by mouth daily. 1/2 tablet     Authorizing Provider: Aldair Maddox     Ordering User: ZACK Mortensen    lisinopriL (PRINIVIL, ZESTRIL) 5 mg tablet 90 Tablet 1     Sig: Take 1 Tablet by mouth daily. Authorizing Provider: Aldair Maddox     Ordering User: Barbie Schreiber     Refills per verbal order from Estuardo Boggs NP  Last visit: 12/12/22  Next visit: 11/8/23 (Dr. Esther Medina)      Called patient, spoke with her daughter Ceci Green. Patient ID verified using two patient identifiers. Advised her that prescriptions had been resent to  [facility]. She will contact the office with any further questions or concerns.

## 2023-01-03 NOTE — PROGRESS NOTES
Amanda Henao is a 81 y.o. female is here BP follow-up. Primary Cardiologist: Anton Hemphill MD    Primary care physician:  Jean-Claude Baird MD      Assessment:    A. paula with RVR-diagnosed on EKG 3/15/2021 at PCPs office. S/p hospital [facility] 5/7/2021. 7 day event monitor (6/19-6/25/21) that showed Afib with RVR. Occasional low BP and bradycardia in the 30s. Amiodarone discontinued. Single chamber pacemaker (medtronic) placed by Dr. Og Cardona on 8/20/21 for sick sinus syndrome. She had AV node ablation for afib on 9/20/21 with Dr. Og Cardona. She presented for follow-up with EP recently and had elevated BP. Was started in lisinopril 5 mg. Has not seen Dr. Severo Pattee since  6/22/2021. Patient was scheduled to see me today for this. She presents with her BP log and BP at home have been 110-120s/60-70s. No medication changes necessary. Will have her see Dr. Severo Pattee in 6 months. Denies chest pain, shortness of breath, lightheadedness, dizziness, edema. Sinus bradycardia  Chronic diastolic heart failure (HFpEF)  CAD - NSTEMI(7/15/19 )- PCI to RCA with VINOD  PVCs  HTN  HLD  Hx of Hypothyroid/SLE/Non Hodgkin's Lymphoma in remission ( last treatment 2013)  History of lupus/rheumatoid arthritis  History of CLARISSE  CKD   Anemia -  COPD on inhalers    Plan: Will see Dr. Severo Pattee in 6 months for follow-up. Will see EP as scheduled. Tolerating Eliquis - no signs/symptoms of bleeding. Lipids being followed by PCP  No medication changes - BP well controlled at home. Seems to have white coat syndrome. 04/23/21   ECHO ADULT COMPLETE 04/26/2021 4/26/2021    Narrative · LV: Calculated LVEF is 50%. Visually measured ejection fraction. Normal   cavity size and wall thickness. Inconclusive left ventricular diastolic   function. · RV: Mildly dilated right ventricle. Borderline low systolic function. · LA: Severely dilated left atrium. Left Atrium volume index is 55.8   mL/m2.   · RA: Severely dilated right atrium. · AV: Probably trileaflet aortic valve. Moderate aortic valve sclerosis   with no significant stenosis. Aortic valve leaflet calcification present. Mild aortic valve regurgitation is present. · MV: Mitral valve thickening. Moderate mitral valve regurgitation is   present. · TV: Non-specific thickening of the tricuspid valve. Moderate tricuspid   valve regurgitation is present. · PV: Mild pulmonic valve regurgitation is present. · IVC: Mildly elevated central venous pressure (8 mmHg); IVC diameter is   less than 21 mm and collapses less than 50% with respiration. · PA: Pulmonary arterial systolic pressure is 48 mmHg. · Pericardium: Trivial pericardial effusion. Irregular rhythm throughout exam       Signed by: Richelle Berger MD       Patient understands the plan. All questions were answered to the patient's satisfaction. Cardiac Testing/ Procedures: A. Cardiac Cath/PCI: 10/2/20  Access: right radial artery, 5F. Unable to engage left coronary from right radial.  Right CFA, 5F  Catheters:  Left coronary: JL 3.5, 5F (tried jl 3.5, al 1, tig 3, tig 4, BU 3.5, and Art. runthrough wire was used to stabilize catheter during angiography  Right coronary: JR4, 5F     Findings:   L Main: large caliber, ostial calcified stenosis with MLA 8.2mm2 with eccentric calcium throughout left main that is non-occlusive  LAD: large caliber vessel, small d1 and moderate d2, no critical disease  LCx: large caliber, large om1, small om2 and small om3, no critical disease  RCA: moderate caliber, previously placed proximal RCA stent patent, small PDA and small RP branch     LVEDP:  12  mmhg           Specimens Removed: None     Implants: n/a     Closure Device: radial TR band     See full cath note. Complications: none        Findings:  1. Multivessel cad with left main ostial stenosis with MLA 8.2 mm2 and patent RCA stent  2.  Borderline elevation in lvedp     Plan:     Cont medical mgmt    July 2020    R Radial access  RCA - JR4  LCA - difficulty to access with TIG4;EBU 3.5     R CFA - micropuncture/ultrasound/fluorscopy guided access     L Main: Ca++; Ostial LM 10% ( good reflux/no dampening)     LAD: Med; MLI; D1 - MLI     LCflex: Med; MLI     RCA: Dominant  Prox 95%; Mid 50%; PDA and PLB - small; PLB branch diffuse severe dz        LVEDP:  15     LVEF: Not assessed; Nml by echo     No significant gradient across aortic valve. PCI: JR4 guide ( with SH)  BMW wire  Pre dil with 2 by 12  VINOD 2.5 by 26  Post dil with 2.75 by 12 ( at high merary - approx 3mm)  GERALD 3 before and after           Specimens Removed : None     Closure Device: TR Band - R radial     R CFA - mynx          B.ECHO/AUNDREA: 7//15/19 - EF 56-60%; Mild AR, TR, WV, MR. Mod Pulm HTN    C.StressNuclear/Stress ECHO/Stress test:    D.Vascular:    E. EP: 5/7/2021-[facility]-A. fib to sinus rhythm    F. Miscellaneous:     Subjective:  Timmy Reeder is a 81 y.o. female who presents today for BP follow-up. Patient with history of A. fib with RVR-diagnosed on EKG 3/15/2021 at PCPs office. S/p hospital [facility] 5/7/2021. 7 day event monitor (6/19-6/25/21) that showed Afib with RVR. Occasional low BP and bradycardia in the 30s. Amiodarone discontinued. Single chamber pacemaker (medtronic) placed by Dr. Adeola Mcnamara on 8/20/21 for sick sinus syndrome. She had AV node ablation for afib on 9/20/21 with Dr. Adeola Mcnamara. She was seen by EP and had elevated BP.       ROS:  (bold if positive, if negative)           Medications before admission:    Current Outpatient Medications   Medication Sig Dispense    furosemide (LASIX) 40 mg tablet Take 0.5 Tablets by mouth daily. 1/2 tablet 45 Tablet    lisinopriL (PRINIVIL, ZESTRIL) 5 mg tablet Take 1 Tablet by mouth daily. 90 Tablet    hydrOXYchloroQUINE (PLAQUENIL) 200 mg tablet Take 200 mg by mouth daily. apixaban (ELIQUIS) 5 mg tablet Take 1 Tab by mouth two (2) times a day.  56 Tab    fluticasone-umeclidinium-vilanterol (Trelegy Ellipta) 100-62.5-25 mcg inhaler      ondansetron (ZOFRAN ODT) 8 mg disintegrating tablet Take 8 mg by mouth every eight (8) hours as needed for Nausea. albuterol sulfate (PROVENTIL;VENTOLIN) 2.5 mg/0.5 mL nebu nebulizer solution by Nebulization route as needed for Wheezing. acetaminophen (TYLENOL) 325 mg tablet Take  by mouth every four (4) hours as needed for Pain.     montelukast (SINGULAIR) 10 mg tablet Take 10 mg by mouth daily. folic acid (FOLVITE) 1 mg tablet Take 1 mg by mouth daily. aspirin 81 mg chewable tablet Take 81 mg by mouth nightly. atorvastatin (LIPITOR) 20 mg tablet Take 20 mg by mouth daily. levothyroxine (SYNTHROID) 125 mcg tablet Take 125 mcg by mouth Daily (before breakfast). albuterol (PROVENTIL HFA, VENTOLIN HFA, PROAIR HFA) 90 mcg/actuation inhaler Take 2 Puffs by inhalation every six (6) hours as needed for Wheezing. Calcium-Cholecalciferol, D3, 600 mg-10 mcg (400 unit) cap Take 1 Tab by mouth daily. Indications: osteoporosis, a condition of weak bones      No current facility-administered medications for this visit. Family History of CAD:    No    Social History:  Current  Smoker  Yes    Physical Exam:  There were no vitals taken for this visit. Gen: Well-developed, well-nourished, in no acute distress  Neck: Supple,No JVD, No Carotid Bruit,   Resp: No accessory muscle use, Clear breath sounds, No rales or rhonchi  Card: Irregular rate,Rythm,Normal S1, S2, 2/6 sys murmur+,No rubs or gallop. No thrills.    Abd:  Soft, BS+,   MSK: No cyanosis  Skin: No rashes    Neuro: moving all four extremities , follows commands appropriately  Psych:  Good insight, oriented to person, place , alert, Nml Affect  LE: No edema    EKG:       LABS:        Lab Results   Component Value Date/Time    WBC 8.0 08/20/2021 11:46 AM    HGB 12.5 08/20/2021 11:46 AM    HCT 42.6 08/20/2021 11:46 AM    PLATELET 627 (L) 85/20/5248 11:46 AM     Lab Results   Component Value Date/Time    Sodium 138 08/20/2021 11:46 AM    Potassium 4.1 08/20/2021 11:46 AM    Chloride 108 08/20/2021 11:46 AM    CO2 25 08/20/2021 11:46 AM    Anion gap 5 08/20/2021 11:46 AM    Glucose 89 08/20/2021 11:46 AM    BUN 14 08/20/2021 11:46 AM    Creatinine 0.98 08/20/2021 11:46 AM    BUN/Creatinine ratio 14 08/20/2021 11:46 AM    GFR est AA >60 08/20/2021 11:46 AM    GFR est non-AA 54 (L) 08/20/2021 11:46 AM    Calcium 8.9 08/20/2021 11:46 AM             KUSH Moreno, ROSI    [id number], [address]  [place] [address]  [address]  [address]  Ph: [phone]   Ph [phone]

## 2023-01-04 ENCOUNTER — OFFICE VISIT (OUTPATIENT)
Dept: CARDIOLOGY CLINIC | Age: 88
End: 2023-01-04
Payer: MEDICARE

## 2023-01-04 VITALS
HEART RATE: 88 BPM | HEIGHT: 62 IN | WEIGHT: 142.6 LBS | SYSTOLIC BLOOD PRESSURE: 150 MMHG | BODY MASS INDEX: 26.24 KG/M2 | OXYGEN SATURATION: 99 % | DIASTOLIC BLOOD PRESSURE: 74 MMHG

## 2023-01-04 DIAGNOSIS — I10 ESSENTIAL HYPERTENSION: ICD-10-CM

## 2023-01-04 DIAGNOSIS — Z95.0 CARDIAC PACEMAKER IN SITU: Primary | ICD-10-CM

## 2023-01-04 DIAGNOSIS — I10 WHITE COAT SYNDROME WITH DIAGNOSIS OF HYPERTENSION: ICD-10-CM

## 2023-01-04 NOTE — PROGRESS NOTES
Lyric Joseph is a 81 y.o. female    Chief Complaint   Patient presents with    Follow-up     1 month bp f/u       Vitals:    01/04/23 1303   BP: (!) 152/76   BP 1 Location: Left arm   BP Patient Position: Sitting   BP Cuff Size: Adult   Pulse: 88   Height: 5' 2\" (1.575 m)   Weight: 142 lb 9.6 oz (64.7 kg)   SpO2: 99%       Chest pain no    SOB no    Dizziness no    Swelling no    Refills no      1. Have you been to the ER, urgent care clinic since your last visit? Hospitalized since your last visit? No    2. Have you seen or consulted any other health care providers outside of the [address] [place] [address] since your last visit? Include any pap smears or colon screening.  No

## 2023-01-04 NOTE — LETTER
1/4/2023    Patient: Viviane Dance   YOB: 1935   Date of Visit: 1/4/2023     Rose Abarca MD  [address] [place] [address]  [facility] [address]  Via Fax: [phone]    Dear Rose Abarca MD,      Thank you for referring Ms. Viviane Dance to [address] [place] [address] for evaluation. My notes for this consultation are attached. If you have questions, please do not hesitate to call me. I look forward to following your patient along with you.       Sincerely,    KUSH Garnica

## 2023-03-14 ENCOUNTER — OFFICE VISIT (OUTPATIENT)
Dept: CARDIOLOGY CLINIC | Age: 88
End: 2023-03-14

## 2023-03-14 DIAGNOSIS — Z95.0 CARDIAC PACEMAKER IN SITU: Primary | ICD-10-CM

## 2023-06-13 ENCOUNTER — NURSE ONLY (OUTPATIENT)
Age: 88
End: 2023-06-13

## 2023-06-13 DIAGNOSIS — Z95.0 PRESENCE OF CARDIAC PACEMAKER: Primary | ICD-10-CM

## 2023-06-22 ENCOUNTER — TELEPHONE (OUTPATIENT)
Age: 88
End: 2023-06-22

## 2023-06-22 NOTE — TELEPHONE ENCOUNTER
Pt's daughter Mynor Kebede is rtc call to the nurse. Pt's daughter is having problems with [facility] and is going to contact them for assistance so she is unable to use the service at this time.     [phone] Mynor Kebede (daughter)

## 2023-06-22 NOTE — TELEPHONE ENCOUNTER
Pt's daughter requested to speak with the nurse, about a continuing eliquis, pt having symptoms and it may be related to taking the eliquis. Having chills and shakes and bruising.        Alesha Kim (daughter)  [phone]

## 2023-06-22 NOTE — TELEPHONE ENCOUNTER
Spoke with patient's daughter Dolly Petit. Stated patient has been having excessive bruising on hands and arms. Extreme cold in extremities. Was asked per pulmonologist to contact us regarding Eliquis. Currently taking 5mg BID. Recent blood pressures 130's/60's. Pulse in 80's. Negative for fever. Please advise.     Future Appointments   Date Time Provider [address]   7/10/2023  1:20 PM MD TRENT Burt BS AMB   11/8/2023  1:00 PM PACEMAKERTIP AMB   11/8/2023  1:20 PM MD TRENT Crandall BS AMB

## 2023-06-23 DIAGNOSIS — I48.0 PAROXYSMAL ATRIAL FIBRILLATION (HCC): Primary | ICD-10-CM

## 2023-07-10 ENCOUNTER — OFFICE VISIT (OUTPATIENT)
Age: 88
End: 2023-07-10
Payer: MEDICAID

## 2023-07-10 ENCOUNTER — CLINICAL DOCUMENTATION (OUTPATIENT)
Age: 88
End: 2023-07-10

## 2023-07-10 VITALS
WEIGHT: 136 LBS | BODY MASS INDEX: 24.87 KG/M2 | DIASTOLIC BLOOD PRESSURE: 50 MMHG | SYSTOLIC BLOOD PRESSURE: 128 MMHG | OXYGEN SATURATION: 97 % | HEART RATE: 81 BPM

## 2023-07-10 DIAGNOSIS — Z95.0 PRESENCE OF CARDIAC PACEMAKER: ICD-10-CM

## 2023-07-10 DIAGNOSIS — I10 ESSENTIAL (PRIMARY) HYPERTENSION: ICD-10-CM

## 2023-07-10 DIAGNOSIS — Z79.01 CHRONIC ANTICOAGULATION: ICD-10-CM

## 2023-07-10 DIAGNOSIS — R00.2 PALPITATIONS: ICD-10-CM

## 2023-07-10 DIAGNOSIS — I48.0 PAROXYSMAL ATRIAL FIBRILLATION (HCC): Primary | ICD-10-CM

## 2023-07-10 PROCEDURE — 93005 ELECTROCARDIOGRAM TRACING: CPT | Performed by: INTERNAL MEDICINE

## 2023-07-10 PROCEDURE — 99214 OFFICE O/P EST MOD 30 MIN: CPT | Performed by: INTERNAL MEDICINE

## 2023-07-10 RX ORDER — OMEPRAZOLE 20 MG/1
1 CAPSULE, DELAYED RELEASE ORAL
COMMUNITY
Start: 2013-06-03

## 2023-07-10 RX ORDER — LEVOTHYROXINE SODIUM 112 UG/1
TABLET ORAL
COMMUNITY
Start: 2023-06-08

## 2023-07-10 NOTE — PROGRESS NOTES
Pricilla Parr is a 81 y.o. female    had concerns including Atrial Fibrillation, Hypertension, Congestive Heart Failure, Coronary Artery Disease, and Hyperlipidemia. Vitals:    07/10/23 1319   BP: (!) 128/50   Site: Left Upper Arm   Position: Sitting   Pulse: 81   SpO2: 97%   Weight: 136 lb (61.7 kg)        Chest pain NO    SOB OCCASSIONALLY WHEN IN MOVEMENT     Dizziness NO    Swelling NO    Palpitations NO    Refills NO    Covid Vaccinated YES       1. Have you been to the ER, urgent care clinic since your last visit? Hospitalized since your last visit? YES June 25,2023 FOR COPD    2. Have you seen or consulted any other health care providers outside of the [address] [place] [address] Avenue since your last visit? Include any pap smears or colon screening.  NO

## 2023-11-08 ENCOUNTER — PROCEDURE VISIT (OUTPATIENT)
Age: 88
End: 2023-11-08

## 2023-11-08 ENCOUNTER — OFFICE VISIT (OUTPATIENT)
Age: 88
End: 2023-11-08
Payer: MEDICAID

## 2023-11-08 VITALS
WEIGHT: 146.8 LBS | RESPIRATION RATE: 18 BRPM | HEART RATE: 68 BPM | OXYGEN SATURATION: 97 % | BODY MASS INDEX: 27.02 KG/M2 | HEIGHT: 62 IN | SYSTOLIC BLOOD PRESSURE: 130 MMHG | DIASTOLIC BLOOD PRESSURE: 82 MMHG

## 2023-11-08 DIAGNOSIS — I34.0 MITRAL REGURGITATION: ICD-10-CM

## 2023-11-08 DIAGNOSIS — Z95.0 PACEMAKER: Primary | ICD-10-CM

## 2023-11-08 DIAGNOSIS — I07.1 TRICUSPID REGURGITATION: ICD-10-CM

## 2023-11-08 DIAGNOSIS — I10 HTN (HYPERTENSION), BENIGN: ICD-10-CM

## 2023-11-08 DIAGNOSIS — Z98.890 S/P AV NODAL ABLATION: ICD-10-CM

## 2023-11-08 DIAGNOSIS — I48.11 LONGSTANDING PERSISTENT ATRIAL FIBRILLATION (HCC): ICD-10-CM

## 2023-11-08 DIAGNOSIS — Z79.01 ANTICOAGULATED: ICD-10-CM

## 2023-11-08 DIAGNOSIS — I25.10 CORONARY ARTERY DISEASE INVOLVING NATIVE CORONARY ARTERY OF NATIVE HEART WITHOUT ANGINA PECTORIS: ICD-10-CM

## 2023-11-08 DIAGNOSIS — I47.29 NSVT (NONSUSTAINED VENTRICULAR TACHYCARDIA) (HCC): ICD-10-CM

## 2023-11-08 DIAGNOSIS — Z95.0 PRESENCE OF CARDIAC PACEMAKER: Primary | ICD-10-CM

## 2023-11-08 PROCEDURE — 99214 OFFICE O/P EST MOD 30 MIN: CPT | Performed by: INTERNAL MEDICINE

## 2023-11-08 RX ORDER — PREDNISONE 10 MG/1
10 TABLET ORAL DAILY
COMMUNITY

## 2023-11-10 ENCOUNTER — ANCILLARY PROCEDURE (OUTPATIENT)
Age: 88
End: 2023-11-10
Payer: MEDICAID

## 2023-11-10 VITALS
HEIGHT: 62 IN | SYSTOLIC BLOOD PRESSURE: 150 MMHG | DIASTOLIC BLOOD PRESSURE: 82 MMHG | BODY MASS INDEX: 26.87 KG/M2 | HEART RATE: 80 BPM | WEIGHT: 146 LBS

## 2023-11-10 DIAGNOSIS — I07.1 TRICUSPID REGURGITATION: ICD-10-CM

## 2023-11-10 DIAGNOSIS — I34.0 MITRAL REGURGITATION: ICD-10-CM

## 2023-11-10 PROCEDURE — 93306 TTE W/DOPPLER COMPLETE: CPT | Performed by: INTERNAL MEDICINE

## 2023-11-12 LAB
ECHO AO ASC DIAM: 3.3 CM
ECHO AO ASCENDING AORTA INDEX: 1.98 CM/M2
ECHO AO ROOT DIAM: 3.1 CM
ECHO AO ROOT INDEX: 1.86 CM/M2
ECHO AV AREA PEAK VELOCITY: 2.1 CM2
ECHO AV AREA VTI: 2.2 CM2
ECHO AV AREA/BSA PEAK VELOCITY: 1.3 CM2/M2
ECHO AV AREA/BSA VTI: 1.3 CM2/M2
ECHO AV MEAN GRADIENT: 8 MMHG
ECHO AV MEAN VELOCITY: 1.3 M/S
ECHO AV PEAK GRADIENT: 14 MMHG
ECHO AV PEAK VELOCITY: 1.8 M/S
ECHO AV VELOCITY RATIO: 0.61
ECHO AV VTI: 33.5 CM
ECHO BSA: 1.7 M2
ECHO EST RA PRESSURE: 3 MMHG
ECHO LA DIAMETER INDEX: 3.23 CM/M2
ECHO LA DIAMETER: 5.4 CM
ECHO LA TO AORTIC ROOT RATIO: 1.74
ECHO LA VOL A-L A2C: 87 ML (ref 22–52)
ECHO LA VOL A-L A4C: 99 ML (ref 22–52)
ECHO LA VOL MOD A2C: 83 ML (ref 22–52)
ECHO LA VOL MOD A4C: 94 ML (ref 22–52)
ECHO LA VOLUME AREA LENGTH: 94 ML
ECHO LA VOLUME INDEX A-L A2C: 52 ML/M2 (ref 16–34)
ECHO LA VOLUME INDEX A-L A4C: 59 ML/M2 (ref 16–34)
ECHO LA VOLUME INDEX AREA LENGTH: 56 ML/M2 (ref 16–34)
ECHO LA VOLUME INDEX MOD A2C: 50 ML/M2 (ref 16–34)
ECHO LA VOLUME INDEX MOD A4C: 56 ML/M2 (ref 16–34)
ECHO LV E' LATERAL VELOCITY: 6 CM/S
ECHO LV E' SEPTAL VELOCITY: 6 CM/S
ECHO LV EDV A2C: 81 ML
ECHO LV EDV A4C: 71 ML
ECHO LV EDV BP: 78 ML (ref 56–104)
ECHO LV EDV INDEX A4C: 43 ML/M2
ECHO LV EDV INDEX BP: 47 ML/M2
ECHO LV EDV NDEX A2C: 49 ML/M2
ECHO LV EJECTION FRACTION A2C: 58 %
ECHO LV EJECTION FRACTION A4C: 61 %
ECHO LV EJECTION FRACTION BIPLANE: 59 % (ref 55–100)
ECHO LV ESV A2C: 34 ML
ECHO LV ESV A4C: 28 ML
ECHO LV ESV BP: 32 ML (ref 19–49)
ECHO LV ESV INDEX A2C: 20 ML/M2
ECHO LV ESV INDEX A4C: 17 ML/M2
ECHO LV ESV INDEX BP: 19 ML/M2
ECHO LV FRACTIONAL SHORTENING: 33 % (ref 28–44)
ECHO LV INTERNAL DIMENSION DIASTOLE INDEX: 2.93 CM/M2
ECHO LV INTERNAL DIMENSION DIASTOLIC: 4.9 CM (ref 3.9–5.3)
ECHO LV INTERNAL DIMENSION SYSTOLIC INDEX: 1.98 CM/M2
ECHO LV INTERNAL DIMENSION SYSTOLIC: 3.3 CM
ECHO LV IVSD: 0.9 CM (ref 0.6–0.9)
ECHO LV MASS 2D: 141.9 G (ref 67–162)
ECHO LV MASS INDEX 2D: 85 G/M2 (ref 43–95)
ECHO LV POSTERIOR WALL DIASTOLIC: 0.8 CM (ref 0.6–0.9)
ECHO LV RELATIVE WALL THICKNESS RATIO: 0.33
ECHO LVOT AREA: 3.5 CM2
ECHO LVOT AV VTI INDEX: 0.65
ECHO LVOT DIAM: 2.1 CM
ECHO LVOT MEAN GRADIENT: 3 MMHG
ECHO LVOT PEAK GRADIENT: 5 MMHG
ECHO LVOT PEAK VELOCITY: 1.1 M/S
ECHO LVOT STROKE VOLUME INDEX: 45 ML/M2
ECHO LVOT SV: 75.1 ML
ECHO LVOT VTI: 21.7 CM
ECHO MV AREA VTI: 2.9 CM2
ECHO MV E VELOCITY: 1.03 M/S
ECHO MV E/E' LATERAL: 17.17
ECHO MV LVOT VTI INDEX: 1.18
ECHO MV MAX VELOCITY: 1.2 M/S
ECHO MV MEAN GRADIENT: 2 MMHG
ECHO MV MEAN VELOCITY: 0.5 M/S
ECHO MV PEAK GRADIENT: 5 MMHG
ECHO MV VTI: 25.6 CM
ECHO RA AREA 4C: 28 CM2
ECHO RA END SYSTOLIC VOLUME APICAL 4 CHAMBER INDEX BSA: 66 ML/M2
ECHO RA VOLUME: 110 ML
ECHO RIGHT VENTRICULAR SYSTOLIC PRESSURE (RVSP): 51 MMHG
ECHO RV INTERNAL DIMENSION: 4.6 CM
ECHO RV TAPSE: 1.6 CM (ref 1.7–?)
ECHO TV REGURGITANT MAX VELOCITY: 3.46 M/S
ECHO TV REGURGITANT PEAK GRADIENT: 48 MMHG

## 2023-11-12 PROCEDURE — 93306 TTE W/DOPPLER COMPLETE: CPT | Performed by: INTERNAL MEDICINE

## 2023-11-13 ENCOUNTER — TELEPHONE (OUTPATIENT)
Age: 88
End: 2023-11-13

## 2023-11-13 NOTE — TELEPHONE ENCOUNTER
Verified patient with two types of identifiers. Notified of results and MD recommendations. Patient verbalized understanding and will call with any other questions.       Future Appointments   Date Time Provider [address]   1/12/2024  1:40 PM MD TRENT Mo BS AMB   11/12/2024  1:00 PM PACEMAKERTIP AMB   11/12/2024  1:20 PM JASON Flores - HAI RAMÍREZ AMB

## 2023-11-13 NOTE — TELEPHONE ENCOUNTER
----- Message from Juani Perkins MD sent at 11/12/2023  7:40 PM EST -----  LVEF remains normal   AF severe atrial enlargement  Mild AR, moderate MR, ID, TR, pulmonary hypertension     Continue follow up as scheduled  Future Appointments  1/12/2024  1:40 PM    Anastacia Sy MD    [facility]               BS AMB  11/12/2024 1:00 PM    PACEMAKER, STFRANCES       [facility]               BS AMB  11/12/2024 1:20 PM    JASON Cunningham NP [facility]               BS AMB

## 2023-11-30 ENCOUNTER — TELEPHONE (OUTPATIENT)
Age: 88
End: 2023-11-30

## 2023-11-30 NOTE — TELEPHONE ENCOUNTER
----- Message from Rea Dai RN sent at 11/30/2023  2:48 PM EST -----  Regarding: FW: Xena Palmer  Contact: [phone]    ----- Message -----  From: Xena Palmer  Sent: 11/30/2023   2:37 PM EST  To: Eder Terry [facility] Clinical Staff  Subject: Xena hebert for typo training levels are elevated and climbing.

## 2023-11-30 NOTE — TELEPHONE ENCOUNTER
Pt. Daughter following up on a [employer] message she sent to Dr. Allred.     Eliana phone - [phone]

## 2024-01-09 ENCOUNTER — TELEPHONE (OUTPATIENT)
Age: 89
End: 2024-01-09

## 2024-01-09 NOTE — TELEPHONE ENCOUNTER
Daughter cynthia called to say that the pt is in the [facility] in [place] and is being trans to rehab for 2 wks.    Pt request a call back #[phone]

## 2024-01-09 NOTE — TELEPHONE ENCOUNTER
Spoke with patient's daughter Eliana.  Rescheduled 6 month follow up for 2/2/24 post patients scheduled rehabilitation.    Future Appointments   Date Time Provider Department Center   2/2/2024  1:20 PM Clive Allred MD [facility] BS AMB   11/12/2024  1:00 PM PACEMAKERTIP BS AMB   11/12/2024  1:20 PM Nubia Garcia APRN - NP [facility] BS AMB

## 2024-02-01 NOTE — PROGRESS NOTES
Clive Allred MD      [id number],[facility],[place] [address]      Office [phone],Fax [phone]         Xena Palmer is a 85 y.o.  female is here for f/u visit .      Primary care physician:   Arthur Wong MD         Dear Arthur,      I had the pleasure of seeing Ms. Xena Palmer in the office today.      Assessment:     Permanent atrial fibrillation-status post AV node ablation 2021-s/p PPM   Chronic diastolic heart failure (HFpEF)   CAD - NSTEMI(7/15/19 )- PCI to RCA with JOHNNIE   PVCs   HTN   HLD   Hx of Hypothyroid/SLE/Non Hodgkin's Lymphoma in remission ( last treatment 2013)   History of lupus/rheumatoid arthritis   History of ERI   CKD    Anemia -   COPD on inhalers  PNA /CHF - [place] for 2 weeks - went to rehab and now living with daughter - Eliana      Plan:       Tolerating Eliquis - on 2.5mg bid  3/14/2023-, triglycerides 129, HDL 39, LDL 44  Pacemaker check 12/28//2023-functioning appropriately-3 events of NSVT.  Saw EP 11/2023.  Patient brought her medications and it was reviewed.  Currently on Lasix 40 mg half a tablet daily with an extra half tablet as needed.  Monitor blood pressure.   Aggressive cardiovascular risk factor modification.   Follow-up in 4months or earlier as needed.        Patient understands the plan. All questions were answered to the patient's satisfaction.      Medication Side Effects and Warnings were discussed with patient: yes   Patient Labs were reviewed and or requested:  yes   Patient Past Records were reviewed and or requested: yes      I appreciate the opportunity to be involved in . See note  below for details. Please do not hesitate to contact us with questions or concerns.      Clive Allred MD      Cardiac Testing/ Procedures:      A.Cardiac Cath/PCI: 10/2/20   Access: right radial artery, 5F.  Unable to engage left coronary from right radial.  Right CFA, 5F   Catheters:   Left

## 2024-02-02 ENCOUNTER — OFFICE VISIT (OUTPATIENT)
Age: 89
End: 2024-02-02
Payer: MEDICAID

## 2024-02-02 VITALS
BODY MASS INDEX: 25.95 KG/M2 | HEART RATE: 92 BPM | HEIGHT: 62 IN | SYSTOLIC BLOOD PRESSURE: 118 MMHG | OXYGEN SATURATION: 98 % | DIASTOLIC BLOOD PRESSURE: 60 MMHG | WEIGHT: 141 LBS

## 2024-02-02 DIAGNOSIS — I25.10 CORONARY ARTERY DISEASE INVOLVING NATIVE CORONARY ARTERY OF NATIVE HEART WITHOUT ANGINA PECTORIS: Primary | ICD-10-CM

## 2024-02-02 DIAGNOSIS — I47.29 NSVT (NONSUSTAINED VENTRICULAR TACHYCARDIA) (HCC): ICD-10-CM

## 2024-02-02 DIAGNOSIS — I48.11 LONGSTANDING PERSISTENT ATRIAL FIBRILLATION (HCC): ICD-10-CM

## 2024-02-02 DIAGNOSIS — Z98.890 S/P AV NODAL ABLATION: ICD-10-CM

## 2024-02-02 PROCEDURE — 99214 OFFICE O/P EST MOD 30 MIN: CPT | Performed by: INTERNAL MEDICINE

## 2024-02-02 PROCEDURE — 1123F ACP DISCUSS/DSCN MKR DOCD: CPT | Performed by: INTERNAL MEDICINE

## 2024-02-02 RX ORDER — LORATADINE 10 MG/1
CAPSULE, LIQUID FILLED ORAL
COMMUNITY

## 2024-02-02 RX ORDER — FERROUS SULFATE 220 (44)/5
ELIXIR ORAL
COMMUNITY
Start: 2024-01-16

## 2024-02-02 NOTE — PROGRESS NOTES
Chief Complaint   Patient presents with    Coronary Artery Disease    Atrial Fibrillation    Other     NSVT    Irregular Heart Beat     Ppm     Hypertension         Chest Pain  No        Last Lipids   Lab Results   Component Value Date    CHOL 91 07/16/2019    TRIG 95 07/16/2019    HDL 31 07/16/2019    LDLCALC 41 07/16/2019    CHOLHDLRATIO 2.9 07/16/2019        Refills    /60 (Site: Left Upper Arm, Position: Sitting)   Pulse 92   Ht 1.575 m (5' 2\")   Wt 64 kg (141 lb)   SpO2 98%   BMI 25.79 kg/m²       Have you been to the ER, urgent care clinic since your last visit? No  Hospitalized since your last visit? Yes, [address]  1/8/24- 1/17/24 Discharged  from rehab 1/28/24, [facility] rehab facility.    2. Have you seen or consulted with any other health care providers outside of the [facility] since your last visit?  No

## 2024-02-29 ENCOUNTER — TELEPHONE (OUTPATIENT)
Age: 89
End: 2024-02-29

## 2024-02-29 NOTE — TELEPHONE ENCOUNTER
Pt requested to speak with the nurse, calling about medication (lasix) recommended by pulmonologist. Pt stated she is on the medication but dosage was cut in half, now the doctor wants to increase back to 40mgs.       Pt# [phone]

## 2024-03-01 NOTE — TELEPHONE ENCOUNTER
Spoke with patient.  Stated pulmonology recommended an increase in Lasix to 40mg qday until swelling improves.  Was instructed to get your recommendation.      Patient confirms she has been taking 20mg daily and goes up to 40mg 1-2x/week only for weight gain/SOB.      Please advise.

## 2024-03-04 NOTE — TELEPHONE ENCOUNTER
Called patient and left VM.  Also sent [employer] message with provider recommendation from phone call encounter from 3/1/24.

## 2024-05-23 ENCOUNTER — OFFICE VISIT (OUTPATIENT)
Age: 89
End: 2024-05-23
Payer: MEDICAID

## 2024-05-23 VITALS
DIASTOLIC BLOOD PRESSURE: 60 MMHG | OXYGEN SATURATION: 99 % | HEIGHT: 62 IN | WEIGHT: 141 LBS | HEART RATE: 70 BPM | BODY MASS INDEX: 25.95 KG/M2 | SYSTOLIC BLOOD PRESSURE: 120 MMHG

## 2024-05-23 DIAGNOSIS — I50.32 CHRONIC DIASTOLIC HEART FAILURE (HCC): ICD-10-CM

## 2024-05-23 DIAGNOSIS — I47.29 NSVT (NONSUSTAINED VENTRICULAR TACHYCARDIA) (HCC): ICD-10-CM

## 2024-05-23 DIAGNOSIS — Z95.0 PRESENCE OF CARDIAC PACEMAKER: ICD-10-CM

## 2024-05-23 DIAGNOSIS — I48.11 LONGSTANDING PERSISTENT ATRIAL FIBRILLATION (HCC): ICD-10-CM

## 2024-05-23 DIAGNOSIS — I34.0 NONRHEUMATIC MITRAL VALVE REGURGITATION: ICD-10-CM

## 2024-05-23 DIAGNOSIS — I25.10 CORONARY ARTERY DISEASE INVOLVING NATIVE CORONARY ARTERY OF NATIVE HEART WITHOUT ANGINA PECTORIS: ICD-10-CM

## 2024-05-23 DIAGNOSIS — Z79.01 ANTICOAGULATED: ICD-10-CM

## 2024-05-23 DIAGNOSIS — Z98.890 S/P AV NODAL ABLATION: Primary | ICD-10-CM

## 2024-05-23 PROCEDURE — 1123F ACP DISCUSS/DSCN MKR DOCD: CPT | Performed by: INTERNAL MEDICINE

## 2024-05-23 PROCEDURE — 99214 OFFICE O/P EST MOD 30 MIN: CPT | Performed by: INTERNAL MEDICINE

## 2024-05-23 RX ORDER — CEPHALEXIN 500 MG/1
500 CAPSULE ORAL 4 TIMES DAILY
COMMUNITY

## 2024-05-23 RX ORDER — LANOLIN ALCOHOL/MO/W.PET/CERES
1000 CREAM (GRAM) TOPICAL DAILY
COMMUNITY

## 2024-05-23 RX ORDER — OXYCODONE HYDROCHLORIDE AND ACETAMINOPHEN 5; 325 MG/1; MG/1
1 TABLET ORAL EVERY 4 HOURS PRN
COMMUNITY

## 2024-05-23 NOTE — PROGRESS NOTES
Clive Allred MD      [id number],[facility],[place] [address]      Office [phone],Fax [phone]         Xena Palmer is a 85 y.o.  female is here for f/u visit .      Primary care physician:   Arthur Wong MD         Dear Arthur,      I had the pleasure of seeing Ms. Xena Palmer in the office today.      Assessment:     Permanent atrial fibrillation-status post AV node ablation 2021-s/p PPM   Chronic diastolic heart failure (HFpEF)   CAD - NSTEMI(7/15/19 )- PCI to RCA with JOHNNIE   PVCs   HTN   HLD   Hx of Hypothyroid/SLE/Non Hodgkin's Lymphoma in remission ( last treatment 2013)   History of lupus/rheumatoid arthritis   History of ERI   CKD    Anemia -   COPD on inhalers  PNA /CHF - [place] for 2 weeks - went to rehab and now living with daughter - Eliana      Plan:       Tolerating Eliquis - on 2.5mg bid  3/14/2023-, triglycerides 129, HDL 39, LDL 44  Pacemaker check 12/28//2023-functioning appropriately-3 events of NSVT.  Saw EP 11/2023.  Patient brought her medications and it was reviewed.  Currently on Lasix 40 mg half a tablet daily with an extra half tablet as needed.  Monitor blood pressure.   Aggressive cardiovascular risk factor modification.   Follow-up in 4months or earlier as needed.        Patient understands the plan. All questions were answered to the patient's satisfaction.      Medication Side Effects and Warnings were discussed with patient: yes   Patient Labs were reviewed and or requested:  yes   Patient Past Records were reviewed and or requested: yes      I appreciate the opportunity to be involved in . See note  below for details. Please do not hesitate to contact us with questions or concerns.      Clive Allred MD      Cardiac Testing/ Procedures:      A.Cardiac Cath/PCI: 10/2/20   Access: right radial artery, 5F.  Unable to engage left coronary from right radial.  Right CFA, 5F   Catheters:   Left 
  Chief Complaint   Patient presents with    Coronary Artery Disease    Atrial Fibrillation    Other     NSVT     Vitals:    05/23/24 1428   BP: 120/60   Site: Left Upper Arm   Position: Sitting   Cuff Size: Medium Adult   Pulse: 70   SpO2: 99%   Weight: 64 kg (141 lb)   Height: 1.575 m (5' 2\")      /60 (Site: Left Upper Arm, Position: Sitting, Cuff Size: Medium Adult)   Pulse 70   Ht 1.575 m (5' 2\")   Wt 64 kg (141 lb)   SpO2 99%   BMI 25.79 kg/m²          
Atrium: Left atrium is severely dilated. Left atrial volume index is severely increased (>48 mL/m2).    Right Atrium: Lead present in the right atrium. Right atrium is severely dilated.    Aortic Valve: Moderate sclerosis of the aortic valve cusp. Trace regurgitation. No stenosis.    Mitral Valve: Mildly thickened leaflet. Mild to moderate regurgitation with a centrally directed jet.    Tricuspid Valve: Moderate to severe regurgitation. Moderately elevated RVSP. The estimated RVSP is 51 mmHg.    Pulmonic Valve: Moderate regurgitation.    7//15/19 - EF 56-60%; Mild AR, TR, KS, MR. Mod Pulm HTN      C.StressNuclear/Stress ECHO/Stress test:      D.Vascular:      E. EP: 5/7/2021-DCCV-A. fib to sinus rhythm      F. Miscellaneous:       Subjective:   Xena Palmer is a 85 y.o.  female who returns for follow up  today .     No chest pain,  swelling lower extremities  Mild chronic dyspnea on exertion.  Weight has been stable.            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            Her daughter-Eliana is a patient here/former nurse at [facility] [employer]-accompanied by Eliana  Her son-in-law (Vincenzo Zimmer-was also a patient of mine) passed away on December 21, 2023 multiple medical problems/cirrhosis of the liver/liver cancer      ROS:   (bold if positive,  if negative)               Medications before admission:        Current Outpatient Medications   Medication Instructions    acetaminophen (TYLENOL) 325 MG tablet Oral, EVERY 4 HOURS PRN    albuterol (PROVENTIL) (5 MG/ML) 0.5% nebulizer solution Inhalation, PRN    albuterol sulfate HFA (PROVENTIL;VENTOLIN;PROAIR) 108 (90 Base) MCG/ACT inhaler 2 puffs, Inhalation, EVERY 6 HOURS PRN    apixaban (ELIQUIS) 2.5 mg, Oral, 2 TIMES DAILY    atorvastatin (LIPITOR) 20 mg, Oral, DAILY    Calcium Carb-Cholecalciferol 600-10 MG-MCG CAPS 1 tablet, Oral, DAILY    cephALEXin (KEFLEX) 500 mg, Oral, 4 TIMES

## 2024-05-30 ENCOUNTER — TELEPHONE (OUTPATIENT)
Age: 89
End: 2024-05-30

## 2024-05-30 NOTE — TELEPHONE ENCOUNTER
LVM with Pt. Legal Guardian. Cancelled 11/12 appt. For pacemaker and to see TEMITOPE Garcia. Appt. Needs to be Rescheduled.

## 2024-10-31 NOTE — PROGRESS NOTES
Cardiac Electrophysiology OFFICE Follow Up Note             Assessment/Plan:   1. Pacemaker  -     EKG 12 Lead  2. S/P AV dmitry ablation  -     EKG 12 Lead  3. Paroxysmal atrial fibrillation (HCC)  -     EKG 12 Lead  4. Coronary artery disease of native artery of native heart with stable angina pectoris (HCC)  5. HTN (hypertension), benign      Medtronic single chamber pacemaker   10.9 years on battery   99.5% RV pacing   No ventricular arrhythmias   - Continue Q3 month remote device transmissions. Follow-up in the EP clinic in one year, or sooner for any concerns.     Permanent atrial fibrillation   S/p AV node ablation in 2021   - Echo 11/2023: EF 59%, severe DAYANA, moderate MR, moderate/severe TR    Anticoagulation   - Continue Eliquis 2.5 mg BID. Low dose because of bruising.   - Denies of any bleeding issues. Know to contact clinic with any bleeding side effects (BRBPR, melena, hemotysis, hematuria).       Hypertension   BP controlled   - Continue lisinopril     CAD   NSTEMI July 2019, s/p JOHNNIE to RCA   - Continue atorvastatin             Subjective:         Xena Palmer is a 89 y.o. patient who is seen for follow up of her pacemaker and atrial fibrillation.     She is doing well and denies chest pain, dyspnea, dizziness or syncope. No bleeding issues on Eliquis.        Patient Active Problem List   Diagnosis    Lupus    HTN (hypertension), benign    NSTEMI (non-ST elevated myocardial infarction) (HCC)    Hypothyroid    Asthma    Coronary artery disease of native artery of native heart with stable angina pectoris (HCC)    Pacemaker    S/P AV dmitry ablation    Paroxysmal atrial fibrillation (HCC)         Current Outpatient Medications   Medication Sig Dispense Refill    cephALEXin (KEFLEX) 500 MG capsule Take 1 capsule by mouth 4 times daily      oxyCODONE-acetaminophen (PERCOCET) 5-325 MG per tablet Take 1 tablet by mouth every 4 hours as needed for Pain.      vitamin B-12 (CYANOCOBALAMIN) 1000 MCG

## 2024-11-18 ENCOUNTER — PROCEDURE VISIT (OUTPATIENT)
Age: 89
End: 2024-11-18

## 2024-11-18 ENCOUNTER — OFFICE VISIT (OUTPATIENT)
Age: 89
End: 2024-11-18
Payer: MEDICAID

## 2024-11-18 VITALS
DIASTOLIC BLOOD PRESSURE: 82 MMHG | OXYGEN SATURATION: 95 % | BODY MASS INDEX: 25.79 KG/M2 | HEIGHT: 62 IN | SYSTOLIC BLOOD PRESSURE: 130 MMHG | HEART RATE: 73 BPM

## 2024-11-18 DIAGNOSIS — I25.118 CORONARY ARTERY DISEASE OF NATIVE ARTERY OF NATIVE HEART WITH STABLE ANGINA PECTORIS (HCC): ICD-10-CM

## 2024-11-18 DIAGNOSIS — I10 HTN (HYPERTENSION), BENIGN: ICD-10-CM

## 2024-11-18 DIAGNOSIS — Z95.0 PACEMAKER: Primary | ICD-10-CM

## 2024-11-18 DIAGNOSIS — I48.0 PAROXYSMAL ATRIAL FIBRILLATION (HCC): ICD-10-CM

## 2024-11-18 DIAGNOSIS — Z98.890 S/P AV NODAL ABLATION: ICD-10-CM

## 2024-11-18 DIAGNOSIS — Z95.0 PRESENCE OF CARDIAC PACEMAKER: Primary | ICD-10-CM

## 2024-11-18 PROCEDURE — 93005 ELECTROCARDIOGRAM TRACING: CPT | Performed by: NURSE PRACTITIONER

## 2024-11-18 PROCEDURE — 99214 OFFICE O/P EST MOD 30 MIN: CPT | Performed by: NURSE PRACTITIONER

## 2024-11-18 NOTE — PROGRESS NOTES
Chief Complaint   Patient presents with    Cardiomyopathy     PAF     Vitals:    11/18/24 1328   BP: 130/82   Site: Left Upper Arm   Position: Sitting   Pulse: 73   SpO2: 95%   Height: 1.575 m (5' 2\")     Chest pain: DENIED     Recent hospital stays: DENIED     Refills: DENIED

## 2024-12-06 ENCOUNTER — OFFICE VISIT (OUTPATIENT)
Age: 89
End: 2024-12-06
Payer: MEDICAID

## 2024-12-06 VITALS
SYSTOLIC BLOOD PRESSURE: 150 MMHG | BODY MASS INDEX: 25.58 KG/M2 | OXYGEN SATURATION: 97 % | HEART RATE: 74 BPM | WEIGHT: 139 LBS | HEIGHT: 62 IN | DIASTOLIC BLOOD PRESSURE: 96 MMHG

## 2024-12-06 DIAGNOSIS — Z98.890 S/P AV NODAL ABLATION: Primary | ICD-10-CM

## 2024-12-06 DIAGNOSIS — Z95.0 PRESENCE OF CARDIAC PACEMAKER: ICD-10-CM

## 2024-12-06 DIAGNOSIS — I48.11 LONGSTANDING PERSISTENT ATRIAL FIBRILLATION (HCC): ICD-10-CM

## 2024-12-06 DIAGNOSIS — I50.32 CHRONIC DIASTOLIC HEART FAILURE (HCC): ICD-10-CM

## 2024-12-06 DIAGNOSIS — Z79.01 CHRONIC ANTICOAGULATION: ICD-10-CM

## 2024-12-06 PROCEDURE — 99214 OFFICE O/P EST MOD 30 MIN: CPT | Performed by: INTERNAL MEDICINE

## 2024-12-06 PROCEDURE — 1126F AMNT PAIN NOTED NONE PRSNT: CPT | Performed by: INTERNAL MEDICINE

## 2024-12-06 PROCEDURE — 1123F ACP DISCUSS/DSCN MKR DOCD: CPT | Performed by: INTERNAL MEDICINE

## 2024-12-06 RX ORDER — FORMOTEROL FUMARATE DIHYDRATE 20 UG/2ML
20 SOLUTION RESPIRATORY (INHALATION) EVERY 12 HOURS SCHEDULED
COMMUNITY
Start: 2024-05-20

## 2024-12-06 RX ORDER — REVEFENACIN 175 UG/3ML
175 SOLUTION RESPIRATORY (INHALATION) DAILY
COMMUNITY
Start: 2024-05-20

## 2024-12-06 RX ORDER — BUDESONIDE 0.5 MG/2ML
0.5 INHALANT ORAL DAILY
COMMUNITY
Start: 2024-05-20

## 2024-12-06 NOTE — PROGRESS NOTES
Clive Allred MD      [id number],[facility],[place] [address]      Office [phone],Fax [phone]         Xena Palmer is a 85 y.o.  female is here for f/u visit .      Primary care physician:   Arthur Wong MD         Dear Arthur,      I had the pleasure of seeing Ms. Xena Palmer in the office today.      Assessment:     Permanent atrial fibrillation-status post AV node ablation 2021-s/p PPM   Chronic diastolic heart failure (HFpEF)   CAD - NSTEMI(7/15/19 )- PCI to RCA with JOHNNIE   PVCs   HTN   HLD   Hx of Hypothyroid/SLE/Non Hodgkin's Lymphoma in remission ( last treatment 2013)   History of lupus/rheumatoid arthritis   History of ERI   CKD    Anemia -   COPD on inhalers On chronic prednisone     Plan:       Tolerating Eliquis - on 2.5mg bid  3/14/2023-, triglycerides 129, HDL 39, LDL 44  Pacemaker check- saw EP NP 11/18/24    Currently on Lasix 40 mg 1/2 tablet daily .  She used to be on beta-blocker because of bradycardia it was discontinued.  It was not restarted after her pacemaker placement.  Blood pressure elevated today in office.  Home BP well controlled. PCP office . If BP trends up can start Coreg 3.125 mg twice daily.  Uptitrate as tolerated..   Aggressive cardiovascular risk factor modification.   Follow-up in 6months or earlier as needed.        Patient understands the plan. All questions were answered to the patient's satisfaction.      Medication Side Effects and Warnings were discussed with patient: yes   Patient Labs were reviewed and or requested:  yes   Patient Past Records were reviewed and or requested: yes      I appreciate the opportunity to be involved in . See note  below for details. Please do not hesitate to contact us with questions or concerns.      Clive Allred MD      Cardiac Testing/ Procedures:      A.Cardiac Cath/PCI: 10/2/20   Access: right radial artery, 5F.  Unable to engage left

## 2024-12-06 NOTE — PROGRESS NOTES
had concerns including Atrial Fibrillation, Coronary Artery Disease, Hypertension, and Hyperlipidemia.    Vitals:    12/06/24 1421   BP: (!) 150/96   Site: Left Upper Arm   Position: Sitting   Pulse: 74   SpO2: 97%   Weight: 63 kg (139 lb)   Height: 1.575 m (5' 2\")        Chest pain No    Refills No        1. Have you been to the ER, urgent care clinic since your last visit? No       Hospitalized since your last visit? No       Where?        Date?

## 2025-01-29 NOTE — TELEPHONE ENCOUNTER
Patient is calling to see if we would be able to resend the medication Lisinopril 5mg + Furosemide 40mg. Patient states that pharmacy did not receive prescription. Please Advise.      [phone] <--- Click to Launch ICDx for PreOp, PostOp and Procedure

## 2025-02-20 NOTE — PROGRESS NOTES
Chief Complaint   Patient presents with   [facility] Follow Up     Denies chest pain/shortness of breath/dizziness. Occasional swelling. Visit Vitals  /66 (BP 1 Location: Right arm, BP Patient Position: Sitting)   Pulse 68   Resp 16   Ht 5' 2\" (1.575 m)   Wt 141 lb (64 kg)   SpO2 98%   BMI 25.79 kg/m²     Daughter with patient. HHN/PT seeing patient twice a week. No indicators present

## 2025-04-03 PROCEDURE — 93294 REM INTERROG EVL PM/LDLS PM: CPT | Performed by: INTERNAL MEDICINE

## 2025-06-12 ENCOUNTER — OFFICE VISIT (OUTPATIENT)
Age: 89
End: 2025-06-12
Payer: MEDICARE

## 2025-06-12 VITALS
RESPIRATION RATE: 16 BRPM | DIASTOLIC BLOOD PRESSURE: 82 MMHG | WEIGHT: 142 LBS | SYSTOLIC BLOOD PRESSURE: 140 MMHG | HEIGHT: 62 IN | BODY MASS INDEX: 26.13 KG/M2 | OXYGEN SATURATION: 96 % | HEART RATE: 76 BPM

## 2025-06-12 DIAGNOSIS — I10 HTN (HYPERTENSION), BENIGN: ICD-10-CM

## 2025-06-12 DIAGNOSIS — Z98.890 S/P AV NODAL ABLATION: ICD-10-CM

## 2025-06-12 DIAGNOSIS — Z79.01 CHRONIC ANTICOAGULATION: ICD-10-CM

## 2025-06-12 DIAGNOSIS — I50.32 CHRONIC DIASTOLIC HEART FAILURE (HCC): ICD-10-CM

## 2025-06-12 DIAGNOSIS — I48.11 LONGSTANDING PERSISTENT ATRIAL FIBRILLATION (HCC): ICD-10-CM

## 2025-06-12 DIAGNOSIS — Z95.0 PRESENCE OF CARDIAC PACEMAKER: ICD-10-CM

## 2025-06-12 DIAGNOSIS — I25.118 CORONARY ARTERY DISEASE OF NATIVE ARTERY OF NATIVE HEART WITH STABLE ANGINA PECTORIS: Primary | ICD-10-CM

## 2025-06-12 PROCEDURE — 1090F PRES/ABSN URINE INCON ASSESS: CPT | Performed by: INTERNAL MEDICINE

## 2025-06-12 PROCEDURE — 1036F TOBACCO NON-USER: CPT | Performed by: INTERNAL MEDICINE

## 2025-06-12 PROCEDURE — G2211 COMPLEX E/M VISIT ADD ON: HCPCS | Performed by: INTERNAL MEDICINE

## 2025-06-12 PROCEDURE — 1126F AMNT PAIN NOTED NONE PRSNT: CPT | Performed by: INTERNAL MEDICINE

## 2025-06-12 PROCEDURE — 99214 OFFICE O/P EST MOD 30 MIN: CPT | Performed by: INTERNAL MEDICINE

## 2025-06-12 PROCEDURE — G8427 DOCREV CUR MEDS BY ELIG CLIN: HCPCS | Performed by: INTERNAL MEDICINE

## 2025-06-12 PROCEDURE — 1159F MED LIST DOCD IN RCRD: CPT | Performed by: INTERNAL MEDICINE

## 2025-06-12 PROCEDURE — 1123F ACP DISCUSS/DSCN MKR DOCD: CPT | Performed by: INTERNAL MEDICINE

## 2025-06-12 PROCEDURE — G8419 CALC BMI OUT NRM PARAM NOF/U: HCPCS | Performed by: INTERNAL MEDICINE

## 2025-06-12 NOTE — PROGRESS NOTES
had concerns including Atrial Fibrillation, Hyperlipidemia, Hypertension, and Coronary Artery Disease.    Vitals:    06/12/25 1310   BP: (!) 140/82   BP Site: Left Upper Arm   Patient Position: Sitting   Pulse: 76   Resp: 16   SpO2: 96%   Weight: 64.4 kg (142 lb)   Height: 1.575 m (5' 2\")        Chest pain No    Refills No        1. Have you been to the ER, urgent care clinic since your last visit? No       Hospitalized since your last visit? No       Where?        Date?

## 2025-06-12 NOTE — PROGRESS NOTES
Clive Allred MD      [id number],[facility],[place] [address]      Office [phone],Fax [phone]         Xena Palmer is a 89 y.o.  female is here for f/u visit .      Primary care physician:   Arthur Wong MD         Dear Arthur,      I had the pleasure of seeing Ms. Xena Palmer in the office today.      Assessment:     Permanent atrial fibrillation-status post AV node ablation 2021-s/p PPM   Chronic diastolic heart failure (HFpEF)   CAD - NSTEMI(7/15/19 )- PCI to RCA with JOHNNIE   PVCs   HTN   HLD   Hx of Hypothyroid/SLE/Non Hodgkin's Lymphoma in remission ( last treatment 2013)   History of lupus/rheumatoid arthritis   History of ERI   CKD    Anemia -   COPD on inhalers On chronic prednisone     Plan:       Tolerating Eliquis - on 2.5mg bid  3/14/2023-, triglycerides 129, HDL 39, LDL 44  Pacemaker check- 4/4/25 - checked    Currently on Lasix 40 mg 1/2 tablet every other day - take extra half if needed.     She used to be on beta-blocker because of bradycardia it was discontinued.  It was not restarted after her pacemaker placement.  Blood pressure elevated today in office.  Home BP well controlled. PCP office . If BP trends up can start Coreg 3.125 mg twice daily.       Aggressive cardiovascular risk factor modification.    Needs lab work to be checked.  She has an appointment to see Dr. Wong.     Follow-up in 6months or earlier as needed.        Patient understands the plan. All questions were answered to the patient's satisfaction.      Medication Side Effects and Warnings were discussed with patient: yes   Patient Labs were reviewed and or requested:  yes   Patient Past Records were reviewed and or requested: yes      I appreciate the opportunity to be involved in . See note  below for details. Please do not hesitate to contact us with questions or concerns.      Clive Allred MD      Cardiac Testing/

## (undated) DEVICE — SUPPORT WRST AD W3.5XL9IN DIA14.5IN ART SFT ADJ HK AND LOOP

## (undated) DEVICE — Device

## (undated) DEVICE — GUIDE CATH CONVEY 5FR LT BU3.5 -- 39228-714

## (undated) DEVICE — TUBING PRSS MON L12IN PVC RIG NONEXPANDING M TO FEM CONN

## (undated) DEVICE — CABLE CATH L10FT RED PIN CONN 25-34 FOR NAVISTAR CARTO 3

## (undated) DEVICE — PERCLOSE PROGLIDE™ SUTURE-MEDIATED CLOSURE SYSTEM: Brand: PERCLOSE PROGLIDE™

## (undated) DEVICE — SUTURE ABSRB L30CM 2-0 VLT SPRL PDS + STRATAFIX SXPP1B410

## (undated) DEVICE — CATH NAVISTAR BIDIR FJ 8MM --

## (undated) DEVICE — MINI TREK CORONARY DILATATION CATHETER 2.0 MM X 12 MM / RAPID-EXCHANGE: Brand: MINI TREK

## (undated) DEVICE — DRESSING HEMOSTATIC SFT INTVENT W/O SLT DBL WRP QUIKCLOT LF

## (undated) DEVICE — GLIDESHEATH SLENDER STAINLESS STEEL KIT: Brand: GLIDESHEATH SLENDER

## (undated) DEVICE — ROSEN CURVED WIRE GUIDE: Brand: ROSEN

## (undated) DEVICE — CATHETER GUID TIG4 5 FRX100 CM SM ATRAUM SFT CONVEY

## (undated) DEVICE — DEVICE INFL 20ML 30ATM DGT FLD DISPNS SYR W ACCESSPLUS BLU

## (undated) DEVICE — SYR MED COLOR CODED 3ML RED -- MEDALLION

## (undated) DEVICE — HI-TORQUE BALANCE MIDDLEWEIGHT UNIVERSAL GUIDE WIRE .014 STRAIGHT TIP 3.0 CM X 190 CM: Brand: HI-TORQUE BALANCE MIDDLEWEIGHT UNIVERSAL

## (undated) DEVICE — PACK PROCEDURE SURG HRT CATH

## (undated) DEVICE — REM POLYHESIVE ADULT PATIENT RETURN ELECTRODE: Brand: VALLEYLAB

## (undated) DEVICE — TR BAND RADIAL ARTERY COMPRESSION DEVICE: Brand: TR BAND

## (undated) DEVICE — ANGIOGRAPHIC CATHETER: Brand: IMPULSE™

## (undated) DEVICE — GUIDE CATH CONVEY 5FR JL3.5 -- 39228-661

## (undated) DEVICE — SUTURE STRATAFIX SPRL MCRYL + SZ 3-0 L24IN ABSRB UD PS-2 SXMP1B108

## (undated) DEVICE — PROCEDURE KIT FLUID MGMT CUST MAINFOLD STRL

## (undated) DEVICE — VALVE ANGIO ID0.11IN HEMSTAT MTL GUID WIRE INSRT TOOL AND

## (undated) DEVICE — PINNACLE PRECISION ACCESS SYSTEM INTRODUCER SHEATH: Brand: PINNACLE PRECISION ACCESS SYSTEM

## (undated) DEVICE — 3M™ IOBAN™ 2 ANTIMICROBIAL INCISE DRAPE 6640EZ: Brand: IOBAN™ 2

## (undated) DEVICE — SUTURE ETHBND EXCEL SZ 2 L30IN NONABSORBABLE GRN L40MM V-37 MX69G

## (undated) DEVICE — LIMB HOLDER, WRIST/ANKLE: Brand: DEROYAL

## (undated) DEVICE — SLING ORTHOPEDIC PCH UNIV 19.5X9 IN 2-39 IN ARM W/ FOAM STRP

## (undated) DEVICE — Device: Brand: EAGLE EYE PLATINUM RX DIGITAL IVUS CATHETER

## (undated) DEVICE — HI-TORQUE VERSACORE MODIFIED J GUIDE WIRE SYSTEM 260 CM: Brand: HI-TORQUE VERSACORE

## (undated) DEVICE — SYRINGE IRRIG 60ML SFT PLIABLE BLB EZ TO GRP 1 HND USE W/

## (undated) DEVICE — PINNACLE INTRODUCER SHEATH: Brand: PINNACLE

## (undated) DEVICE — INTRODUCER SHTH 5 FRX30 CM 7 CM W/ NIT WIRE REG MICRO-STICK

## (undated) DEVICE — TUBING PRSS MON L6IN PVC M FEM CONN

## (undated) DEVICE — HEMO INTRO 8.5F 60CM SR0 --

## (undated) DEVICE — INTRO PEELWY HEMVLV 7F 13CM -- SHRT PRELUDE SNAP

## (undated) DEVICE — ZIP 8I SURGICAL SKIN CLOSURE DEVICE: Brand: ZIP 8I SURGICAL SKIN CLOSURE DEVICE

## (undated) DEVICE — DRESSING POSTOP AG PRISMASEAL 3.5X6IN

## (undated) DEVICE — RUNTHROUGH NS EXTRA FLOPPY PTCA GUIDEWIRE: Brand: RUNTHROUGH

## (undated) DEVICE — CATH GUID COR JR4.0S 6FR 100CM -- LAUNCHER

## (undated) DEVICE — SYRINGE MED 10ML RED POLYCARB BRL FIX M LUER CONN FLAT GRP

## (undated) DEVICE — GLIDESHEATH SLENDER ACCESS KIT: Brand: GLIDESHEATH SLENDER

## (undated) DEVICE — CATHETER GUID AL1 5 FRX100 CM SM ATRAUM SFT CONVEY

## (undated) DEVICE — BENTSON WIRE GUIDE 20CM DISTAL FLEXIBILITY WITH SOFTENED TIP: Brand: BENTSON

## (undated) DEVICE — RADIFOCUS OPTITORQUE ANGIOGRAPHIC CATHETER: Brand: OPTITORQUE

## (undated) DEVICE — PATCH CARTO 3 EXT REF --

## (undated) DEVICE — CATH GUID COR EB35 6FR 100CM -- LAUNCHER

## (undated) DEVICE — TUBING PRSS MON L60IN PVC RIG NONEXPANDING M TO FEM CONN

## (undated) DEVICE — MEDI-TRACE CADENCE ADULT, DEFIBRILLATION ELECTRODE -RTS  (10 PR/PK) - PHYSIO-CONTROL: Brand: MEDI-TRACE CADENCE

## (undated) DEVICE — CATHETER PTCA L138CM BLLN L12MM DIA2.75MM 0.025IN 18ATM

## (undated) DEVICE — HEART CATH-SFMC: Brand: MEDLINE INDUSTRIES, INC.

## (undated) DEVICE — GUIDE CATH CONVEY 5FR JR4 -- 39228-686